# Patient Record
Sex: FEMALE | Race: BLACK OR AFRICAN AMERICAN | NOT HISPANIC OR LATINO | Employment: OTHER | URBAN - METROPOLITAN AREA
[De-identification: names, ages, dates, MRNs, and addresses within clinical notes are randomized per-mention and may not be internally consistent; named-entity substitution may affect disease eponyms.]

---

## 2017-03-22 ENCOUNTER — OFFICE VISIT - HEALTHEAST (OUTPATIENT)
Dept: ADDICTION MEDICINE | Facility: CLINIC | Age: 52
End: 2017-03-22

## 2017-03-22 DIAGNOSIS — F11.20 OPIOID USE DISORDER, SEVERE, DEPENDENCE (H): ICD-10-CM

## 2017-04-03 ENCOUNTER — COMMUNICATION - HEALTHEAST (OUTPATIENT)
Dept: ADDICTION MEDICINE | Facility: CLINIC | Age: 52
End: 2017-04-03

## 2017-04-18 ENCOUNTER — OFFICE VISIT (OUTPATIENT)
Dept: NEUROLOGY | Facility: CLINIC | Age: 52
End: 2017-04-18

## 2017-04-18 VITALS
DIASTOLIC BLOOD PRESSURE: 89 MMHG | BODY MASS INDEX: 45.75 KG/M2 | RESPIRATION RATE: 20 BRPM | WEIGHT: 233 LBS | HEIGHT: 60 IN | SYSTOLIC BLOOD PRESSURE: 149 MMHG | HEART RATE: 82 BPM | OXYGEN SATURATION: 99 %

## 2017-04-18 DIAGNOSIS — G40.209 PARTIAL SYMPTOMATIC EPILEPSY WITH COMPLEX PARTIAL SEIZURES, NOT INTRACTABLE, WITHOUT STATUS EPILEPTICUS (H): ICD-10-CM

## 2017-04-18 RX ORDER — OXCARBAZEPINE 300 MG/1
TABLET, FILM COATED ORAL
Qty: 150 TABLET | Refills: 11 | Status: SHIPPED | OUTPATIENT
Start: 2017-04-18 | End: 2019-08-26

## 2017-04-18 ASSESSMENT — PAIN SCALES - GENERAL: PAINLEVEL: NO PAIN (0)

## 2017-04-18 NOTE — MR AVS SNAPSHOT
After Visit Summary   4/18/2017    Roger Fallon    MRN: [id number]           Patient Information     Date Of Birth          1965        Visit Information        Provider Department      4/18/2017 11:00 AM Roney Mora MD [facility]        Today's Diagnoses     Partial symptomatic epilepsy with complex partial seizures, not intractable, without status epilepticus (H)           Follow-ups after your visit        Follow-up notes from your care team     Return in about 6 months (around 10/18/2017).      Your next 10 appointments already scheduled     Apr 18, 2017 11:30 AM CDT   LAB with  LAB   [facility] ([facility])    [address] [place] [id number] [phone]           Patient must bring picture ID.  Patient should be prepared to give a urine specimen  Please do not eat 10-12 hours before your appointment if you are coming in fasting for labs on lipids, cholesterol, or glucose (sugar).  Pregnant women should follow their Care Team instructions. Water with medications is okay. Do not drink coffee or other fluids.   If you have concerns about taking  your medications, please ask at office or if scheduling via [employer], send a message by clicking on Secure Messaging, Message Your Care Team.            Apr 27, 2017  4:00 PM CDT   (Arrive by 3:45 PM)   MR BRAIN W CONTRAST with [address] [facility] ([facility])    [address] [place] [address] [phone]           Take your medicines as usual, unless your doctor tells you not to. Bring a list of your current medicines to your exam (including vitamins, minerals and over-the-counter drugs).  You will be given intravenous contrast for this exam. To prepare:   The day before your exam, drink extra fluids at least six 8-ounce glasses (unless your doctor tells you to restrict your fluids).   Have a blood test  (creatinine test) within 30 days of your exam. Go to your clinic or Diagnostic Imaging Department for this test.  The MRI machine uses a strong magnet. Please wear clothes without metal (snaps, zippers). A sweatsuit works well, or we may give you a hospital gown.  Please remove any body piercings and hair extensions before you arrive. You will also remove watches, jewelry, hairpins, wallets, dentures, partial dental plates and hearing aids. You may wear contact lenses, and you may be able to wear your rings. We have a safe place to keep your personal items, but it is safer to leave them at home.   **IMPORTANT** THE INSTRUCTIONS BELOW ARE ONLY FOR THOSE PATIENTS WHO HAVE BEEN TOLD THEY WILL RECEIVE SEDATION OR GENERAL ANESTHESIA DURING THEIR MRI PROCEDURE:  IF YOU WILL RECEIVE SEDATION (take medicine to help you relax during your exam):   You must get the medicine from your doctor before you arrive. Bring the medicine to the exam. Do not take it at home.   Arrive one hour early. Bring someone who can take you home after the test. Your medicine will make you sleepy. After the exam, you may not drive, take a bus or take a taxi by yourself.   No eating 8 hours before your exam. You may have clear liquids up until 4 hours before your exam. (Clear liquids include water, clear tea, black coffee and fruit juice without pulp.)  IF YOU WILL RECEIVE ANESTHESIA (be asleep for your exam):   Arrive 1 1/2 hours early. Bring someone who can take you home after the test. You may not drive, take a bus or take a taxi by yourself.   No eating 8 hours before your exam. You may have clear liquids up until 4 hours before your exam. (Clear liquids include water, clear tea, black coffee and fruit juice without pulp.)  Please call the Imaging Department at your exam site with any questions.            Oct 19, 2017 12:00 PM CDT   (Arrive by 11:45 AM)   Return Visit with Roney Mora MD   [facility] ([facility]  [place])    [address] [address] [phone]              Future tests that were ordered for you today     Open Future Orders        Priority Expected Expires Ordered    Oxcarbazepine (Trileptal) level Routine  2018    MRI Brain w contrast Routine  2018            Who to contact     Please call your clinic at [phone] to:    Ask questions about your health    Make or cancel appointments    Discuss your medicines    Learn about your test results    Speak to your doctor   If you have compliments or concerns about an experience at your clinic, or if you wish to file a complaint, please contact [facility] Patient Relations at [phone] or email us at [email or web address]         Additional Information About Your Visit        [employer] Information     [employer] is an electronic gateway that provides easy, online access to your medical records. With [employer], you can request a clinic appointment, read your test results, renew a prescription or communicate with your care team.     To sign up for [employer] visit the website at [email or web address]   You will be asked to enter the access code listed below, as well as some personal information. Please follow the directions to create your username and password.     Your access code is: [email or web address]  Expires: 7/3/2017  6:30 AM     Your access code will  in 90 days. If you need help or a new code, please contact your [facility] Clinic or call [phone] for assistance.        Care EveryWhere ID     This is your Care EveryWhere ID. This could be used by other organizations to access your [facility] medical records  [id number]        Your Vitals Were     Pulse Respirations Height Pulse Oximetry Breastfeeding? BMI (Body Mass Index)    82 20 1.524 m (5') 99% No 45.5 kg/m2       Blood Pressure from Last 3 Encounters:   17 149/89    10/19/16 115/74   05/16/16 130/78    Weight from Last 3 Encounters:   04/18/17 105.7 kg (233 lb)   10/19/16 108.1 kg (238 lb 4.8 oz)   05/07/13 108 kg (238 lb)                 Where to get your medicines      These medications were sent to [id number] - [place], [place] - [address] AT [facility] [address]  [address], [place] [address]    Hours:  24-hours Phone:  [phone]     OXcarbazepine 300 MG tablet          Primary Care Provider Office Phone # Fax #    Eric Lange [phone]       [facility] [address] [facility] [address]        Thank you!     Thank you for choosing [facility]  for your care. Our goal is always to provide you with excellent care. Hearing back from our patients is one way we can continue to improve our services. Please take a few minutes to complete the written survey that you may receive in the mail after your visit with us. Thank you!             Your Updated Medication List - Protect others around you: Learn how to safely use, store and throw away your medicines at www.disposemymeds.org.          This list is accurate as of: 4/18/17 11:19 AM.  Always use your most recent med list.                   Brand Name Dispense Instructions for use    escitalopram 20 MG tablet    LEXAPRO     Take 20 mg by mouth daily.       METFORMIN HCL PO      Take 500 mg by mouth 2 times daily (with meals)       * morphine 100 MG 12 hr tablet    MS CONTIN     Take 100 mg by mouth 2 times daily.       * morphine 60 MG 12 hr tablet    MS CONTIN     Take 2 tablets by mouth At Bedtime.       OXcarbazepine 300 MG tablet    TRILEPTAL    150 tablet    2 tab in AM & 3 tabs in evening       PERCOCET PO      Take 30 mg by mouth daily. 3 tabs once a day for breakthrough pain.       * Notice:  This list has 2 medication(s) that are the same as other medications prescribed for you. Read the directions carefully, and ask your doctor or other  care provider to review them with you.

## 2017-04-19 LAB — 10OH-CARBAZEPINE SERPL-MCNC: 12 UG/ML

## 2017-04-24 NOTE — PROGRESS NOTES
2017             Eric Lange MD   [place] [id number]      RE: Roger Fallon   MRN: [id number]   : 1965      Dear Dr. Lange:      This woman is 51 and is said to have a history of seizures, but has not had one for 3 years or more.  She says her father used to have them, but she cannot describe.  She said her seizures started at age 30 and have been characterized by speech arrest and profound sweating.  I have seen her a couple of times; the last time was in October of last year, and we noticed her MRI had a left subfrontal lesion, and so she is due for a repeat one.  This was not an enhancing lesion, and it was on the T2 FLAIR signal.  Apparently, the grandfather also had seizures curiously when he laughed.  She has no history of head injury, meningitis or encephalitis as a possible etiology for her seizures, which seem to be partial.  Her neurological exam has been normal.  She returns today and reports no episodes in the past.  She is using OxyContin and morphine for chronic back pain.  She is disabled because of that.        PHYSICAL EXAMINATION:  Small pupils as expected.  Fundi look normal.  Normal extraocular movements.  Facial motion and facial sensation are normal.  Neck is supple.  Good coordination.  Her arms are strong, and reflexes are equal.  She has absent ankle.  She has diabetes.      In summary, she seems to be seizure free.  The seizures have not been well characterized.  Her last EEG was done here in 2016 in May, and actually it was ordered, but she never followed through.  She will need admission for monitoring and classification of her episodes, which are not clear.  The MRI has shown a frontal lesion in the left in .  We will repeat the MRI with contrast and see if that is static or not.  I suspect it probably will be.      Her Trileptal level has been very low using 600-900 at 9.5.  We stressed compliance.  We  might raise the dose, but we will recheck the concentration today.  We will work on admitting her in the near future.      Sincerely,      MD ULYSSES Wick MD             D: 2017 11:51   T: 2017 07:11   MT: joyce      Name:     BETTY BOWEN   MRN:      [id number]        Account:      [id number]   :      1965           Service Date: 2017      Document: [id number]

## 2017-08-17 ENCOUNTER — RECORDS - HEALTHEAST (OUTPATIENT)
Dept: ADMINISTRATIVE | Facility: OTHER | Age: 52
End: 2017-08-17

## 2017-09-08 ENCOUNTER — RECORDS - HEALTHEAST (OUTPATIENT)
Dept: ADMINISTRATIVE | Facility: OTHER | Age: 52
End: 2017-09-08

## 2017-10-23 ENCOUNTER — RECORDS - HEALTHEAST (OUTPATIENT)
Dept: ADMINISTRATIVE | Facility: OTHER | Age: 52
End: 2017-10-23

## 2017-11-20 ENCOUNTER — RECORDS - HEALTHEAST (OUTPATIENT)
Dept: ADMINISTRATIVE | Facility: OTHER | Age: 52
End: 2017-11-20

## 2017-12-20 ENCOUNTER — RECORDS - HEALTHEAST (OUTPATIENT)
Dept: ADMINISTRATIVE | Facility: OTHER | Age: 52
End: 2017-12-20

## 2018-01-10 ENCOUNTER — RECORDS - HEALTHEAST (OUTPATIENT)
Dept: ADMINISTRATIVE | Facility: OTHER | Age: 53
End: 2018-01-10

## 2018-01-30 ENCOUNTER — RECORDS - HEALTHEAST (OUTPATIENT)
Dept: ADMINISTRATIVE | Facility: OTHER | Age: 53
End: 2018-01-30

## 2018-02-02 ENCOUNTER — RECORDS - HEALTHEAST (OUTPATIENT)
Dept: ADMINISTRATIVE | Facility: OTHER | Age: 53
End: 2018-02-02

## 2018-05-09 ENCOUNTER — RECORDS - HEALTHEAST (OUTPATIENT)
Dept: ADMINISTRATIVE | Facility: OTHER | Age: 53
End: 2018-05-09

## 2018-06-04 ENCOUNTER — RECORDS - HEALTHEAST (OUTPATIENT)
Dept: ADMINISTRATIVE | Facility: OTHER | Age: 53
End: 2018-06-04

## 2018-07-12 ENCOUNTER — RECORDS - HEALTHEAST (OUTPATIENT)
Dept: ADMINISTRATIVE | Facility: OTHER | Age: 53
End: 2018-07-12

## 2018-07-20 ENCOUNTER — RECORDS - HEALTHEAST (OUTPATIENT)
Dept: ADMINISTRATIVE | Facility: OTHER | Age: 53
End: 2018-07-20

## 2018-08-03 ENCOUNTER — RECORDS - HEALTHEAST (OUTPATIENT)
Dept: ADMINISTRATIVE | Facility: OTHER | Age: 53
End: 2018-08-03

## 2018-09-05 ENCOUNTER — RECORDS - HEALTHEAST (OUTPATIENT)
Dept: ADMINISTRATIVE | Facility: OTHER | Age: 53
End: 2018-09-05

## 2018-10-05 ENCOUNTER — RECORDS - HEALTHEAST (OUTPATIENT)
Dept: ADMINISTRATIVE | Facility: OTHER | Age: 53
End: 2018-10-05

## 2018-11-09 ENCOUNTER — RECORDS - HEALTHEAST (OUTPATIENT)
Dept: ADMINISTRATIVE | Facility: OTHER | Age: 53
End: 2018-11-09

## 2018-12-03 ENCOUNTER — RECORDS - HEALTHEAST (OUTPATIENT)
Dept: ADMINISTRATIVE | Facility: OTHER | Age: 53
End: 2018-12-03

## 2019-01-14 ENCOUNTER — RECORDS - HEALTHEAST (OUTPATIENT)
Dept: ADMINISTRATIVE | Facility: OTHER | Age: 54
End: 2019-01-14

## 2019-02-04 ENCOUNTER — RECORDS - HEALTHEAST (OUTPATIENT)
Dept: ADMINISTRATIVE | Facility: OTHER | Age: 54
End: 2019-02-04

## 2019-03-14 ENCOUNTER — RECORDS - HEALTHEAST (OUTPATIENT)
Dept: ADMINISTRATIVE | Facility: OTHER | Age: 54
End: 2019-03-14

## 2019-04-01 ENCOUNTER — RECORDS - HEALTHEAST (OUTPATIENT)
Dept: ADMINISTRATIVE | Facility: OTHER | Age: 54
End: 2019-04-01

## 2019-05-02 ENCOUNTER — RECORDS - HEALTHEAST (OUTPATIENT)
Dept: ADMINISTRATIVE | Facility: OTHER | Age: 54
End: 2019-05-02

## 2019-05-08 ENCOUNTER — RX ONLY (RX ONLY)
Age: 54
End: 2019-05-08

## 2019-05-08 RX ORDER — CLOBETASOL PROPIONATE 0.5 MG/G
A THIN LAYER OINTMENT TOPICAL BID
Qty: 1 | Refills: 0 | Status: ERX | COMMUNITY
Start: 2019-05-08

## 2019-05-14 ENCOUNTER — RX ONLY (RX ONLY)
Age: 54
End: 2019-05-14

## 2019-05-14 RX ORDER — BETAMETHASONE DIPROPIONATE 0.5 MG/G
A THIN LAYER CREAM TOPICAL BID
Qty: 1 | Refills: 0 | Status: ERX | COMMUNITY
Start: 2019-05-14

## 2019-06-14 ENCOUNTER — RECORDS - HEALTHEAST (OUTPATIENT)
Dept: ADMINISTRATIVE | Facility: OTHER | Age: 54
End: 2019-06-14

## 2019-06-17 ENCOUNTER — AMBULATORY - HEALTHEAST (OUTPATIENT)
Dept: FAMILY MEDICINE | Facility: CLINIC | Age: 54
End: 2019-06-17

## 2019-06-17 ENCOUNTER — OFFICE VISIT - HEALTHEAST (OUTPATIENT)
Dept: FAMILY MEDICINE | Facility: CLINIC | Age: 54
End: 2019-06-17

## 2019-06-17 DIAGNOSIS — F19.10 SUBSTANCE ABUSE (H): ICD-10-CM

## 2019-06-17 DIAGNOSIS — G89.29 OTHER CHRONIC PAIN: ICD-10-CM

## 2019-06-17 ASSESSMENT — MIFFLIN-ST. JEOR: SCORE: 1572.92

## 2019-07-07 ENCOUNTER — COMMUNICATION - HEALTHEAST (OUTPATIENT)
Dept: FAMILY MEDICINE | Facility: CLINIC | Age: 54
End: 2019-07-07

## 2019-07-16 ENCOUNTER — DOCUMENTATION ONLY (OUTPATIENT)
Dept: CARE COORDINATION | Facility: CLINIC | Age: 54
End: 2019-07-16

## 2019-07-31 ENCOUNTER — COMMUNICATION - HEALTHEAST (OUTPATIENT)
Dept: FAMILY MEDICINE | Facility: CLINIC | Age: 54
End: 2019-07-31

## 2019-07-31 ENCOUNTER — OFFICE VISIT - HEALTHEAST (OUTPATIENT)
Dept: FAMILY MEDICINE | Facility: CLINIC | Age: 54
End: 2019-07-31

## 2019-07-31 DIAGNOSIS — J45.20 MILD INTERMITTENT REACTIVE AIRWAY DISEASE WITHOUT COMPLICATION: ICD-10-CM

## 2019-07-31 DIAGNOSIS — Z12.11 SCREEN FOR COLON CANCER: ICD-10-CM

## 2019-07-31 DIAGNOSIS — F32.9 MAJOR DEPRESSIVE DISORDER WITH SINGLE EPISODE, REMISSION STATUS UNSPECIFIED: ICD-10-CM

## 2019-07-31 DIAGNOSIS — M51.369 DEGENERATION OF INTERVERTEBRAL DISC OF LUMBAR REGION: ICD-10-CM

## 2019-07-31 DIAGNOSIS — B37.2 CANDIDAL INTERTRIGO: ICD-10-CM

## 2019-08-26 ENCOUNTER — OFFICE VISIT (OUTPATIENT)
Dept: NEUROLOGY | Facility: CLINIC | Age: 54
End: 2019-08-26
Payer: MEDICARE

## 2019-08-26 VITALS
RESPIRATION RATE: 16 BRPM | HEIGHT: 60 IN | SYSTOLIC BLOOD PRESSURE: 141 MMHG | HEART RATE: 96 BPM | WEIGHT: 235 LBS | BODY MASS INDEX: 46.13 KG/M2 | DIASTOLIC BLOOD PRESSURE: 80 MMHG | OXYGEN SATURATION: 97 %

## 2019-08-26 DIAGNOSIS — G40.209 PARTIAL SYMPTOMATIC EPILEPSY WITH COMPLEX PARTIAL SEIZURES, NOT INTRACTABLE, WITHOUT STATUS EPILEPTICUS (H): Primary | ICD-10-CM

## 2019-08-26 DIAGNOSIS — G40.209 PARTIAL SYMPTOMATIC EPILEPSY WITH COMPLEX PARTIAL SEIZURES, NOT INTRACTABLE, WITHOUT STATUS EPILEPTICUS (H): ICD-10-CM

## 2019-08-26 RX ORDER — OXCARBAZEPINE 300 MG/1
TABLET, FILM COATED ORAL
Qty: 150 TABLET | Refills: 11 | Status: SHIPPED | OUTPATIENT
Start: 2019-08-26 | End: 2019-08-26

## 2019-08-26 ASSESSMENT — PAIN SCALES - GENERAL: PAINLEVEL: NO PAIN (0)

## 2019-08-26 ASSESSMENT — MIFFLIN-ST. JEOR: SCORE: 1587.45

## 2019-08-26 NOTE — LETTER
RE: Roger Fallon  [address] [place] [address]       Service Date: 2019      Eric Lange MD   [address] [address]      RE: Roger Fallon   MRN: [id number]   : 1965      Dear Dr. Lange:      We saw Ms. Fallon, a 54-year-old  with a history of seizures.  I had last seen her in 2017.  The etiology is not clear.  She reported that her seizures had started at the age of 30, and they are characterized by speech arrest and sweating.  Her grandfather had seizures later in life, and they were similar.  MRI of a left subfrontal lesion was noted, but in retrospect, I do not see it.      She has not had any seizures since seen in 2017.  She has been kept on Trileptal with good results at 600-900.  Seizures have not been very well characterized in the past.  An admission for monitoring was suggested previously, but she is not interested in it.  The patient says that since her last visit, she has had no further seizures or auras.      She had her last MRI in , and she had had a pituitary adenoma found at the age of 19.  It ended up she had had an MRI before on the pituitary gland.  She currently does not have any pituitary signs or symptoms.  She does have an intensive feeling in the sella turcica where the septations suggest a cyst, and there is thin homogenously enhancing pituitary lesion on the floor of the sella turcica resulting in a partially empty sella appearance.  No mass is demonstrated within the sella.  We will reexamine this.      PHYSICAL EXAMINATION:  Her vital signs are good, except blood pressure is 140/80.  Weight is 235.  She has no visual field defect to confrontation.  Eye movements are good.  There is no facial asymmetry.  Coordination is good.  Reflexes are equal and symmetrical.  Gait, station and plantars are normal.      In summary, she continues seizure free.  The etiology of her epilepsy  eludes us.  She has not had a full workup.  Her grandfather did have seizures later in life.  There is a question of a pituitary lesion, and I will redo the MRI with pituitary protocol.  We will see her and check her concentration and renew her Trileptal.      Sincerely,      Roney Mora MD

## 2019-08-26 NOTE — NURSING NOTE
Chief Complaint   Patient presents with     INTRACTABLE MIGRAINES     [facility] RETURN     Lita Ventura, CMA

## 2019-08-27 LAB — 10OH-CARBAZEPINE SERPL-MCNC: 9.4 UG/ML (ref 10–35)

## 2019-08-27 NOTE — PROGRESS NOTES
Service Date: 2019      [place] [address]      RE: Roger Fallon   MRN: [id number]   : 1965      Dear Dr. Lange:      We saw Ms. Fallon, a 54-year-old  with a history of seizures.  I had last seen her in 2017.  The etiology is not clear.  She reported that her seizures had started at the age of 30, and they are characterized by speech arrest and sweating.  Her grandfather had seizures later in life, and they were similar.  MRI of a left subfrontal lesion was noted, but in retrospect, I do not see it.      She has not had any seizures since seen in 2017.  She has been kept on Trileptal with good results at 600-900.  Seizures have not been very well characterized in the past.  An admission for monitoring was suggested previously, but she is not interested in it.  The patient says that since her last visit, she has had no further seizures or auras.      She had her last MRI in , and she had had a pituitary adenoma found at the age of 19.  It ended up she had had an MRI before on the pituitary gland.  She currently does not have any pituitary signs or symptoms.  She does have an intensive feeling in the sella turcica where the septations suggest a cyst, and there is thin homogenously enhancing pituitary lesion on the floor of the sella turcica resulting in a partially empty sella appearance.  No mass is demonstrated within the sella.  We will reexamine this.      PHYSICAL EXAMINATION:  Her vital signs are good, except blood pressure is 140/80.  Weight is 235.  She has no visual field defect to confrontation.  Eye movements are good.  There is no facial asymmetry.  Coordination is good.  Reflexes are equal and symmetrical.  Gait, station and plantars are normal.      In summary, she continues seizure free.  The etiology of her epilepsy eludes us.  She has not had a full workup.  Her grandfather did have  seizures later in life.  There is a question of a pituitary lesion, and I will redo the MRI with pituitary protocol.  We will see her and check her concentration and renew her Trileptal.      Sincerely,      MD ULYSSES Wick MD             D: 2019   T: 2019   MT: joyce      Name:     BETTY BOWEN   MRN:      [id number]        Account:      [id number]   :      1965           Service Date: 2019      Document: [id number]

## 2019-08-28 RX ORDER — OXCARBAZEPINE 300 MG/1
TABLET, FILM COATED ORAL
Qty: 450 TABLET | Refills: 11 | Status: SHIPPED | OUTPATIENT
Start: 2019-08-28 | End: 2020-08-24

## 2019-09-29 ENCOUNTER — ANCILLARY PROCEDURE (OUTPATIENT)
Dept: MRI IMAGING | Facility: CLINIC | Age: 54
End: 2019-09-29
Attending: PSYCHIATRY & NEUROLOGY
Payer: MEDICARE

## 2019-09-29 DIAGNOSIS — G40.209 PARTIAL SYMPTOMATIC EPILEPSY WITH COMPLEX PARTIAL SEIZURES, NOT INTRACTABLE, WITHOUT STATUS EPILEPTICUS (H): ICD-10-CM

## 2019-09-29 RX ORDER — GADOBUTROL 604.72 MG/ML
10 INJECTION INTRAVENOUS ONCE
Status: COMPLETED | OUTPATIENT
Start: 2019-09-29 | End: 2019-09-29

## 2019-09-29 RX ADMIN — GADOBUTROL 10 ML: 604.72 INJECTION INTRAVENOUS at 11:50

## 2019-09-29 NOTE — DISCHARGE INSTRUCTIONS
MRI Contrast Discharge Instructions    The IV contrast you received today will pass out of your body in your  urine. This will happen in the next 24 hours. You will not feel this process.  Your urine will not change color.    Drink at least 4 extra glasses of water or juice today (unless your doctor  has restricted your fluids). This reduces the stress on your kidneys.  You may take your regular medicines.    If you are on dialysis: It is best to have dialysis today.    If you have a reaction: Most reactions happen right away. If you have  any new symptoms after leaving the hospital (such as hives or swelling),  call your hospital at the correct number below. Or call your family doctor.  If you have breathing distress or wheezing, call 911.    Special instructions: ***    I have read and understand the above information.    Signature:______________________________________ Date:___________    Staff:__________________________________________ Date:___________     Time:__________    [facility] Departments:    ___[place]: [phone]  ___[facility]: [phone]  ___[place]: [phone] ___[facility]: [phone]  ___[facility]: [phone]  ___[facility]: [phone]  ___[phone]  ___[facility]: [phone]  ___[phone]

## 2020-06-25 ENCOUNTER — RECORDS - HEALTHEAST (OUTPATIENT)
Dept: ADMINISTRATIVE | Facility: OTHER | Age: 55
End: 2020-06-25

## 2020-06-25 ENCOUNTER — AMBULATORY - HEALTHEAST (OUTPATIENT)
Dept: SURGERY | Facility: CLINIC | Age: 55
End: 2020-06-25

## 2020-06-25 DIAGNOSIS — Z11.59 ENCOUNTER FOR SCREENING FOR OTHER VIRAL DISEASES: ICD-10-CM

## 2020-08-19 ENCOUNTER — RECORDS - HEALTHEAST (OUTPATIENT)
Dept: ADMINISTRATIVE | Facility: OTHER | Age: 55
End: 2020-08-19

## 2020-08-24 ENCOUNTER — VIRTUAL VISIT (OUTPATIENT)
Dept: NEUROLOGY | Facility: CLINIC | Age: 55
End: 2020-08-24
Payer: MEDICARE

## 2020-08-24 ENCOUNTER — TELEPHONE (OUTPATIENT)
Dept: NEUROLOGY | Facility: CLINIC | Age: 55
End: 2020-08-24

## 2020-08-24 DIAGNOSIS — G40.209 PARTIAL SYMPTOMATIC EPILEPSY WITH COMPLEX PARTIAL SEIZURES, NOT INTRACTABLE, WITHOUT STATUS EPILEPTICUS (H): Primary | ICD-10-CM

## 2020-08-24 DIAGNOSIS — G40.209 PARTIAL SYMPTOMATIC EPILEPSY WITH COMPLEX PARTIAL SEIZURES, NOT INTRACTABLE, WITHOUT STATUS EPILEPTICUS (H): ICD-10-CM

## 2020-08-24 RX ORDER — OXCARBAZEPINE 300 MG/1
TABLET, FILM COATED ORAL
Qty: 450 TABLET | Refills: 11 | Status: SHIPPED | OUTPATIENT
Start: 2020-08-24 | End: 2023-02-06

## 2020-08-24 NOTE — PROGRESS NOTES
"Roger Fallon is a 55 year old female who is being evaluated via a billable video visit.      The patient has been notified of following:     \"This video visit will be conducted via a call between you and your physician/provider. We have found that certain health care needs can be provided without the need for an in-person physical exam.  This service lets us provide the care you need with a video conversation.  If a prescription is necessary we can send it directly to your pharmacy.  If lab work is needed we can place an order for that and you can then stop by our lab to have the test done at a later time.    Video visits are billed at different rates depending on your insurance coverage.  Please reach out to your insurance provider with any questions.    If during the course of the call the physician/provider feels a video visit is not appropriate, you will not be charged for this service.\"    Patient has given verbal consent for Video visit? Yes  How would you like to obtain your AVS? Mail a copy  Patient would like sent via: Text to cell phone: [phone]  Will anyone else be joining your video visit? No      Video-Visit Details    Type of service:  Video Visit x 30 min      Distant Location (provider location):  [facility]UROLOG[facility]       [facility]        "

## 2020-08-24 NOTE — PROGRESS NOTES
Service Date: 2020      Eric Lange MD   [address] [address]      RE: Roger Fallon    MRN: [id number]   : 1965      Dear Dr. Lange:       Ms. Roger Fallon is a 55 year old whom I have treated for a suspected seizure disorder, and she came in today for followup.  It was a virtual video visit over a period of about 30 minutes.  She is a most pleasant patient.  I reviewed her history with her.  I have not seen her for a seizure or other neurological problem for over 3 years since 2017.  We gave her a tentative diagnosis of partial symptomatic epilepsy.  She had not had any seizures at that time for 3 or more years.  She was not able to describe her seizure, but she said this started at the age of 30, and they were characterized by speech arrest and profound sweating.  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  She has no history of meningitis, encephalitis or head injury in her family.  I believe there is a family history in her father having seizures.  My neuro exam in the past when I could see her, there were no deficits.  The last EEG was ordered in 2016, but never accomplished.  The episodes were not clear seizures, but we kept her on the Trileptal that she has been on since that time, and this is oxcarbazepine in 2 tablets in the morning or 600 mg and 3 tablets in the evening or 900 mg.  I do not have a current concentration of carbamazepine, but there was one 6 months ago, and that was low at 9.4.      On the one prior to that in  when I did see her, it was similar at 12.      She did not follow up an EEG ordered in 2016.  There has been a lot of uncertainty about a seizure disorder, but we kept her on the medication.  Today she reports she has not had any seizures since I last saw her in 2017.      PHYSICAL EXAMINATION:  Her blood pressure is 141/80.  She looks very  well.  A grandchild is with her; she is babysitting.  Her mental status exam is normal.  Cranial nerves look grossly normal.  Coordination is very good in both arms and legs.  She reports no difficulties.      In summary, this patient is going to have surgery for the knee on , and we will be assuming she has a seizure disorder, although we do not have a clear precise diagnosis, and admission has not been possible.  We will do a oxcarbazepine level to see if it is near therapeutic, but they have been low in the past.  We will certify her for surgery.  We told her to take her Trileptal with small sips of water the night before the operation.  She has no history of cerebrovascular disease or a problem of that nature.      Her past medical history does not include any cardiac disease.  She will be having a total knee arthroplasty, as she says she cannot walk because of the pain.      We shall see what her blood level is.  It is hopefully increased to prepare her for surgery on .      Sincerely,      MD ULYSSES Wick MD             D: 2020   T: 2020   MT: joyce      Name:     BETTY BOWEN   MRN:      [id number]        Account:      [id number]   :      1965           Service Date: 2020      Document: [id number]

## 2020-08-24 NOTE — LETTER
"2020     RE: Roger Fallon  [address] [place] [address]     Dear Colleague,    Thank you for referring your patient, Roger Fallon, to the [facility] NEUROLOGY at [facility]. Please see a copy of my visit note below.    Roger Fallon is a 55 year old female who is being evaluated via a billable video visit.      The patient has been notified of following:     \"This video visit will be conducted via a call between you and your physician/provider. We have found that certain health care needs can be provided without the need for an in-person physical exam.  This service lets us provide the care you need with a video conversation.  If a prescription is necessary we can send it directly to your pharmacy.  If lab work is needed we can place an order for that and you can then stop by our lab to have the test done at a later time.    Video visits are billed at different rates depending on your insurance coverage.  Please reach out to your insurance provider with any questions.    If during the course of the call the physician/provider feels a video visit is not appropriate, you will not be charged for this service.\"    Patient has given verbal consent for Video visit? Yes  How would you like to obtain your AVS? Mail a copy  Patient would like sent via: Text to cell phone: [phone]  Will anyone else be joining your video visit? No    Video-Visit Details  Type of service:  Video Visit x 30 min  Distant Location (provider location):  [facility] NEUROLOGY   fi      Service Date: 2020        Eric Lange MD   [address] [address]      RE: Roger Fallon    MRN: [id number]   : 1965      Dear Dr. Lange:       Ms. Roger Fallon is a 55 year old whom I have treated for a suspected seizure disorder, and she came in today for followup.  It was a virtual video visit over a period of about 30 " minutes.  She is a most pleasant patient.  I reviewed her history with her.  I have not seen her for a seizure or other neurological problem for over 3 years since 03/2017.  We gave her a tentative diagnosis of partial symptomatic epilepsy.  She had not had any seizures at that time for 3 or more years.  She was not able to describe her seizure, but she said this started at the age of 30, and they were characterized by speech arrest and profound sweating.  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  She has no history of meningitis, encephalitis or head injury in her family.  I believe there is a family history in her father having seizures.  My neuro exam in the past when I could see her, there were no deficits.  The last EEG was ordered in 2016, but never accomplished.  The episodes were not clear seizures, but we kept her on the Trileptal that she has been on since that time, and this is oxcarbazepine in 2 tablets in the morning or 600 mg and 3 tablets in the evening or 900 mg.  I do not have a current concentration of carbamazepine, but there was one 6 months ago, and that was low at 9.4.      On the one prior to that in 2017 when I did see her, it was similar at 12.      She did not follow up an EEG ordered in 2016.  There has been a lot of uncertainty about a seizure disorder, but we kept her on the medication.  Today she reports she has not had any seizures since I last saw her in 2017.      PHYSICAL EXAMINATION:  Her blood pressure is 141/80.  She looks very well.  A grandchild is with her; she is babysitting.  Her mental status exam is normal.  Cranial nerves look grossly normal.  Coordination is very good in both arms and legs.  She reports no difficulties.      In summary, this patient is going to have surgery for the knee on 09/08, and we will be assuming she has a seizure disorder, although we do not have a clear precise diagnosis, and  admission has not been possible.  We will do a oxcarbazepine level to see if it is near therapeutic, but they have been low in the past.  We will certify her for surgery.  We told her to take her Trileptal with small sips of water the night before the operation.  She has no history of cerebrovascular disease or a problem of that nature.      Her past medical history does not include any cardiac disease.  She will be having a total knee arthroplasty, as she says she cannot walk because of the pain.      We shall see what her blood level is.  It is hopefully increased to prepare her for surgery on 09/08.      Sincerely,      Roney Mora MD

## 2020-08-27 ENCOUNTER — RECORDS - HEALTHEAST (OUTPATIENT)
Dept: ADMINISTRATIVE | Facility: OTHER | Age: 55
End: 2020-08-27

## 2020-08-27 ASSESSMENT — MIFFLIN-ST. JEOR: SCORE: 1618.28

## 2020-09-08 ENCOUNTER — SURGERY - HEALTHEAST (OUTPATIENT)
Dept: SURGERY | Facility: CLINIC | Age: 55
End: 2020-09-08
Payer: MEDICARE

## 2021-05-28 ENCOUNTER — RECORDS - HEALTHEAST (OUTPATIENT)
Dept: ADMINISTRATIVE | Facility: CLINIC | Age: 56
End: 2021-05-28

## 2021-05-29 ENCOUNTER — RECORDS - HEALTHEAST (OUTPATIENT)
Dept: ADMINISTRATIVE | Facility: CLINIC | Age: 56
End: 2021-05-29

## 2021-05-29 NOTE — PROGRESS NOTES
Subjective:      Roger Fallon is a 54 y.o. female who presents for evaluation of tablets care and chronic pain.  She had been seeing Dr. Lange at [facility].  Dr. Lange no longer works there.  She says she was told to come here to continue her methadone prescription.  Narcotics report was run on patient today.  It appears she is getting methadone prescriptions about every 2 weeks from Dr. Lange since July 2018.  She says she has been out of all of her medicines for 3 weeks.  Her pain is significantly bothersome.  Pain is mostly in the bilateral knees and her back.  History of right knee surgery x2.    She thinks she is probably due for a Pap smear.  She is willing to get a colonoscopy.  Has had a mammogram within the past year or 2.  Her mom passed away from breast cancer.  She states she is taking gabapentin for seizure disorder.  She is following with an epileptic specialist.  I requested through care everywhere.  I reviewed old records and I will update EHR.  I also requested paper records (unavailable through [facility] Everywhere) from [facility].  I will review those records and update chart when they arrive.  She reports past history of substance abuse, does not elaborate today.    Patient Active Problem List   Diagnosis     Chronic pain     Complex partial epilepsy (H)     Degeneration of intervertebral disc of lumbar region     Hypercholesterolemia     Insomnia     Major depressive disorder, single episode     Migraine     Pituitary adenoma (H)     Reactive airway disease       Current Outpatient Medications:      albuterol (PROAIR HFA;PROVENTIL HFA;VENTOLIN HFA) 90 mcg/actuation inhaler, Inhale 2 puffs every 4 (four) hours as needed for wheezing., Disp: 1 Inhaler, Rfl: 0     benzonatate (TESSALON) 100 MG capsule, Take 1 capsule (100 mg total) by mouth every 8 (eight) hours., Disp: 21 capsule, Rfl: 0     gabapentin (NEURONTIN) 300 MG capsule, Take 1 capsule (300 mg total) by mouth 3 (three)  times a day., Disp: 15 capsule, Rfl: 0     methadone (DOLOPHINE) 10 MG tablet, Take 1 tablet by mouth 4 times a day with food, Disp: , Rfl:      MOVANTIK 25 mg Tab tablet, TK 1 T PO 1 HOUR BEFORE OR 2 H AFTER A MEAL, Disp: , Rfl: 2     NARCAN 4 mg/actuation nasal spray, INHALE 0.1 ML SPRAY INTO ONE NOSTRIL PRF OPIOID OVERDOSE, Disp: , Rfl: 0     OXcarbazepine (TRILEPTAL) 300 MG tablet, , Disp: , Rfl: 2     polyethylene glycol (GLYCOLAX) 17 gram/dose powder, DISSOLVE 17 GRAMS IN 8 OUNCES OF LIQUID AND DRINK PO QD, Disp: , Rfl: 2     PREPARATION H 0.25-14-74.9 % Oint, APPLY TOPICALLY AA QID PRN, Disp: , Rfl: 0     triamcinolone (KENALOG) 0.1 % cream, APPLY 1 APPLICATION TO RASH ON LEG TWICE-THREE TIMES D, Disp: , Rfl: 2     Objective:     Allergies   Allergen Reactions     Sumatriptan Anaphylaxis and Hives     Cucumber Swelling     Tongue       Penicillins Hives     Pumpkin Seed Swelling     Tongue swells     Vitals:    06/17/19 1114   BP: 136/72   Pulse: 88   Resp: 16     Body mass index is 45.7 kg/m .    Vitals reviewed as above.  General: Patient is alert and oriented x 3, in no apparent distress, appropriately groomed with normal affect  Cardiac: Regular rate and rhythm, no murmurs  Pulmonary: lungs clear to ausculation, no crackles, rales, rhonchi, or wheezing  Musculoskeletal: normal 4/5 strength in major muscle groups in lower extremities bilaterally, patient walks in a stiff gait, limping slightly, has a cane       Assessment and Plan:     1.  Establish care.  Records will be updated, as stated in HPI.  She wants to get up-to-date on all of her healthcare maintenance.  We will contact her for follow-up appointments and referrals.    2.  Chronic pain with history of substance abuse.  She had been prescribed methadone every 2 weeks by previous provider at [facility].  I reviewed with her that I, and all of the providers at our clinic, are unable to prescribe methadone.  She would need to see a provider at a  "methadone clinic.  We are unable to give referrals to those clinics.  I reviewed information on the Internet and provided her with local methadone clinics.  She was given phone numbers and addresses.  She will contact them to establish care.  I reviewed with her we do have some providers at other locations that prescribe Suboxone.  She says \"Suboxone does not work for me,\" and is not interested in that referral.    This dictation uses voice recognition software, which may contain typographical errors.  "

## 2021-05-30 NOTE — TELEPHONE ENCOUNTER
Does she have a neurologist she is seeing regularly for her seizure disorder?  If not, or if she hasn't seen a neurologist in the past year, she should.  I can put in a referral if needed.

## 2021-05-30 NOTE — TELEPHONE ENCOUNTER
DIEGO Spoke with patient - she is seeing Dr. Roney Keating at [place]. She stated she will make a follow up appointment.

## 2021-05-31 ENCOUNTER — RECORDS - HEALTHEAST (OUTPATIENT)
Dept: ADMINISTRATIVE | Facility: CLINIC | Age: 56
End: 2021-05-31

## 2021-05-31 NOTE — PROGRESS NOTES
Subjective:      Roger Fallon is a 54 y.o. female who presents for evaluation of rash.  She has had a rash under both breasts for a month.  She used some type of over-the-counter cream and rash seems to have improved a little bit.  She says initially it was red and weepy.  She notes that she does sweat a lot.  She is trying to keep the areas dry.  Left side is worse than the right side.  She also mentions ongoing problems with her chronic back pain.  She is wondering if I can prescribe her an egg carton mattress.  I reviewed with her that I would need her to follow-up with back specialist.  PHQ9 = 1    Patient Active Problem List   Diagnosis     Chronic pain     Complex partial epilepsy (H)     Degeneration of intervertebral disc of lumbar region     Hypercholesterolemia     Insomnia     Major depressive disorder, single episode     Migraine     Pituitary adenoma (H)     Reactive airway disease     Substance abuse (H)     Osteoarthritis of both knees     Methadone use (H)       Current Outpatient Medications:      albuterol (PROAIR HFA;PROVENTIL HFA;VENTOLIN HFA) 90 mcg/actuation inhaler, Inhale 2 puffs every 4 (four) hours as needed for wheezing., Disp: 1 Inhaler, Rfl: 0     benzonatate (TESSALON) 100 MG capsule, Take 1 capsule (100 mg total) by mouth every 8 (eight) hours., Disp: 21 capsule, Rfl: 0     gabapentin (NEURONTIN) 300 MG capsule, Take 1 capsule (300 mg total) by mouth 3 (three) times a day., Disp: 15 capsule, Rfl: 0     methadone (DOLOPHINE) 10 MG tablet, Take 1 tablet by mouth 4 times a day with food, Disp: , Rfl:      MOVANTIK 25 mg Tab tablet, TK 1 T PO 1 HOUR BEFORE OR 2 H AFTER A MEAL, Disp: , Rfl: 2     NARCAN 4 mg/actuation nasal spray, INHALE 0.1 ML SPRAY INTO ONE NOSTRIL PRF OPIOID OVERDOSE, Disp: , Rfl: 0     OXcarbazepine (TRILEPTAL) 300 MG tablet, , Disp: , Rfl: 2     polyethylene glycol (GLYCOLAX) 17 gram/dose powder, DISSOLVE 17 GRAMS IN 8 OUNCES OF LIQUID AND DRINK PO QD, Disp: ,  Rfl: 2     PREPARATION H 0.25-14-74.9 % Oint, APPLY TOPICALLY AA QID PRN, Disp: , Rfl: 0     triamcinolone (KENALOG) 0.1 % cream, APPLY 1 APPLICATION TO RASH ON LEG TWICE-THREE TIMES D, Disp: , Rfl: 2     nystatin (MYCOSTATIN) cream, Apply to affected areas 1-2 times a day, as needed for rash., Disp: 30 g, Rfl: 0     Objective:     Allergies:  Sumatriptan; Cucumber; Penicillins; and Pumpkin seed    Vitals:  Vitals:    07/31/19 1333   BP: 124/78   Pulse: 98   Resp: 16     Body mass index is 44.63 kg/m .    Vital signs reviewed.  General: Patient is alert and oriented x 3, in no apparent distress  Cardiac: regular rate and rhythm, no murmurs  Pulmonary: lungs clear to auscultation bilaterally  Skin: Moderately hyperpigmented areas underneath both breasts, left side larger than right, no significant erythema, no flakiness, no abrasions      Assessment and Plan:   1.  Skin dermatitis, suspect candidal intertrigo.  Patient notes rash seems to be improving.  Prescription sent for nystatin cream.  She preferred cream over powder.  If she wanted to hold off on using cream for now, since her rash is improving, that is fine.  Otherwise use it once or twice a day as needed.  If she is using it and it does not seem to be improving the rash after a week or so, she will contact us.    2.  Chronic low back pain.  Referral placed to [facility] spine care for follow-up.    3. Healthcare maintenance.  Referral placed for colonoscopy.  She had a mammogram done October 2018 at [facility].  ABRAM again signed and again sent to [facility] for this record.  She declined Pap smear today, but will schedule an appointment soon.    This dictation uses voice recognition software, which may contain typographical errors.

## 2021-06-03 VITALS — HEIGHT: 60 IN | BODY MASS INDEX: 45.94 KG/M2 | WEIGHT: 234 LBS

## 2021-06-03 VITALS — WEIGHT: 228.5 LBS | BODY MASS INDEX: 44.63 KG/M2

## 2021-06-04 VITALS — BODY MASS INDEX: 47.65 KG/M2 | BODY MASS INDEX: 47.65 KG/M2 | WEIGHT: 244 LBS | WEIGHT: 244 LBS

## 2021-06-04 VITALS — BODY MASS INDEX: 45.9 KG/M2 | BODY MASS INDEX: 45.9 KG/M2 | HEIGHT: 60 IN | HEIGHT: 60 IN

## 2021-06-08 ENCOUNTER — APPOINTMENT (OUTPATIENT)
Dept: URBAN - METROPOLITAN AREA CLINIC 260 | Age: 56
Setting detail: DERMATOLOGY
End: 2021-06-09

## 2021-06-08 VITALS — HEIGHT: 60 IN | RESPIRATION RATE: 16 BRPM | WEIGHT: 293 LBS

## 2021-06-08 DIAGNOSIS — L20.89 OTHER ATOPIC DERMATITIS: ICD-10-CM

## 2021-06-08 PROBLEM — L20.84 INTRINSIC (ALLERGIC) ECZEMA: Status: ACTIVE | Noted: 2021-06-08

## 2021-06-08 PROCEDURE — OTHER PRESCRIPTION MEDICATION MANAGEMENT: OTHER

## 2021-06-08 PROCEDURE — 99203 OFFICE O/P NEW LOW 30 MIN: CPT

## 2021-06-08 PROCEDURE — OTHER PRESCRIPTION: OTHER

## 2021-06-08 PROCEDURE — OTHER COUNSELING: OTHER

## 2021-06-08 RX ORDER — CLOBETASOL PROPIONATE 0.5 MG/G
0.05% OINTMENT TOPICAL BID
Qty: 1 | Refills: 2 | Status: ERX | COMMUNITY
Start: 2021-06-08

## 2021-06-08 ASSESSMENT — LOCATION ZONE DERM
LOCATION ZONE: NECK
LOCATION ZONE: ARM
LOCATION ZONE: LEG

## 2021-06-08 ASSESSMENT — LOCATION SIMPLE DESCRIPTION DERM
LOCATION SIMPLE: RIGHT POPLITEAL SKIN
LOCATION SIMPLE: LEFT POPLITEAL SKIN
LOCATION SIMPLE: LEFT FOREARM
LOCATION SIMPLE: LEFT ANTERIOR NECK
LOCATION SIMPLE: RIGHT FOREARM

## 2021-06-08 ASSESSMENT — LOCATION DETAILED DESCRIPTION DERM
LOCATION DETAILED: RIGHT VENTRAL PROXIMAL FOREARM
LOCATION DETAILED: LEFT VENTRAL PROXIMAL FOREARM
LOCATION DETAILED: LEFT POPLITEAL SKIN
LOCATION DETAILED: RIGHT POPLITEAL SKIN
LOCATION DETAILED: LEFT INFERIOR LATERAL NECK

## 2021-06-08 NOTE — PROCEDURE: PRESCRIPTION MEDICATION MANAGEMENT
Plan: Discussed if not improved in two weeks patient may return to clinic
Detail Level: Detailed
Initiate Treatment: Clobetasol 0.05% BID
Render In Strict Bullet Format?: No

## 2021-06-09 NOTE — PROGRESS NOTES
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 for her looks like the following:    Wake up, shower, dress, drop her youngest son off for the day, come home, deal with the baby until it is time for her dad to go to work, granddaughter gets picked up, then cooking, cleaning, staying busy around the house, watch TV or movies, and then bed.     Client reports that her schedule is the same when she uses, except she uses heroin instead of taking her medications,    2B. How often do you spend more time than you planned using or use more than you planned? (DSM)     Client reports that this has happened each time that she has used recently, but prior to the past couple of weeks it did not happen often.     3. How important is using to your social connections? Do many of your family or friends use?     Not important.     4A. Are you currently in a significant relationship?  no  4B.  If yes, how long?  NA  4C. Sexual Orientation:  heterosexual    5A. Who do you live with? Daughter, son, daughter's boyfriend, and their daughter.  5B. Tell me about their alcohol/drug use and mental health issues. No use, no MH.  5C. Are you concerned for your safety there? no  5D. Are you concerned about the safety of anyone else who lives with you? no    6A. Do you have children who live with you? yes, Explain:  son- 21, daughter &  boyfriend- 27, granddaughter- 6 months  If the person lives with children, ask follow-up questions to determine the person's relationship and responsibility, both legal and care giving; and what arrangements are made for supervision for the children when the person is not available.      6B. Do you have children who do not live with you?  yes, Explain:  son- 33  If yes, ask follow up questions to learn where the children are, who has custody and what the person't relaltionship and responsibility is with these children and what hopes the person has for his or her future with these children.    7A. Who supports you in making changes in your alcohol or drug use? What are they willing to do to support you? Who is upset or angry about you making changes in your alcohol or drug use? How big a problem is this for you?      Doctor, children, friend      7B. This table is provided to record information about the person s relationships and available support It is not necessary to ask each item; only to get a comprehensive picture of their support system.  How often can you count on the following people when you need someone?   Partner / Spouse    Parent(s)/Aunt(s)/Uncle(s)/Grandparents    Sibling(s)/Cousin(s)    Child(olga) Always supportive   Other relative(s)    Friend(s)/neighbor(s) Always supportive   Child(olga) s father(s)/mother(s)    Support group member(s)    Community of marco members    /counselor/therapist/healer Always supportive   Other (specify)      8A. What is your current living situation?     Client reports that she and her family live in a house that they rent.    8B. What is your long term plan for where you will be living?    Client states that they plan to stay where they are living for now.     8C. Tell me about your living environment/neighborhood? Ask enough follow up questions to determine safety, criminal activity, availability of alcohol and drugs, supportive or antagonistic to the  person making changes.      Safe. Quiet neighborhood. No concerns mentioned at this time.     9. Criminal justice history: Gather current/recent history and any significant history related to substance use--Arrests? Convictions? Circumstances? Alcohol or drug involvement? Sentences? Still on probation or parole? Expectations of the court? Current court order? Any sex offenses - lifetime? What level? (DSM)    Client denies.      10. What obstacles exist to participating in treatment? (Time off work, childcare, funding, transportation, pending USP time, living situation)    Childcare for her granddaughter.     Dimension VI Ratings   Recovery environment - The placing authority must use the criteria in Dimension VI to determine a client s recovery environment.   RISK DESCRIPTIONS - Severity ratin  Client is engaged in structured, meaningful activity, but peers, family, significant other, living environment are unsupportive, or there is criminal justice involvement by the client or among the client's peers, significatn others, or in the client's environment.    REASONS SEVERITY WAS ASSIGNED - (What support does the person have for making changes? What structure/stability does the person have in his or her daily life that willincrease the likelihood that changes can be sustained? What problems exist in the person s environment that will jeopardize getting/staying clean and sober?)     Client reports that she currently lives together with her children and granddaughter. Client reports that she is currently unemployed, but that she cares for her granddaughter when her daughter and her boyfriend are at work. It appears that client does have some meaningful activity to her days. Client reports that her family is supportive of her recovery at this time, but it does not appear as though she has a great deal of peer support currently. Client denies any legal involvement current or past.                Client  Choice/Exceptions     Would you like services specific to language, age, gender, culture, Catholic preference, race, ethnicity, sexual orientation or disability?  no    If yes, specify:      What particular treatment choices and options would you like to have?  Outpatient, evenings    Do you have a preference for a particular treatment program?  [facility]        DSM-V Criteria for Substance Abuse  Instructions  Determine whether the client currently meets the criteria for a Substance Use Disorder using the diagnostic criteria in the DSM-V, pp. 481-58. Current means during the most recent 12 months outside a facility that controls access to substances.    Category of substance Severity ICD-10 Code/DSM V Code   []  Alcohol Use Disorder [] Mild  [] Moderate  [] Severe [] (F10.10) (305.00)  [] (F10.20) (303.90)  [] (F10.20) (303.90)   [x]  Cannabis Use Disorder [] Mild  [x] Moderate  [] Severe [] (F12.10) (305.20)  [x] (F12.20) (304.30)  [] (F12.20) (304.30)   [] Hallucinogen Use Disorder [] Mild  [] Moderate  [] Severe [] (F16.10) (305.30)  [] (F16.20) (304.50)  [] (F16.20) (304.50)   []  Inhalant Use Disorder [] Mild  [] Moderate  [] Severe [] (F18.10) (305.90)  [] (F18.20) (304.60)  [] (F18.20) (304.60)   [x]  Opioid Use Disorder [] Mild  [] Moderate  [x] Severe [] (F11.10) (305.50)  [] (F11.20) (304.00)  [x] (F11.20) (304.00)   []  Sedative, Hypnotic, or Anxiolytic Use Disorder [] Mild  [] Moderate  [] Severe [] (F13.10) (305.40)   [] (F13.20) (304.10)  [] (F13.20) (304.10)   []  Stimulant Related Disorders [] Mild  [] Moderate  [] Severe [] (F15.10) (305.70) Amphetamine type substance  [] (F14.10) (305.60) Cocaine  [] (F15.10) (305.70) Other or unspecified stimulant  [] (F15.20) (304.40) Amphetamine type substance  [] (F14.20) (304.20) Cocaine  [] (F15.20) (304.40) Other or unspecified stimulant  [] (F15.20) (304.40) Amphetamine type substance  [] (F14.20) (304.20) Cocaine  [] (F15.20) (304.40) Other or  unspecified stimulant   []  Tobacco use Disorder [] Mild  [] Moderate  [] Severe [] (Z72.0) (305.1)  [] (F17.200) (305.1)  [] (F17.200) (305.1)   []  Other (or unknown) Substance Use Disorder [] Mild  [] Moderate  [] Severe [] (F19.10) (305.90)  [] (F19.20) (304.90)  [] (F19.20) (304.90)       Suggested Level of Care Necessary for Recovery  [x]  Inpatient  []  Extended Care []  Residential [x]  Outpatient  []  None            Collateral Contact Summary   Number of contacts made:  2 attempted  Contact with referring person:  No- client unwilling to sign ABRAM for her doctor.      If court related records were reviewed, summarize here:  NA     [x]   Information from collateral contacts supported/largely agreed with information from the client and associated risk ratings.   []   Information from collateral contacts was significantly different from information from the client and lead to different risk ratings.      Summarize new information here:      Rule 25 Assessment Summary and Plan   's Recommendation    Based on the information gathered in this assessment and from collateral information, the client meets DSM IV criteria for Opioid Use Disorder, Severe (F11.20).   Client appears appropriate for inpatient treatment at this time due to her pattern of use and history of of experiencing withdrawal symptoms.        Collateral Contacts     Please duplicate this page for each contact.  If this includes information which is sensitive and not public, separate this page from the rest of the assessment before sharing.  Retain the page in the assessment file.   Name    Ruth Smith Relationship    Friend Phone Number    [phone] Releases    yes       Information Provided:      Attempted to contact Ruth 3/27/17 @ 2:00pm. No answer, left message to return call with any information.     Ruth reported the following:    Knows that the client uses heroin every other day, and she uses with the client.   They are trying to  stop using together.   She is concerned about things being mixed in with the heroin and dying because of that and not knowing about it.   States that it is not good for either of them.   States that they are going back to Yazidism and going to meetings together, and are working on getting better.   Client is trying really hard to stop, as is she.   Client has moved and has stopped hanging out with old people.   They have a goal date set that they want to be sober by.       Collateral Contacts     Name    Melissa Hurt   Relationship    Daughter Phone Number    [phone] Releases    yes       Information Provided:     Attempted to contact Melissa 3/27/17 @ 2:05pm. No answer, left message to return call with any information.   Attempted to contact Melissa 3/31/17 @ 2:58pm. Phone was answered, with no response on the other end. No contact made, no message was able to be left.       Staff Name and Title:  STEPHANY Zuñiga    Date:  3/22/2017  Time:  1:39 PM

## 2021-06-16 PROBLEM — F11.90 METHADONE USE: Status: ACTIVE | Noted: 2019-07-07

## 2021-06-16 PROBLEM — F19.10 SUBSTANCE ABUSE (H): Status: ACTIVE | Noted: 2019-06-21

## 2021-06-16 PROBLEM — F11.11 HISTORY OF HEROIN ABUSE (H): Status: ACTIVE | Noted: 2019-08-13

## 2021-06-16 PROBLEM — M17.0 OSTEOARTHRITIS OF BOTH KNEES, UNSPECIFIED OSTEOARTHRITIS TYPE: Status: ACTIVE | Noted: 2019-07-07

## 2021-06-16 PROBLEM — F43.10 PTSD (POST-TRAUMATIC STRESS DISORDER): Status: ACTIVE | Noted: 2019-08-13

## 2021-06-16 PROBLEM — M19.041 PRIMARY OSTEOARTHRITIS OF BOTH HANDS: Status: ACTIVE | Noted: 2019-08-13

## 2021-06-16 PROBLEM — R73.03 PRE-DIABETES: Status: ACTIVE | Noted: 2019-08-13

## 2021-06-16 PROBLEM — M19.042 PRIMARY OSTEOARTHRITIS OF BOTH HANDS: Status: ACTIVE | Noted: 2019-08-13

## 2021-06-16 PROBLEM — M48.061 FORAMINAL STENOSIS OF LUMBAR REGION: Status: ACTIVE | Noted: 2019-08-13

## 2021-07-03 NOTE — ADDENDUM NOTE
Addendum Note by Maria Downing PA-C at 7/31/2019  1:40 PM     Author: Maria Downing PA-C Service: -- Author Type: Physician Assistant    Filed: 7/31/2019  4:17 PM Encounter Date: 7/31/2019 Status: Signed    : Maria Downing PA-C (Physician Assistant)    Addended by: MARIA DOWNING on: 7/31/2019 04:17 PM        Modules accepted: Orders

## 2021-08-17 ENCOUNTER — VIRTUAL VISIT (OUTPATIENT)
Dept: ADDICTION MEDICINE | Facility: CLINIC | Age: 56
End: 2021-08-17
Payer: MEDICARE

## 2021-08-17 DIAGNOSIS — F11.20 OPIOID USE DISORDER, SEVERE, DEPENDENCE (H): Primary | ICD-10-CM

## 2021-08-17 DIAGNOSIS — F12.10 CANNABIS USE DISORDER, MILD, ABUSE: ICD-10-CM

## 2021-08-17 PROCEDURE — H0001 ALCOHOL AND/OR DRUG ASSESS: HCPCS | Mod: PO
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
## 2021-08-20 ENCOUNTER — TELEPHONE (OUTPATIENT)
Dept: ADDICTION MEDICINE | Facility: CLINIC | Age: 56
End: 2021-08-20

## 2021-08-20 PROBLEM — F12.10 CANNABIS USE DISORDER, MILD, ABUSE: Status: ACTIVE | Noted: 2021-08-20

## 2021-08-20 PROBLEM — F11.20 OPIOID USE DISORDER, SEVERE, DEPENDENCE (H): Status: ACTIVE | Noted: 2021-08-20

## 2021-09-01 ENCOUNTER — VIRTUAL VISIT (OUTPATIENT)
Dept: ADDICTION MEDICINE | Facility: CLINIC | Age: 56
End: 2021-09-01
Payer: MEDICARE

## 2021-09-01 DIAGNOSIS — F11.20 OPIOID USE DISORDER, SEVERE, DEPENDENCE (H): Primary | ICD-10-CM

## 2021-09-01 PROCEDURE — 90834 PSYTX W PT 45 MINUTES: CPT | Mod: PO

## 2021-09-01 NOTE — PROGRESS NOTES
"Comprehensive Assessment Update     Based on client interview, review of previous assessments and   comprehensive assessment interview the following diagnosis and recommendations are:   Date: 2021     Client: Roger Fallon  MRN; [id number]   : 1965  Age: 56 year old Sex: female  Last 4 digits of Social Security: [id number]    Do you belong to a [place]: No Which [place]? NA [place]ide on reservation: [place]    Reason for admission today:  \"I need to get my dose back up.\"    Dimension I Acute intoxication/Withdrawal Potential:    Chemical use history (patient self-reported use within the last 30 days)  Primary Drug Used  Age of First Use  Most Recent Pattern of Use and Duration    Date of last use  Time if substance use in the last 30 days Withdrawal Potential? Requiring special care  Method of use   (oral, smoked, snort, IV, etc)    Alcohol          Marijuana/Hashish   Daily. \"Most the time it is when I am trying to go to bed, when I want to go to sleep.\" 2021 PM  Smoke   Cocaine/Crack          Meth/Amphetamines          Heroin  32 Reported use yesterday due to pain.  \"Before I started at the Methadone program it was every day, then it went to 3-4 days.\"  2021 Noon  Snort   Other Opiates/Synthetics          Inhalants          Benzodiazepines          Hallucinogens          Barbiturates/Sedatives/Hypnotics          Over-the-Counter Drugs          Other          Nicotine   1 pack every 3 days. 2021   Smoke     Is patient experiencing withdrawal at time of admission:  Yes, Client reports the following withdrawal symptoms; sweating.    Dimension One: Acute Intoxication/Withdrawal Potential     Ratin - Minor Problem   (Consider the client's ability to cope with withdrawal symptoms and current state of intoxication)       Reason Risk Rating Assigned: Patient reports last use of heroin and marijuana as 2021. Patient reports minimal withdrawal symptoms at this time. Patient is on MAT-Methadone. " Patient likely to experience withdrawal symptoms if Methadone were abruptly discontinued.    Dimension II Biomedical Conditions:    Physical Health Concerns/Conditions:  Patient Active Problem List    Diagnosis Date Noted     Opioid use disorder, severe, dependence (H) 2021     Priority: Medium     Cannabis use disorder, mild, abuse 2021     Priority: Medium     History of heroin abuse (H) 2019     Priority: Medium     Foraminal stenosis of lumbar region 2019     Priority: Medium     Osteoarthritis of both hands 2019     Priority: Medium     PTSD (post-traumatic stress disorder) 2019     Priority: Medium     Pre-diabetes 2019     Priority: Medium     2018: A1c = 6.1%         Osteoarthritis of both knees 2019     Priority: Medium     Methadone use (H) 2019     Priority: Medium     Substance abuse (H) 2019     Priority: Medium     Degeneration of intervertebral disc of lumbar region 2011     Priority: Medium     Complex partial epilepsy (H) 2011     Priority: Medium     Major depressive disorder, recurrent episode, mild (H) 05/10/2010     Priority: Medium     Reactive airway disease 2009     Priority: Medium     Migraine 2009     Priority: Medium     Hypercholesterolemia 2009     Priority: Medium     Insomnia 2008     Priority: Medium     Chronic pain 2008     Priority: Medium     Pituitary adenoma (H) 2008     Priority: Medium       Pain Screening:  Does the patient have any pain? Yes -  Pain ratin/10         When did it first begin?: Ongoing  How long does each episode last?: Constant  What causes or worsens it?:  NA  What relieves or lessens it?:  Injections  Would like this pain addressed during your stay: No  Staff have requested client inform staff of any new or different pain issue(s) that arise during their treatment stay: Yes      Dietary Screening:  Does the patient have a history of an eating  disorder or been over-eating, avoiding meals, or inducing vomiting?  No  Does the patient have any concerns regarding your nutritional status? If yes, please explain: no  Has the patient had any appetite changes in the last 3 months?  No  Has the patient had any weight loss or weight gain in the last 3 months? No    Dental Screening:  Does the patient have any dental concerns? (Problems with teeth, pain, cavities, braces)?  NO    Current Medications:  Current Outpatient Medications   Medication     escitalopram (LEXAPRO) 20 MG tablet     METFORMIN HCL PO     METHADONE HCL PO     OXcarbazepine (TRILEPTAL) 300 MG tablet     No current facility-administered medications for this visit.     Are you pregnant: Patient denies pregnancy.    Dimension Two: Biomedical Condition and Complications    Ratin - Minor Problem  (Consider the degree to which any physical disorder would interfere with treatment for substance abuse, and the client's ability to tolerate any related discomfort; determine the impact of continued chemical use on the unborn child if the client is pregnant)       Reason Risk Rating Assigned: Patient reports physical health is being addressed by providers. Patient has primary care provider. Patient is able to seek cares as needed.    Dimension III Emotional/Behavioral/Cognitive:      Oriented to person, place, time, situation?  Yes     GAIN-SS Tool:    (update flowsheet if screening has not been completed within the last 30 days)  When was the last time that you had significant problems... 2021   with feeling very trapped, lonely, sad, blue, depressed or hopeless about the future? 2 to 12 months ago   with sleep trouble, such as bad dreams, sleeping restlessly, or falling asleep during the day? Past Month   with feeling very anxious, nervous, tense, scared, panicked or like something bad was going to happen? 2 to 12 months ago   with becoming very distressed & upset when something reminded you of the  "past? 2 to 12 months ago   with thinking about ending your life or committing suicide? 1+ years ago     When was the last time that you did the following things 2 or more times? 2021   Had a hard time paying attention at school, work or home? Never   Had a hard time listening to instructions at school, work or home? Never       Dimension Three: Emotional/Behavioral/Cognitive Conditions & Complications    Ratin - Moderate Problem  (Determine the degree to which any condition or complications are likely to interfere with treatment for substance abuse or with functioning in significant life areas and the likelihood of risk of harm to self or others)      Reason Risk Rating Assigned: Patient denies mental health concerns though reports recently experiencing mental health symptoms. Patient reports grief related to death of parents. Patient does not have mental health care provider. Patient denies suicidal ideation.    Dimension IV Readiness to Change:    Does patient feel mandated or coerced into treatment:  No, but patient is on an intervention taper.     Does patient feel there is a problem:   Yes     Verbalization of need/desire to change:   Yes     Is patient willing to follow treatment recommendations: Yes     Dimension Four: Treatment Acceptance/Resistance     Ratin - Moderate Problem   (Consider the amount of support and encouragement necessary to keep the client involved in treatment)      Reason Risk Rating Assigned: Patient is cooperative. Patient is on intervention taper at [facility].    Dimension V Relapse/Continued Use/Continued Problem Potential:    Circumstances which led to most recent relapse: \"I was in so much pain yesterday and my dose just keeps going down.\"    Risk Taking/Problem Behaviors Related to Use and/or Under the Influence: None     Dimension Five: Continued Use/Relapse Prevention     Rating:3 - Severe Problem  (Consider the degree to which the client's recognizes relapse issues " and has the skills to prevent relapse of either substance use or mental health problems)     Reason Risk Rating Assigned: Patient lacks healthy, positive coping skills. Patient lacks skills to prevent relapse. Patient may not fully recognize impact substance use has on physical and/or mental health. Patient lacks significant periods of sobriety in the past. Patient reports no prior treatment episodes.    Dimension VI Recovery Environment:    People, places, or things that threaten recovery: No  Desired family involvement in treatment services: No    Has your 's license ever been revoked: No    Current/Pending legal involvement :  None    Current CPS involvement: NA    Current support network for recovery (including community-based recovery support): None    Current ability to function in a work and/or education setting: Average    Dimension Six: Recovery Environment     Ratin - Moderate Problem   (Consider the degree to which key areas of the client's life are supportive of or antagonistic to treatment participation and recovery)      Reason Risk Rating Assigned: Patient is unemployed. Patient has stable housing. Patient reports positive support system. Patient reports limited structured daily activities. Patient denies current legal issues. Patient reports past legals.    DSM-V Criteria for Substance Abuse  Instructions:  Determine whether the client currently meets the criteria for a Substance Use Disorder using the diagnostic criteria in the  DSM-V, pp. 481-589. Current means during the most recent 12 months outside a facility that controls access to substances.    Category of substance Severity ICD-10 Code/DSM V Code  Alcohol Use Disorder Mild  Moderate  Severe (F10.10) (305.00)  (F10.20) (303.90)  (F10.20) (303.90)   Cannabis Use Disorder Mild  Moderate  Severe (F12.10) (305.20)  (F12.20) (304.30)  (F12.20) (304.30)   Hallucinogen Use Disorder Mild  Moderate  Severe (F16.10) (305.30)  (F16.20)  (304.50)  (F16.20) (304.50)   Inhalant Use Disorder Mild  Moderate  Severe (F18.10) (305.90)  (F18.20) (304.60)  (F18.20) (304.60)   Opioid Use Disorder Mild  Moderate  Severe (F11.10) (305.50)  (F11.20) (304.00)  (F11.20) (304.00)   Sedative, Hypnotic, or Anxiolytic Use Disorder Mild  Moderate  Severe (F13.10) (305.40)  (F13.20) (304.10)  (F13.20) (304.10)   Stimulant Related Disorders Mild              Moderate              Severe   (F15.10) (305.70) Amphetamine type substance  (F14.10) (305.60) Cocaine  (F15.10) (305.70) Other or unspecified stimulant    (F15.20) (304.40) Amphetamine type substance  (F14.20) (304.20) Cocaine  (F15.20) (304.40) Other or unspecified stimulant    (F15.20) (304.40) Amphetamine type substance  (F14.20) (304.20) Cocaine  (F15.20) (304.40) Other or unspecified stimulant   DisorderTobacco use Disorder Mild  Moderate  Severe (Z72.0) (305.1)  (F17.200) (305.1)  (F17.200) (305.1)   Other (or unknown) Substance Use Disorder Mild  Moderate  Severe (F19.10) (305.90)  (F19.20) (304.90)  (F19.20) (304.90)     Patient meets criteria for:  304.00 (F11.20) Opioid Use Disorder Severe    I have reviewed the information on the assessment, medical history and checklist. Any pertinent updated information is included above, which was obtained from the client before attending the first day in group.     STEPHANY Ortiz  September 1, 2021   1:38 PM

## 2021-09-01 NOTE — PROGRESS NOTES
"  Outpatient Substance Use Disorder IS/[facility]/ [place] Re-Assessment:     Patient  Name: Roger Fallon  MRN: [id number]   : 1965  Admit Date: 2021       Initial Service Plan (ISP)    Immediate health, safety, and preliminary service needs identified and plan includes the following based on available information from patient, referral sources, and collateral information.    Safety (SI, SIB, suicide attempts, aggressive behaviors):  Patient denies immediate concerns.  Recommended that patient call 911 or go to the local ED should there be a change in any of these risk factors.     Health:  Patient is able to get medical care as needed. Client does NOT have health issues that would impede participation in treatment    Transportation: NA-virtual group.     Other:  To learn recovery skills to prevent relapse.       Issues to be addressed in the first sessions:   Orientation to program.  Introduction to peers.    Patient strengths and needs:   Strengths identified as \"I am pretty strong in all.\"  Needs identified as \"I need to improve my knees. Exercising.\"    Patient does not have any identified barriers to participating in referred services.    Plan for patient for time between intake and completion of the treatment plan:   Attend all group therapy sessions as directed, complete all written and oral assignments as directed, and remain clean and sober. A relapse, if any, must be reported to staff immediately in order to ensure you are receiving the proper level of care. If you cannot attend a group therapy session you must call contact information provided in intake folder and leave a message before or during group hours.       Vulnerable Adult Assessment:     Does the patient possess a physical or mental infirmity or other physical, mental, or emotional dysfunction?  No. Patient is not a vulnerable adult.    This patient is not a functional Vulnerable Adult according to [place] Statute 626.5572 " [place] [address].      [place] Re-Assessment:     Have you ever wished you were dead or that you could go to sleep and not wake up? Lifetime?  No   Past Month?  No     Have you actually had any thoughts of killing yourself?  Lifetime?  Yes.  Describe More than a year ago   Past Month?  No     Have you been thinking about how you might do this? Lifetime?  No   Past Month?  No     Have you had these thoughts and had some intention of acting on them?  Lifetime?  N/A   Past Month?  N/A     Have you started to work out the details of how to kill yourself?  Lifetime?  N/A   Past Month?  N/A     Do you intend to carry out this plan?   N/A     When you have the thoughts how long do they last?   N/A    Are there things - anyone or anything (ie Family, Confucianism, pain of death) that stopped you from wanting to die or acting on thoughts of suicide?   Does not apply        2008  [employer]  Used with permission by Margo Couch, PhD.          Staff Name/Title: STEPHANY Ortiz   Date: 9/1/2021   Time: 1:38 PM

## 2021-09-01 NOTE — PROGRESS NOTES
Individual Treatment Plan     Adult Chemical Dependency Program  Treatment Plan Requirements    These services are provided by the facility for each patient/client according to the individual's treatment plan:    Individual and group counseling    Education    Transition services    Services to address any co-occurring mental illness    Service coordination    Initial Treatment Plan Goals:  1. Complete all the requirements of Program Orientation.  2. Maintain medication compliance throughout the program.  3. Complete requirements for workshop/skills groups based on identified issues on your problem list.  4. Complete the support group attendance feedback sheet weekly.  5. Gain family involvement in treatment process to address family issues from the problem list.  6. Attend and participate in all required groups per individual treatment plan.  7. Focus attention to individualized issues from the treatment plan.  8. Complete all requirements for UA's, alcohol screening tests and other testing.  9. Schedule a physical examination if recommended.    In addition to the above, complete all individual goals as specifically outlines on your treatment plan.    Criteria for discharge:  Patients/clients are discharged from the program following completion of the entire program including Phase I and II or acceptance of other post-treatment referrals such as [facility] house, or aftercare at other facilities.  Patients/clients may also be discharged for inappropriate behavior or chemical use.      Favorable Discharge - Patients/clients have completed agreed upon treatment goals, understand their diagnosis and appear motivated about the follow-up care.    Guarded Discharge - Patients/clients have demonstrated some understanding of their diagnosis and recovery process, and have completed some of their treatment goals.  This prognosis also includes patients/clients who have completed some treatment goals but have not made commitment  to community support or follow through with referrals.    Unfavorable Discharge - Patients/clients have not completed agreed upon treatment goals due to their own choice, have limited understanding of their diagnosis, and have shown minimal or inconsistent behavior conducive to recovery.  Those patients/clients discharged due to behavioral problems will also be unfavorable discharges.    Adult CD Treatment Plan                                Roger Fallon  [id number]  1965  56 year old   female    Counselor: STEPHANY Pierre and STEPHANY York  Acute Intoxication/Withdrawal Potential     DIMENSION 1  RISK FACTOR: 1     SUBSTANCE USE DISORDERS:  Opioid Use Disorder Severe (F11.20)           Date Assigned Source Area of Treatment Focus / Goal / Treatment Strategies    Target  Date Initials Outcome Date Completed   9/1/2021 Self -  Current and Assessment -  Current  Area of Treatment Focus:  Patient reports date of last use of heroin and marijuana to be 08/31/2021.    Goal:  Develop effective strategies to maintain sobriety.   and Be able to manage mild to moderate withdrawal symptoms.    Treatment Strategies:  (Note: Must indicate the amount and frequency for each strategy)  1. Report to counselor any alcohol or drug use.  2. Complete requested drug screenings. 12/01/2021          Biomedical Conditions and Complaints     DIMENSION 2  RISK FACTOR: 1             Date Assigned Source Area of Treatment Focus / Goal / Treatment Strategies Target  Date Initials Outcome Date Completed   9/1/2021 Self -  Current and Assessment -  Current  Area of Treatment Focus:  Patient reports physical health is currently being monitored by providers.    Goal:  Maintain stable physical health and functioning.    Treatment Strategies:  (Note: Must indicate the amount and frequency for each strategy)  1.  Continue to follow recommendations from your primary care provider regarding medical concerns.   2.  Inform staff  immediately of any changes in your health that may affect your active participation in group therapy or attendance.  12/01/2021          Emotional/Behavioral/Cognitive Conditions and Complications     DIMENSION 3  RISK FACTOR: 2             Date Assigned Source Area of Treatment Focus / Goal / Treatment Strategies Target  Date Initials Outcome Date Completed     9/1/2021 Self -  Current and Assessment -  Current  Area of Treatment Focus:   Patient reports grief for loss and death.    Goal:  Manage mental health symptoms as they occur.    Treatment Strategies:  (Note: Must indicate the amount and frequency for each strategy)  1. Notify counselor of any changes in mental health symptoms as they occur. 12/01/2021           Readiness to Change     DIMENSION 4  RISK FACTOR: 2             Date Assigned Source Area of Treatment Focus / Goal / Treatment Strategies Target  Date Initials Outcome Date Completed   9/1/2021 Self -  Current and Assessment -  Current  Area of Treatment Focus:  Patient lacks internal and/or external motivation.    Goal:  Increase motivation towards recovery by participating in outpatient programming.     Treatment Strategies:  (Note: Must indicate the amount and frequency for each strategy)  1.  Patient to attend all scheduled groups: Monday, Wednesday, & Thursday 8:30AM-11:30AM and all scheduled individual counseling sessions.  2. Patient will contact staff beforehand if unable to attend group or will be late to group: STEPHANY Pierre; [email or web address] and STEPHANY York; [email or web address] 12/01/2021           Relapse/Continues Use/Continues Problem Potential     DIMENSION 5  RISK FACTOR: 3                 Date Assigned Source Area of Treatment Focus / Goal / Treatment Strategies Target  Date Initials Outcome Date Completed   9/1/2021 Self -  Current and Assessment -  Current  Area of Treatment Focus:  Patient lacks skills to maintain sobriety.    Goal:  Gain  skills to help prevent relapse.     Treatment Strategies:  (Note: Must indicate the amount and frequency for each strategy)  1. Develop a relapse prevention plan including lifestyle changes, recovery activities, daily structure, self-care, support systems, spirituality, work, finances, recreation and crisis management.   2. Patient to share in daily check-in any urges and addictive thinking to better understand her pattern of use and to prevent relapse in the future.  12/01/2021          Recovery Environment     DIMENSION 6  RISK FACTOR: 2                 Date Assigned Source Area of Treatment Focus / Goal / Treatment Strategies Target  Date Initials Outcome Date Completed   9/1/2021 Self -  Current and Assessment -  Current  Area of Treatment Focus:  Patient lacks structured daily activities.    Goal:  Add activities to daily routine.    Treatment Strategies:  (Note: Must indicate the amount and frequency for each strategy)  1. Explore activities of interest to add to routine. 12/01/2021        Resources  Resources to which the patient is being referred for problems when problems are to be addressed concurrently by another provider: Dr. Amaris BATISTA MD, PCP and all other providers as identified throughout treatment.     All interventions that are designated as current will need to be completed in order to transition out of treatment with a favorable prognosis. The treatment plan is a flexible document and a work in progress. Interventions and goals may be added at any time to customize this plan to each individual's needs. Client may work with clinician to change interventions as long as they pertain to the goals stipulated in the plan and/or are clinically driven.    STEPHANY Ortiz  September 1, 2021

## 2021-09-01 NOTE — PROGRESS NOTES
Substance Use Disorder Outpatient Treatment Intake Summary     Roger Fallon is a 56 year old female who is being evaluated via a billable video visit.      Telemedicine Visit: The patient's condition can be safely assessed and treated via synchronous audio and visual telemedicine encounter.      Reason for Telemedicine Visit: Services only offered telehealth    Originating Site (Patient Location): Patient's home    Distant Site (Provider Location): Provider Remote Setting- Home Office    Consent:  The patient/guardian has verbally consented to: the potential risks and benefits of telemedicine (video visit) versus in person care; bill my insurance or make self-payment for services provided; and responsibility for payment of non-covered services.     Mode of Communication:  Video Conference Medical Zoom    Call Started at: 1:30PM   Call Ended at: 2:16 PM    As the provider I attest to compliance with applicable laws and regulations related to telemedicine.    D) Roger Fallon is a 56 year old  Single Black or  female who is referred to [facility] program via [facility] with funding from Medicare/[facility]. Patient orientated x 3. Patient meets criteria for 304.00 (F11.20) Opioid Use Disorder Severe.  Patient appears appropriate for outpatient treatment.     A) Met with patient for 46 minutes on 9/1/2021 .  Completed intake assessment and preliminary paperwork. Patient was given and explained counselor & supervisor license number and contact information, Patient Bill of Rights, program rules/regulations, Program Abuse Prevention Plan, confidentiality & HIPPA policies, grievance procedure, presented ROIs, TB & HIV/AIDS policies & resources, and Vulnerable Adult policy.   Conducted Vulnerable Adult Assessment.     R) This patient is not a functional Vulnerable Adult according to [place] Statute 626.5572 subdivision 21.      Patient signed and agreed to  "counselor & supervisor license number and contact info, Patient Bill of Rights, group rules/regulations, Program Abuse Prevention Plan, confidentiality & HIPPA policies, grievance procedure,  ROIs, TB & HIV/AIDS policies & resources, and Vulnerable Adult policy. Patient scored low risk on C-SSRS screen. Patient denied suicidal ideation/intent/plan/means at this time.     Opioid Use Disorder: Yes   Provided \"Options for Opioid Treatment in [place] and Overdose Prevention\" No    T) Explained counselor & supervisor license number and contact info, Patient Bill of Rights, program rules/regulations, Program Abuse Prevention Plan, confidentiality & HIPPA policies, grievance procedure, presented ROIs, TB & HIV/AIDS policies & resources, and Vulnerable Adult policy. Patient expected to start group on 09/02/2021.    STEPHANY Ortiz  09/01/21 1:40 PM  "

## 2021-09-02 ENCOUNTER — VIRTUAL VISIT (OUTPATIENT)
Dept: ADDICTION MEDICINE | Facility: CLINIC | Age: 56
End: 2021-09-02
Payer: MEDICARE

## 2021-09-02 DIAGNOSIS — F11.20 OPIOID USE DISORDER, SEVERE, DEPENDENCE (H): Primary | ICD-10-CM

## 2021-09-02 PROCEDURE — 90853 GROUP PSYCHOTHERAPY: CPT | Mod: GT

## 2021-09-02 NOTE — GROUP NOTE
Group Therapy Documentation    PATIENT'S NAME: Roger Fallon  MRN:   [id number]  :   1965  ACCT. NUMBER: [id number]  DATE OF SERVICE: 21  START TIME:  8:30 AM  END TIME: 11:30 AM  FACILITATOR(S): Nevin Monet LADC  TOPIC: BEH Group Therapy  Number of patients attending the group:  7  Group Length:  3 Hours    Group Therapy Type: Addiction and Psychoeducation    Summary of Group / Topics Discussed:    Cognitive Therapy Techniques and Grief & Loss    Supervising MD: Dr. Garber     Telemedicine Visit: The patient's condition can be safely assessed and treated via synchronous audio and visual telemedicine encounter.      Reason for Telemedicine Visit: Services only offered telehealth    Originating Site (Patient Location): Patient's home    Distant Site (Provider Location): Provider Remote Setting- Home Office    Consent:  The patient/guardian has verbally consented to: the potential risks and benefits of telemedicine (video visit) versus in person care; bill my insurance or make self-payment for services provided; and responsibility for payment of non-covered services.     Mode of Communication:  Video Conference via [employer]    As the provider I attest to compliance with applicable laws and regulations related to telemedicine.        Group Attendance:  Attended group session    Patient's response to the group topic/interactions:  confronted peers appropriately and cooperative with task    Patient appeared to be Actively participating, Attentive and Engaged.        Client specific details:  It was the patient's first day in programming. She was active and engaged. She identified how a big sense of purpose for her is in her grandchildren at this time. She did report use yesterday due to pain. We will further discuss relapse prevention during next week's 1x1. .

## 2021-09-03 ENCOUNTER — DOCUMENTATION ONLY (OUTPATIENT)
Dept: ADDICTION MEDICINE | Facility: CLINIC | Age: 56
End: 2021-09-03

## 2021-09-03 NOTE — PROGRESS NOTES
[facility] Weekly Treatment Plan Review      ATTENDANCE for the following date span:  21-21    Date     Group Therapy No group hours No group  hours No group  hours 3 hours No group    Individual Therapy        Family Therapy        Other (Specify) Phase 1 &2  Phase 3  Phase 1  Phase 1 & 2        Patient did not have any absences during this time period.    Total hours: 3/108. Patient is in phase 1.     Weekly Treatment Plan Review     Treatment Plan initiated on: 21.    Dimension1: Acute Intoxication/Withdrawal Potential -   Previous Dimension Ratin  Current Dimension Ratin  Date of Last Use: 21  Any reports of withdrawal symptoms: Yes, patient reports some mild withdrawal symptoms. The discomfort of them has lead to continued use.   Narrative: Patient reports date of last use of opioids and marijuana to be on 21. She is on methodone and reports being tapered and that is leading to some withdrawal symptoms. Patient is working with her MAT clinic on dosing at this time.     Dimension 2: Biomedical Conditions & Complications -   Previous Dimension Ratin  Current Dimension Ratin  Medical Concerns:  Patient has bad knees, she also is being monitored by PCP for biomedical concerns.   Current Medications & Medication Changes:  Current Outpatient Medications   Medication     escitalopram (LEXAPRO) 20 MG tablet     METFORMIN HCL PO     METHADONE HCL PO     OXcarbazepine (TRILEPTAL) 300 MG tablet     No current facility-administered medications for this visit.     Medication Prescriber:  PCP and MAT   Taking meds as prescribed? Yes  Medication side effects or concerns:  Withdrawal from Methadone taper   Outside medical appointments this week (list provider and reason for visit):  MAT   Narrative: Patient reports both knees are in poor condition. Patient identified a need for knee replacements. She identified she is working with  providers to address her concerns. At this time there are no emergent concerns.     Dimension 3: Emotional/Behavioral Conditions & Complications -   Previous Dimension Ratin  Current Dimension Ratin  PHQ9   No flowsheet data found.  GAD7   No flowsheet data found.  Mental health diagnosis None   Date of last SIB:  NA  Date of  last SI:  NA  Date of last HI: NA  Behavioral Targets:  Develop coping skills to manage grief and loss.   Current MH Assignments:  Engage in mindfulness   Narrative:  Patient reports no mental health diagnoses. She did report experiencing significant grief and loss over the last week. Patient is not currently working with MH providers however may benefit. We will discuss benefits with providers over the next week in 1x1.     Dimension 4: Treatment Acceptance / Resistance -   Previous Dimension Ratin  Current Dimension Ratin  OCTAVIO Diagnosis:  304.30 (F12.20) Cannabis Use Disorder Severe     Opioid Use Disorder, Specify if:  On a maintenance therapy with a severity of:  304.00 (F11.20) Severe  Stage: 1  Commitment to tx process/Stage of change: Preparation   OCTAVIO assignments:  None at this time   Narrative: Patient presented for her first group and was active and engaged. She will meet with counselors for 1x1 next Wednesday and tx assignments will be provided then.     Dimension 5: Relapse / Continued Problem Potential -   Previous Dimension Rating:  3  Current Dimension Rating:  3  Relapses this week: YES, List patient reports use of opioids   Urges to use:  YES, List related to pain   UA results: No results found for this or any previous visit (from the past 168 hour(s)).  Narrative: Patient has no coping skills to manage urges or triggers as evidence by her use after admittance into OP programing. She identifies her use is related to pain and lack of pain management. She has no relapse prevention plan at this time. She reports she is motivated for recovery and has a desire  for abstinence so she is eligible for a knee replacement.     Dimension 6: Recovery Environment -   Previous Dimension Ratin  Current Dimension Ratin  Family Involvement : None    Summarize attendance at family groups and family sessions: NA  Family supportive of treatment?  Yes  Community support group attendance: None  Recreational activities: spending time with her grandchildren   Narrative: Patient has support from her family. Patient is not attending any sober support meetings at this time. 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 She reports she is looking forward to time with her family over the long weekend.     TOPIC  DATES   8 Dimensions of Wellness    Medical Aspects     Emotional Wellbeing/Mental Health     Acceptance     Relationships     Relapse Prevention     Sober Structure     Grief and Loss  21-21     Justification for Continued Treatment at this Level of Care:  Patient reports continued use and has not developed a relapse prevention plan.     Discharge Planning:  Target Discharge Date/Timeframe:  3/1/21   Med Mgmt Provider/Appt:  Daily due to MAT    Ind therapy Provider/Appt:  NA    Family therapy Provider/Appt:  NA   Other referrals:  Patient may need referral for pain clinic, PCP     Has vulnerable adult status change? N/A    Service Coordination:  Patient was introduced to group and peers. 1x1 scheduled for next week.     Supervision:  Patient is staffed with treatment providers, this includes: STEPHANY York and STEPHANY Pierre.       Are Treatment Plan goals/objectives effective? Yes  *If no, list changes to treatment plan:    Are the current goals meeting client's needs? Yes  *If no, list the changes to treatment plan.    Client Input / Response: Agree      *Client agrees with any changes to the treatment plan: Yes  *Client received copy of changes: Yes  *Client is aware of right to access a treatment plan review: Yes

## 2021-09-08 ENCOUNTER — VIRTUAL VISIT (OUTPATIENT)
Dept: ADDICTION MEDICINE | Facility: CLINIC | Age: 56
End: 2021-09-08
Payer: MEDICARE

## 2021-09-08 DIAGNOSIS — F11.20 OPIOID USE DISORDER, SEVERE, DEPENDENCE (H): Primary | ICD-10-CM

## 2021-09-08 PROCEDURE — 90853 GROUP PSYCHOTHERAPY: CPT | Mod: GT

## 2021-09-08 PROCEDURE — H2035 A/D TX PROGRAM, PER HOUR: HCPCS | Mod: PO

## 2021-09-08 NOTE — GROUP NOTE
Group Therapy Documentation    PATIENT'S NAME: Roger Fallon  MRN:   [id number]  :   1965  ACCT. NUMBER: [id number]  DATE OF SERVICE: 21  START TIME:  8:30 AM  END TIME: 11:30 AM  FACILITATOR(S): Nevin Monet LADC; Trista Best LADC  TOPIC: BEH Group Therapy  Number of patients attending the group:  4  Group Length:  3 Hours    Group Therapy Type: Addiction and Psychoeducation    Summary of Group / Topics Discussed:    Balanced Lifestyle , Cognitive Therapy Techniques, Coping Skills/Lifestyle Managemet, Interpersonal Effectiveness, Meditation/Breathing Exercises, and Mindfulness    Telemedicine Visit: The patient's condition can be safely assessed and treated via synchronous audio and visual telemedicine encounter.      Reason for Telemedicine Visit: Services only offered telehealth    Originating Site (Patient Location): Patient's home    Distant Site (Provider Location): Provider Remote Setting- Home Office    Consent:  The patient/guardian has verbally consented to: the potential risks and benefits of telemedicine (video visit) versus in person care; bill my insurance or make self-payment for services provided; and responsibility for payment of non-covered services.     Mode of Communication:  Video Conference via [employer]    As the provider I attest to compliance with applicable laws and regulations related to telemedicine.     Supervising MD: Dr. Garber        Group Attendance:  Attended group session    Patient's response to the group topic/interactions:  cooperative with task, discussed personal experience with topic, expressed understanding of topic and listened actively    Patient appeared to be Actively participating, Attentive and Engaged.        Client specific details:  Patient discussed going to [facility] [place] for the Labor day weekend. Pt shared that she did not use heroin while she was in [place], however admitted that she used since returning. Pt describes that she  uses about a quarter gram for pain management. She agreed to meet with counselors following group to discuss this, along with TX expectations and her TX plan. Pt reports no plans to return to use as her methadone dose will be raised again tomorrow at [facility].

## 2021-09-08 NOTE — PROGRESS NOTES
1x1;    [facility] and this writer met for 39 minutes to discuss treatment plan, goals, recent use and medical concerns. Patient reports use daily to every other day of heroin reporting last use was 9/7/21 using 1/4 gram via snorting. Patient reports withdrawal symptoms such as sweating and shaking in the AM until she doses on Methadone. Patient reports she will be going up in dose of Methadone tomorrow to 105 mg. Patient was told group expectations and it was stated if use continues she will be placed on tx contract and referred to a higher level of care. Patient was understanding of this. She did identify her use is related to pain management attempt and that she will follow up with providers regarding better pain management techniques. Patient, Trista and this writer discussed treatment plan and goals and at this time she is in agreement with all goals and treatment plan.     Follow up 1x1 will be scheduled.     STEPHANY Kenny 9/8/2021 3:59 PM

## 2021-09-09 ENCOUNTER — VIRTUAL VISIT (OUTPATIENT)
Dept: ADDICTION MEDICINE | Facility: CLINIC | Age: 56
End: 2021-09-09
Payer: MEDICARE

## 2021-09-09 DIAGNOSIS — F11.20 OPIOID USE DISORDER, SEVERE, DEPENDENCE (H): Primary | ICD-10-CM

## 2021-09-09 PROCEDURE — 90853 GROUP PSYCHOTHERAPY: CPT | Mod: GT

## 2021-09-09 NOTE — GROUP NOTE
Group Therapy Documentation    PATIENT'S NAME: Roger Fallon  MRN:   [id number]  :   1965  ACCT. NUMBER: [id number]  DATE OF SERVICE: 21  START TIME:  8:30 AM  END TIME: 11:30 AM  FACILITATOR(S): Nevin Monet LADC; Trista Best LADC  TOPIC: BEH Group Therapy  Number of patients attending the group:  7  Group Length:  3 Hours    Group Therapy Type: Addiction and Psychoeducation    Summary of Group / Topics Discussed:    Coping Skills/Lifestyle Managemet, Choices in Recovery, Grief & Loss, Interpersonal Effectiveness, Meditation/Breathing Exercises, Mindfulness, and Thinking Errors/Negative Self-Talk    Telemedicine Visit: The patient's condition can be safely assessed and treated via synchronous audio and visual telemedicine encounter.      Reason for Telemedicine Visit: Services only offered telehealth    Originating Site (Patient Location): Patient's home    Distant Site (Provider Location): Provider Remote Setting- Home Office    Consent:  The patient/guardian has verbally consented to: the potential risks and benefits of telemedicine (video visit) versus in person care; bill my insurance or make self-payment for services provided; and responsibility for payment of non-covered services.     Mode of Communication:  Video Conference via [employer]    As the provider I attest to compliance with applicable laws and regulations related to telemedicine    Supervising MD: Dr. Garber        Group Attendance:  Attended group session    Patient's response to the group topic/interactions:  cooperative with task, expressed understanding of topic, gave appropriate feedback to peers, listened actively and offered helpful suggestions to peers    Patient appeared to be Actively participating, Attentive and Engaged.        Client specific details:  Patient reports her MAT dose was increased this morning and she is feeling better. She reports she did not use any other substances for pain since  9/7/21. Patient participated in [facility] video group discussion on the negative consequences of substance use on health, emotions, relationships and life in general.

## 2021-09-10 ENCOUNTER — DOCUMENTATION ONLY (OUTPATIENT)
Dept: ADDICTION MEDICINE | Facility: CLINIC | Age: 56
End: 2021-09-10

## 2021-09-10 NOTE — PROGRESS NOTES
[facility] Weekly Treatment Plan Review      ATTENDANCE for the following date span:  21-21    Date     Group Therapy No group hours No group  hours 3 hours 3 hours No group    Individual Therapy        Family Therapy        Other (Specify) Phase 1 &2  Phase 3  Phase 1  Phase 1 & 2        Patient did not have any absences during this time period.    Total hours: 10/108. Patient is in phase 1.     Weekly Treatment Plan Review     Treatment Plan initiated on: 21.    Dimension1: Acute Intoxication/Withdrawal Potential -   Previous Dimension Ratin  Current Dimension Ratin  Date of Last Use: 21  Any reports of withdrawal symptoms: Yes, patient reports some mild withdrawal symptoms. The discomfort of them has lead to continued use.   Narrative: Patient reports date of last use of opioids and marijuana to be on 21. Patient's MAT clinic is working on increasing her dosage, with this patient reported last use to be on 21 of heroin. She reports mild withdrawal symptoms that are managed by dosing on Methadone.     Dimension 2: Biomedical Conditions & Complications -   Previous Dimension Ratin  Current Dimension Ratin  Medical Concerns:  Patient has bad knees, she also is being monitored by PCP for biomedical concerns.   Current Medications & Medication Changes:  Current Outpatient Medications   Medication     escitalopram (LEXAPRO) 20 MG tablet     METFORMIN HCL PO     METHADONE HCL PO     OXcarbazepine (TRILEPTAL) 300 MG tablet     No current facility-administered medications for this visit.     Medication Prescriber:  PCP and MAT   Taking meds as prescribed? Yes  Medication side effects or concerns:  Withdrawal from Methadone taper   Outside medical appointments this week (list provider and reason for visit):  MAT   Narrative: Patient reports both knees are in poor condition. Patient identified a need for knee replacements. She  identified she is working with providers to address her concerns.  Patient identifies she struggles to slow down and take it easy on her self, we discussed self care and importance as her physical health is the trigger for her substance use.     Dimension 3: Emotional/Behavioral Conditions & Complications -   Previous Dimension Ratin  Current Dimension Ratin  PHQ9   No flowsheet data found.  GAD7   No flowsheet data found.  Mental health diagnosis None   Date of last SIB:  NA  Date of  last SI:  NA  Date of last HI: NA  Behavioral Targets:  Develop coping skills to manage grief and loss.   Current MH Assignments:  Engage in mindfulness   Narrative:  Patient reports no mental health diagnoses. She did report experiencing significant grief and loss over the last year. Patient is not currently working with MH providers however may benefit. We discussed mental health services and patient is going to evaluate if she would find benefit in that. She reports no new emotional concerns.     Dimension 4: Treatment Acceptance / Resistance -   Previous Dimension Ratin  Current Dimension Ratin  OCTAVIO Diagnosis:  304.30 (F12.20) Cannabis Use Disorder Severe     Opioid Use Disorder, Specify if:  On a maintenance therapy with a severity of:  304.00 (F11.20) Severe  Stage: 1  Commitment to tx process/Stage of change: Preparation   OCTAVIO assignments:  None at this time   Narrative: Patient is an active and engaged group member. She was given her inital relapse prevention plan and will begin working on that. The expectations of outpatient programing were reiterated to patient in 1x1 setting and it was stated if patient is unable to comply a treatment contract will be placed.     Dimension 5: Relapse / Continued Problem Potential -   Previous Dimension Rating:  3  Current Dimension Rating:  3  Relapses this week: YES, List patient reports use of opioids   Urges to use:  YES, List related to pain   UA results: No results  found for this or any previous visit (from the past 168 hour(s)).  Narrative: Patient has no coping skills to manage urges or triggers as evidence by her use after admittance into OP programing. She identifies her use is related to pain and lack of pain management. She has no relapse prevention plan at this time. She reports she is motivated for recovery and has a desire for abstinence so she is eligible for a knee replacement. Patient is in need of coping skills as she has very few and they are not applied. Patient has been given a relapse prevention plan to complete.     Dimension 6: Recovery Environment -   Previous Dimension Ratin  Current Dimension Ratin  Family Involvement : None    Summarize attendance at family groups and family sessions: NA  Family supportive of treatment?  Yes  Community support group attendance: None  Recreational activities: spending time with her grandchildren   Narrative: Patient has support from her family. Patient is not attending any sober support meetings at this time. 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 She reports she is looking forward to time with her family over the long weekend. She is not currently attending sober support meetings although has been encouraged and given a meeting list. Patient reports no current legal concerns.     TOPIC  DATES   8 Dimensions of Wellness 21-9/10/21   Medical Aspects     Emotional Wellbeing/Mental Health     Acceptance     Relationships     Relapse Prevention     Sober Structure     Grief and Loss  21-21     Justification for Continued Treatment at this Level of Care:  Patient reports continued use and has not developed a relapse prevention plan.     Discharge Planning:  Target Discharge Date/Timeframe:  3/1/21   Med Mgmt Provider/Appt:  Daily due to MAT    Ind therapy Provider/Appt:  NA    Family therapy Provider/Appt:  NA   Other referrals:  Patient may need referral for pain  clinic, PCP     Has vulnerable adult status change? N/A    Service Coordination:  Patient was introduced to group and peers. 1x1 scheduled for next week.     Supervision:  Patient is staffed with treatment providers, this includes: STEPHANY York and STEPHANY Pierre.       Are Treatment Plan goals/objectives effective? Yes  *If no, list changes to treatment plan:    Are the current goals meeting client's needs? Yes  *If no, list the changes to treatment plan.    Client Input / Response: Agree      *Client agrees with any changes to the treatment plan: Yes  *Client received copy of changes: Yes  *Client is aware of right to access a treatment plan review: Yes

## 2021-09-13 ENCOUNTER — DOCUMENTATION ONLY (OUTPATIENT)
Dept: ADDICTION MEDICINE | Facility: CLINIC | Age: 56
End: 2021-09-13

## 2021-09-13 NOTE — PROGRESS NOTES
ABSENT NOTE:    Roger Fallon was absent from group 9/13/2021 . This absence was not excused. This writer attempted to contact patient via email. Patient is expected to be in group on 09/15/2021.     STEPHANY Ocampo  9/13/2021 , 3:50 PM

## 2021-09-15 ENCOUNTER — DOCUMENTATION ONLY (OUTPATIENT)
Dept: ADDICTION MEDICINE | Facility: CLINIC | Age: 56
End: 2021-09-15

## 2021-09-15 ENCOUNTER — VIRTUAL VISIT (OUTPATIENT)
Dept: ADDICTION MEDICINE | Facility: CLINIC | Age: 56
End: 2021-09-15
Payer: MEDICARE

## 2021-09-15 DIAGNOSIS — F11.20 OPIOID USE DISORDER, SEVERE, DEPENDENCE (H): Primary | ICD-10-CM

## 2021-09-15 PROCEDURE — 90853 GROUP PSYCHOTHERAPY: CPT | Mod: GT

## 2021-09-15 NOTE — PROGRESS NOTES
Addiction Outpatient Weekly Clinical Staffing     Roger Fallon was staffed on 9/15/2021 . Roger Fallon was staffed on recovery strengths, barriers and treatment progress.     Staff present: Blanco Juan [facility], Mojgan Dejesus [facility], Isabelle Hawk [facility], [facility], Jesus Cabrera [facility], Trista Best [facility] and Donald Welander, [facility], Damian Herrera,     Date: 9/15/2021 Time: 2:05 PM    Staff Signature: Trista Best [facility]

## 2021-09-15 NOTE — GROUP NOTE
Group Therapy Documentation    PATIENT'S NAME: Roger Fallon  MRN:   [id number]  :   1965  [id number]  DATE OF SERVICE: 9/15/21  START TIME:  8:30 AM  END TIME: 11:30 AM  FACILITATOR(S): Trista Best LADC  TOPIC: BEH Group Therapy  Number of patients attending the group:  3  Group Length:  3 Hours    Group Therapy Type: Addiction and Psychoeducation    Summary of Group / Topics Discussed:    Balanced Lifestyle , Cognitive Therapy Techniques, Coping Skills/Lifestyle Managemet, Leisure Exploration/Use of Leisure Time, Meditation/Breathing Exercises, Mindfulness, Proper Nutrition & Exercise, and Self-Care Activities    Telemedicine Visit: The patient's condition can be safely assessed and treated via synchronous audio and visual telemedicine encounter.      Reason for Telemedicine Visit: Services only offered telehealth    Originating Site (Patient Location): Patient's home    Distant Site (Provider Location): Provider Remote Setting- Home Office    Consent:  The patient/guardian has verbally consented to: the potential risks and benefits of telemedicine (video visit) versus in person care; bill my insurance or make self-payment for services provided; and responsibility for payment of non-covered services.     Mode of Communication:  Video Conference via [employer]    As the provider I attest to compliance with applicable laws and regulations related to telemedicine.     Supervising MD: Dr. Garber        Group Attendance:  Attended group session    Patient's response to the group topic/interactions:  cooperative with task, discussed personal experience with topic, expressed readiness to alter behaviors, expressed understanding of topic and gave appropriate feedback to peers    Patient appeared to be Actively participating, Attentive, Engaged and Distracted.        Client specific details:  Patient discussed she has not followed up on scheduling a PCP appt for cortizone injections for  over six months and is currently experiencing severe pain in both knees and loss of sleep. Patient states that she has only used OTC pain reliever beyond her daily MAT dose. Patient did appear to have difficulty keeping her eyes open during the last half hour of group session today. Patient discussed fears regarding scheduling knee replacement surgery in the winter months. She received encouraging feedback from peers who have experience with knee replacements.

## 2021-09-16 ENCOUNTER — VIRTUAL VISIT (OUTPATIENT)
Dept: ADDICTION MEDICINE | Facility: CLINIC | Age: 56
End: 2021-09-16
Payer: MEDICARE

## 2021-09-16 DIAGNOSIS — F11.20 OPIOID USE DISORDER, SEVERE, DEPENDENCE (H): Primary | ICD-10-CM

## 2021-09-16 PROCEDURE — 90853 GROUP PSYCHOTHERAPY: CPT | Mod: GT

## 2021-09-16 NOTE — GROUP NOTE
Group Therapy Documentation    PATIENT'S NAME: Roger Fallon  MRN:   [id number]  :   1965  AC[facility]. NUMBER: [id number]  DATE OF SERVICE: 21  START TIME:  8:30 AM  END TIME: 11:30 AM  FACILITATOR(S): Trista Best LADC  TOPIC: BEH Group Therapy  Number of patients attending the group:  6  Group Length:  3 Hours    Group Therapy Type: Addiction and Psychoeducation    Summary of Group / Topics Discussed:    Balanced Lifestyle , Cognitive Therapy Techniques, Coping Skills/Lifestyle Managemet, Choices in Recovery, Leisure Exploration/Use of Leisure Time, Meditation/Breathing Exercises, Mindfulness, Proper Nutrition & Exercise, Relaxation Techniques, Self-Care Activities, and Sleep Hygiene    Telemedicine Visit: The patient's condition can be safely assessed and treated via synchronous audio and visual telemedicine encounter.      Reason for Telemedicine Visit: Services only offered telehealth    Originating Site (Patient Location): Patient's home    Distant Site (Provider Location): Provider Remote Setting- Home Office    Consent:  The patient/guardian has verbally consented to: the potential risks and benefits of telemedicine (video visit) versus in person care; bill my insurance or make self-payment for services provided; and responsibility for payment of non-covered services.     Mode of Communication:  Video Conference via [employer]    As the provider I attest to compliance with applicable laws and regulations related to telemedicine.     Supervising MD: Dr. Garber        Group Attendance:  Attended group session    Patient's response to the group topic/interactions:  cooperative with task, discussed personal experience with topic, expressed understanding of topic, gave appropriate feedback to peers and listened actively    Patient appeared to be Actively participating, Attentive and Engaged.        Client specific details:  Patient discussed continuing knee pain, identify her pain level as a 5  on a scale of 1-10. Pt describes looking forward to upcoming ortho appointment on 9/22/21 for cortizone injection. Pt agreed to meet with this writer after group session today to discuss her needing to take her granddaughter to  at 9:15 am.

## 2021-09-17 ENCOUNTER — DOCUMENTATION ONLY (OUTPATIENT)
Dept: ADDICTION MEDICINE | Facility: CLINIC | Age: 56
End: 2021-09-17

## 2021-09-17 NOTE — PROGRESS NOTES
[facility] Weekly Treatment Plan Review      ATTENDANCE for the following date span:  21-21    Date     Group Therapy Absent    0 hours No group  hours 3 hours 3 hours No group    Individual Therapy        Family Therapy        Other (Specify) Phase 1 &2  Phase 3  Phase 1  Phase 1 & 2        Patient had 1/3 absences during this time period.    Total hours: 16/108. Patient is in phase 1.     Weekly Treatment Plan Review     Treatment Plan initiated on: 21.    Dimension1: Acute Intoxication/Withdrawal Potential -   Previous Dimension Ratin  Current Dimension Ratin  Date of Last Use: 21  Any reports of withdrawal symptoms: Yes, patient reports some mild withdrawal symptoms. The discomfort of them has lead to continued use.   Narrative: Patient reports date of last use of opioids and marijuana to be on 21. Patient's MAT clinic increased her dosage on 10/10/21.  Patient denies any further use of heroin since last reported use on 21. She reports mild no current withdrawal symptoms.     Dimension 2: Biomedical Conditions & Complications -   Previous Dimension Ratin  Current Dimension Ratin  Medical Concerns:  Patient has bad knees, she also is being monitored by PCP for biomedical concerns.   Current Medications & Medication Changes:  Current Outpatient Medications   Medication     escitalopram (LEXAPRO) 20 MG tablet     METFORMIN HCL PO     METHADONE HCL PO     OXcarbazepine (TRILEPTAL) 300 MG tablet     No current facility-administered medications for this visit.     Medication Prescriber:  PCP and MAT   Taking meds as prescribed? Yes  Medication side effects or concerns:  Withdrawal from Methadone taper   Outside medical appointments this week (list provider and reason for visit):  MAT   Narrative: Patient reports both knees are in poor condition. Patient identified a need for knee replacements. She identified she is working  with providers to address her concerns.  Patient identifies she struggles to slow down and take it easy on her self, we discussed self care and importance as her physical health is the trigger for her substance use. This week, patient reports she has an appointment with orthopedic clinic for her 6-month cortizone injections. Patient discussed hesitancy to receive knee replacement surgery because of mobility issues walking down her house stairs in the winter. Patient was given feedback by peers who have knowledge of that surgery recommending that she have it done for better quality of life. She reports she will discuss it with the doctor during upcoming ortho appointment.      Dimension 3: Emotional/Behavioral Conditions & Complications -   Previous Dimension Ratin  Current Dimension Ratin  PHQ9   No flowsheet data found.  GAD7   No flowsheet data found.  Mental health diagnosis None   Date of last SIB:  NA  Date of  last SI:  NA  Date of last HI: NA  Behavioral Targets:  Develop coping skills to manage grief and loss.   Current MH Assignments:  Engage in mindfulness   Narrative:  Patient reports no mental health diagnoses. She did report experiencing significant grief and loss over the last year. Patient is not currently working with MH providers however may benefit. Patient discussed some stresses this week due to feeling overwhelmed with responsibilities she has regarding juggling daily tasks with business, personal and care for her grandchildren. This writer met with patient briefly following group session on 21 to discuss those concerns and discuss recommendations for her to consider receiving MH therapy for added support for at least the first year of recovery.     Dimension 4: Treatment Acceptance / Resistance -   Previous Dimension Ratin  Current Dimension Ratin  OCTAVIO Diagnosis:  304.30 (F12.20) Cannabis Use Disorder Severe     Opioid Use Disorder, Specify if:  On a maintenance therapy  with a severity of:  304.00 (F11.20) Severe  Stage: 1  Commitment to tx process/Stage of change: Preparation   OCTAVIO assignments:  None at this time   Narrative: Patient is an active and engaged group member. She was given her inital relapse prevention plan and will begin working on that. The expectations of outpatient programing were reiterated to patient in 1x1 setting and it was stated if patient is unable to comply a treatment contract will be placed. Patient is in phase 1 of SRP and attended 2/3 group sessions this week. She gave no explanation as to why she was not in group on Monday. Patient came to group on Tuesday, though informed this writer that she has a dilemma in that she has to drive her granddaughter to  every morning at 9:15 am and that will only take her out of group for about 20 minutes. Writer did agree that she would be allowed to do it for the two days of this week, and will need to determine if this with be acceptable going forward after staffing it with co-counselor Nevin Monet. Patient left zoom group at 9:05am on both days and returned before 9:30 am. Patient appears to be open to receiving treatment at this time, however, this writers observation suggests that she does not appear to be fully committed to sobriety as of yet. This weeks topic was on Access to Care and Self-Care. Patient participated in morning guided meditations, check-in's and group education discussions.     Dimension 5: Relapse / Continued Problem Potential -   Previous Dimension Rating:  3  Current Dimension Rating:  3  Relapses this week: YES, List patient reports use of opioids   Urges to use:  YES, List related to pain   UA results: No results found for this or any previous visit (from the past 168 hour(s)).  Narrative: Patient has no coping skills to manage urges or triggers as evidence by her use after admittance into OP programing. She identifies her use is related to pain and lack of pain  management. She has no relapse prevention plan at this time. She reports she is motivated for recovery and has a desire for abstinence so she is eligible for a knee replacement. Patient is in need of coping skills as she has very few and they are not applied. Patient has been given a relapse prevention plan to complete. Patient reports experiencing triggers and urges to use this week have been due to pain. She reports avoiding those urges by pushing through them. Patient may be at high risk for further use due to lacking in personal coping skills. Patient was informed that she may be requested to submit for random UA testing. Also, counselors will be contacting her [facility] counselor for coordination of care services. Patient verbalized that she understood.     Dimension 6: Recovery Environment -   Previous Dimension Ratin  Current Dimension Ratin  Family Involvement : None    Summarize attendance at family groups and family sessions: NA  Family supportive of treatment?  Yes  Community support group attendance: None  Recreational activities: spending time with her grandchildren   Narrative: Patient has support from her family and that her son calls her daily. Patient is not attending any sober support meetings at this time. 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 She is not currently attending sober support meetings although has been encouraged and given a meeting list. Patient reports no current legal concerns. Patient reports no weekend plans other to watch a movies with her granddaughter.     TOPIC  DATES   8 Dimensions of Wellness 21-9/10/21   Medical Aspects  -21   Emotional Wellbeing/Mental Health     Acceptance     Relationships     Relapse Prevention     Sober Structure     Grief and Loss  21-21     Justification for Continued Treatment at this Level of Care:  Patient reports she will receive a cortizone treatment for knee pain on 21.  Patient reports her MAT dose was increased on 9/10/21, and has not developed a relapse prevention plan. [facility] counselors will collaborate with [facility] counselor for care coordination.     Discharge Planning:  Target Discharge Date/Timeframe:  3/1/21   Med Mgmt Provider/Appt:  Daily due to MAT    Ind therapy Provider/Appt:  NA    Family therapy Provider/Appt:  NA   Other referrals:  Patient may need referral for pain clinic, PCP     Has vulnerable adult status change? N/A    Service Coordination:  Patient was introduced to group and peers. 1x1 scheduled for next week.     Supervision:  Patient is staffed with treatment providers, this includes: STEPHANY York and STEPHANY Pierre.       Are Treatment Plan goals/objectives effective? Yes  *If no, list changes to treatment plan:    Are the current goals meeting client's needs? Yes  *If no, list the changes to treatment plan.    Client Input / Response: Agree      *Client agrees with any changes to the treatment plan: Yes  *Client received copy of changes: Yes  *Client is aware of right to access a treatment plan review: Yes

## 2021-09-20 ENCOUNTER — VIRTUAL VISIT (OUTPATIENT)
Dept: ADDICTION MEDICINE | Facility: CLINIC | Age: 56
End: 2021-09-20
Payer: MEDICARE

## 2021-09-20 DIAGNOSIS — F11.20 OPIOID USE DISORDER, SEVERE, DEPENDENCE (H): Primary | ICD-10-CM

## 2021-09-20 PROCEDURE — 90853 GROUP PSYCHOTHERAPY: CPT | Mod: PO
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
## 2021-09-23 ENCOUNTER — DOCUMENTATION ONLY (OUTPATIENT)
Dept: ADDICTION MEDICINE | Facility: CLINIC | Age: 56
End: 2021-09-23

## 2021-09-23 NOTE — PROGRESS NOTES
ABSENT NOTE:    Roger Fallon was absent from group 9/23/2021 . This absence was not excused. This writer attempted to contact patient via phone and email. Patient is expected to be in group on 09/27/21.     STEPHANY Ocampo  9/23/2021 , 12:02 PM

## 2021-09-24 ENCOUNTER — DOCUMENTATION ONLY (OUTPATIENT)
Dept: ADDICTION MEDICINE | Facility: CLINIC | Age: 56
End: 2021-09-24

## 2021-09-24 NOTE — PROGRESS NOTES
[facility] Weekly Treatment Plan Review      ATTENDANCE for the following date span:  21-21    Date     Group Therapy 3    0 hours No group  hours No  hours No hours No group    Individual Therapy        Family Therapy        Other (Specify) Phase 1 &2  Phase 3  Phase 1  Phase 1 & 2        Patient had 2 absences during this time period. Patient was absent on Wednesday due to needing a Cortizone injection. Patient was a NS on Thursday due to family emergency.     Total hours: /. Patient is in phase 1.     Weekly Treatment Plan Review     Treatment Plan initiated on: 21.    Dimension1: Acute Intoxication/Withdrawal Potential -   Previous Dimension Ratin  Current Dimension Ratin  Date of Last Use: 21  Any reports of withdrawal symptoms: Yes, Patient has withdrawal symptoms if she does not take her Methadone at scheduled time  Narrative: Patient reports date of last use of opioids and marijuana to be on 21. Patient's MAT clinic increased her dosage on 10/10/21.  Patient denies any further use of heroin since last reported use on 21. She reports mild withdrawal symptoms related to MAT wearing off.     Dimension 2: Biomedical Conditions & Complications -   Previous Dimension Ratin  Current Dimension Ratin  Medical Concerns:  Patient has bad knees, she also is being monitored by PCP for biomedical concerns.   Current Medications & Medication Changes:  Current Outpatient Medications   Medication     escitalopram (LEXAPRO) 20 MG tablet     METFORMIN HCL PO     METHADONE HCL PO     OXcarbazepine (TRILEPTAL) 300 MG tablet     No current facility-administered medications for this visit.     Medication Prescriber:  PCP and MAT   Taking meds as prescribed? Yes  Medication side effects or concerns:  Withdrawal from Methadone taper   Outside medical appointments this week (list provider and reason for visit):  Patient had cortizone  injection this last week.   Narrative: Patient reports both knees are in poor condition. Patient identified a need for knee replacements. She identified she is working with providers to address her concerns.  Patient identifies she struggles to slow down and take it easy on her self, we discussed self care and importance as her physical health is the trigger for her substance use. This week, patient reports she had an appointment with orthopedic clinic for her 6-month cortizone injections. Patient discussed hesitancy to receive knee replacement surgery because of mobility issues walking down her house stairs in the winter. Patient reported no emergent concerns over the last week.     Dimension 3: Emotional/Behavioral Conditions & Complications -   Previous Dimension Ratin  Current Dimension Ratin  PHQ9   No flowsheet data found.  GAD7   No flowsheet data found.  Mental health diagnosis None   Date of last SIB:  NA  Date of  last SI:  NA  Date of last HI: NA  Behavioral Targets:  Develop coping skills to manage grief and loss.   Current MH Assignments:  Engage in mindfulness   Narrative:  Patient reports no mental health diagnoses. She did report experiencing significant grief and loss over the last year. Patient is not currently working with  providers however may benefit. Patient discussed some stresses this week due to feeling overwhelmed with responsibilities she has regarding juggling daily tasks with business, personal and care for her grandchildren. Patient has family stressors and patient tends to take on a caretaker role in her family system. Patient is working on relationships and boundaries as well as taking time for self care. Individual therapy has been recommended to patient.      Dimension 4: Treatment Acceptance / Resistance -   Previous Dimension Ratin  Current Dimension Ratin  OCTAVIO Diagnosis:  304.30 (F12.20) Cannabis Use Disorder Severe     Opioid Use Disorder, Specify if:  On  a maintenance therapy with a severity of:  304.00 (F11.20) Severe  Stage: 1  Commitment to tx process/Stage of change: Preparation   OCTAVIO assignments:  None at this time   Narrative: Patient is an active and engaged group member. She was given her inital relapse prevention plan and will begin working on that. The expectations of outpatient programing were reiterated to patient in 1x1 setting and it was stated if patient is unable to comply a treatment contract will be placed. Patient is in phase 1 of SRP. Patient and counselors will discuss concerns regarding patient attendance and expectations will also be discussed.     Dimension 5: Relapse / Continued Problem Potential -   Previous Dimension Rating:  3  Current Dimension Rating:  3  Relapses this week: YES, List patient reports use of opioids   Urges to use:  YES, List related to pain   UA results: No results found for this or any previous visit (from the past 168 hour(s)).  Narrative: Patient has no coping skills to manage urges or triggers as evidence by her use after admittance into OP programing. She identifies her use is related to pain and lack of pain management. She has no relapse prevention plan at this time. She reports she is motivated for recovery and has a desire for abstinence so she is eligible for a knee replacement. Patient is in need of coping skills as she has very few and they are not applied. Patient has been given a relapse prevention plan to complete. Patient reports experiencing triggers and urges to use this week have been due to pain. She reports avoiding those urges by pushing through them. Patient may be at high risk for further use due to lacking in personal coping skills. Patient was informed that she may be requested to submit for random UA testing. Also, counselors will be contacting her [facility] counselor for coordination of care services. Patient verbalized that she understood. No changes ad patient had limited attendance.      Dimension 6: Recovery Environment -   Previous Dimension Ratin  Current Dimension Ratin  Family Involvement : None    Summarize attendance at family groups and family sessions: NA  Family supportive of treatment?  Yes  Community support group attendance: None  Recreational activities: spending time with her grandchildren   Narrative: Patient has support from her family and that her son calls her daily. Patient is not attending any sober support meetings at this time. 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 She is not currently attending sober support meetings although has been encouraged and given a meeting list. Patient reports no current legal concerns. Patient would benefit from additional structure and sober support.     TOPIC  DATES   8 Dimensions of Wellness 21-9/10/21   Medical Aspects  -21   Emotional Wellbeing/Mental Health  -21   Acceptance     Relationships     Relapse Prevention     Sober Structure     Grief and Loss  21-21     Justification for Continued Treatment at this Level of Care:  Patient reports she will receive a cortizone treatment for knee pain on 21. Patient reports her MAT dose was increased on 9/10/21, and has not developed a relapse prevention plan. [facility] [place] [facility] will collaborate with [facility] counselor for care coordination.     Discharge Planning:  Target Discharge Date/Timeframe:  3/1/21   Med Mgmt Provider/Appt:  Daily due to MAT    Ind therapy Provider/Appt:  NA    Family therapy Provider/Appt:  NA   Other referrals:  Patient may need referral for pain clinic, PCP     Has vulnerable adult status change? N/A    Service Coordination:  Patient was introduced to group and peers. 1x1 scheduled for next week.     Supervision:  Patient is staffed with treatment providers, this includes: STEPHANY York and STEPHANY Pierre.       Are Treatment Plan goals/objectives effective? Yes  *If no, list  changes to treatment plan:    Are the current goals meeting client's needs? Yes  *If no, list the changes to treatment plan.    Client Input / Response: Agree      *Client agrees with any changes to the treatment plan: Yes  *Client received copy of changes: Yes  *Client is aware of right to access a treatment plan review: Yes

## 2021-09-27 ENCOUNTER — VIRTUAL VISIT (OUTPATIENT)
Dept: ADDICTION MEDICINE | Facility: CLINIC | Age: 56
End: 2021-09-27
Payer: MEDICARE

## 2021-09-27 DIAGNOSIS — F11.20 OPIOID USE DISORDER, SEVERE, DEPENDENCE (H): Primary | ICD-10-CM

## 2021-09-27 PROCEDURE — 90853 GROUP PSYCHOTHERAPY: CPT | Mod: PO

## 2021-09-27 NOTE — GROUP NOTE
Group Therapy Documentation    PATIENT'S NAME: Roger Fallon  MRN:   [id number]  :   1965  ACCT. NUMBER: [id number]  DATE OF SERVICE: 21  START TIME:  8:30 AM  END TIME: 11:30 AM  FACILITATOR(S): Nevin Monet LADC; Trista Best LADC  TOPIC: BEH Group Therapy  Number of patients attending the group:  7  Group Length:  3 Hours    Group Therapy Type: Addiction and Psychoeducation    Summary of Group / Topics Discussed:    Boundaries, Cognitive Therapy Techniques, Coping Skills/Lifestyle Managemet, Emotional Regulation, Meditation/Breathing Exercises, Mindfulness, Self-Esteem/Self [place], and Thinking Errors/Negative Self-Talk    Telemedicine Visit: The patient's condition can be safely assessed and treated via synchronous audio and visual telemedicine encounter.      Reason for Telemedicine Visit: Services only offered telehealth    Originating Site (Patient Location): Patient's home    Distant Site (Provider Location): Provider Remote Setting- Home Office    Consent:  The patient/guardian has verbally consented to: the potential risks and benefits of telemedicine (video visit) versus in person care; bill my insurance or make self-payment for services provided; and responsibility for payment of non-covered services.     Mode of Communication:  Video Conference via [employer]    As the provider I attest to compliance with applicable laws and regulations related to telemedicine.     Supervising MD: Dr. Garber        Group Attendance:  Attended group session    Patient's response to the group topic/interactions:  cooperative with task, discussed personal experience with topic, expressed readiness to alter behaviors, expressed understanding of topic and listened actively    Patient appeared to be Actively participating, Attentive and Engaged.        Client specific details: Patient discussed her reasons for not attending group last week on 21 was because her son had car problems. She  describes that her other son to the ER for a medical emergency and did use 1 line of heroin after that, then stopped. This was processed during group session. Pt met with counselors for discussion regarding counselors being mandated reporters. Pt denied that her granddaughter was with her when she used. Due to her being late again for group session this morning, counselors discussed requirents of her being on time for group sessions. Pt agreed to meet for 1x1 with counselors this week on 9/29/21. Patient attended 2 hours of SRP on this day.

## 2021-09-29 ENCOUNTER — VIRTUAL VISIT (OUTPATIENT)
Dept: ADDICTION MEDICINE | Facility: CLINIC | Age: 56
End: 2021-09-29
Payer: MEDICARE

## 2021-09-29 DIAGNOSIS — F11.20 OPIOID USE DISORDER, SEVERE, DEPENDENCE (H): Primary | ICD-10-CM

## 2021-09-29 PROCEDURE — 90853 GROUP PSYCHOTHERAPY: CPT | Mod: GT

## 2021-09-29 PROCEDURE — H2035 A/D TX PROGRAM, PER HOUR: HCPCS | Mod: PO,59

## 2021-09-29 NOTE — GROUP NOTE
Group Therapy Documentation    PATIENT'S NAME: Roger Fallon  MRN:   [id number]  :   1965  ACCT. NUMBER: [id number]  DATE OF SERVICE: 21  START TIME:  8:30 AM  END TIME: 11:30 AM  FACILITATOR(S): Trista Best LADC; Nevin Monet LADC  TOPIC: BEH Group Therapy  Number of patients attending the group:  4  Group Length:  3 Hours    Group Therapy Type: Addiction and Psychoeducation    Summary of Group / Topics Discussed:    Cognitive Therapy Techniques, Co-occurring Illness, Coping Skills/Lifestyle Managemet, Meditation/Breathing Exercises, Mindfulness, Stress Management, Symptom Management, and Thinking Errors/Negative Self-Talk    Telemedicine Visit: The patient's condition can be safely assessed and treated via synchronous audio and visual telemedicine encounter.      Reason for Telemedicine Visit: Services only offered telehealth    Originating Site (Patient Location): Patient's home    Distant Site (Provider Location): Provider Remote Setting- Home Office    Consent:  The patient/guardian has verbally consented to: the potential risks and benefits of telemedicine (video visit) versus in person care; bill my insurance or make self-payment for services provided; and responsibility for payment of non-covered services.     Mode of Communication:  Video Conference via [employer]olar    As the provider I attest to compliance with applicable laws and regulations related to telemedicine.     Supervising MD: Dr. Garber        Group Attendance:  Attended group session   Patient attended 2 hours of SRP group on this day.     Patient's response to the group topic/interactions:  cooperative with task, expressed readiness to alter behaviors, expressed understanding of topic, gave appropriate feedback to peers and listened actively    Patient appeared to be Actively participating, Attentive and Engaged.        Client specific details:  Pt discussed her mental health as stable and clear minded today.  She describes her barriers to recovery have previously been pain, however is working on using coping skills more effectively to be able to push through. Patient participated in group discussion on PAWS and PWS.

## 2021-09-29 NOTE — PROGRESS NOTES
1x1:    Patient, Trista Best [facility] and this writer met for 35 minutes to discuss patient relapse. Patient reports relapse over the last week due to external stressors. Patient is now being placed on a treatment contract due to her continued use. Patient reports she has been dealing with family stressors and grief and loss over the last year, patient was offered individual therapy however declined. We discussed revisiting therapeutic services again at phase 2 step down. Patient discussed racial disparities she has encountered during her lifetime and became teary eyed when discussing. We validated her experience, discussed importance of talking about concerns and identified things she can do to cope at this time. Patient identifies her family as primary support and reaches out as needed. Patient will meet with this writer in the coming week(s) to follow up on relapse prevention.     Telemedicine Visit: The patient's condition can be safely assessed and treated via synchronous audio and visual telemedicine encounter.      Reason for Telemedicine Visit: Services only offered telehealth    Originating Site (Patient Location): Patient's home    Distant Site (Provider Location): Provider Remote Setting- Home Office    Consent:  The patient/guardian has verbally consented to: the potential risks and benefits of telemedicine (video visit) versus in person care; bill my insurance or make self-payment for services provided; and responsibility for payment of non-covered services.     Mode of Communication:  Video Conference via [employer]    As the provider I attest to compliance with applicable laws and regulations related to telemedicine.    Supervising MD: Dr. Jcarlos Monet, [facility] 9/29/2021 4:28 PM

## 2021-09-30 ENCOUNTER — VIRTUAL VISIT (OUTPATIENT)
Dept: ADDICTION MEDICINE | Facility: CLINIC | Age: 56
End: 2021-09-30
Payer: MEDICARE

## 2021-09-30 DIAGNOSIS — F11.20 OPIOID USE DISORDER, SEVERE, DEPENDENCE (H): Primary | ICD-10-CM

## 2021-09-30 PROCEDURE — 90853 GROUP PSYCHOTHERAPY: CPT | Mod: GT

## 2021-10-01 ENCOUNTER — DOCUMENTATION ONLY (OUTPATIENT)
Dept: ADDICTION MEDICINE | Facility: CLINIC | Age: 56
End: 2021-10-01

## 2021-10-01 NOTE — PROGRESS NOTES
[facility] Weekly Treatment Plan Review      ATTENDANCE for the following date span:  21-10/3/21    Date     Group Therapy 3    0 hours No group  hours 3 hours 3 hours No group    Individual Therapy        Family Therapy        Other (Specify) Phase 1 &2  Phase 3  Phase 1  Phase 1 & 2        Patient had 0 absences during this time period.      Total hours: 26/108. Patient is in phase 1.     Weekly Treatment Plan Review     Treatment Plan initiated on: 21.    Dimension1: Acute Intoxication/Withdrawal Potential -   Previous Dimension Ratin  Current Dimension Ratin  Date of Last Use: 21  Any reports of withdrawal symptoms: Yes, Patient has withdrawal symptoms if she does not take her Methadone at scheduled time  Narrative: Patient reports date of last use of opioids and marijuana to be on 21. Patient's MAT clinic increased her dosage on 10/10/21.  Patient reports use of heroin on 21, she reports no withdrawal symptoms or concerns. She reports she snorted 1 line yet bought 1/2 gram.     Dimension 2: Biomedical Conditions & Complications -   Previous Dimension Ratin  Current Dimension Ratin  Medical Concerns:  Patient has bad knees, she also is being monitored by PCP for biomedical concerns.   Current Medications & Medication Changes:  Current Outpatient Medications   Medication     escitalopram (LEXAPRO) 20 MG tablet     METFORMIN HCL PO     METHADONE HCL PO     OXcarbazepine (TRILEPTAL) 300 MG tablet     No current facility-administered medications for this visit.     Medication Prescriber:  PCP and MAT   Taking meds as prescribed? Yes  Medication side effects or concerns:  Withdrawal from Methadone if not dosed at appropriate time.   Outside medical appointments this week (list provider and reason for visit):  None  Narrative: Patient reports both knees are in poor condition. Patient identified a need for knee replacements.  She identified she is working with providers to address her concerns.  Patient identifies she struggles to slow down and take it easy on her self, we discussed self care and importance as her physical health is the trigger for her substance use. Last week patient had an appointment for her cortizone injection and reports she will be following up in the next 2 months for an additional injection on top of her regular. She did also get her glasses and is wearing them and finding benefit. No emergent concerns endorsed this last week.     Dimension 3: Emotional/Behavioral Conditions & Complications -   Previous Dimension Ratin  Current Dimension Ratin  PHQ9   No flowsheet data found.  GAD7   No flowsheet data found.  Mental health diagnosis None   Date of last SIB:  NA  Date of  last SI:  NA  Date of last HI: NA  Behavioral Targets:  Develop coping skills to manage grief and loss.   Current  Assignments:  Engage in mindfulness   Narrative:  Patient reports no mental health diagnoses. She did report experiencing significant grief and loss over the last year. Patient is not currently working with  providers however would benefit, we discussed this in a 1x1 and patient will think about it when she steps down to phase 2 and has 1 less day of treatment services. Patient discussed some stresses this week due to feeling overwhelmed with responsibilities she has regarding juggling daily tasks. The patient reported her son was ill and she had to bring him to the emergency room and that was a major stressor for her over the last week and what lead to her relapse. Patient has family stressors and patient tends to take on a caretaker role in her family system.      Dimension 4: Treatment Acceptance / Resistance -   Previous Dimension Ratin  Current Dimension Ratin  OCTAVIO Diagnosis:  304.30 (F12.20) Cannabis Use Disorder Severe     Opioid Use Disorder, Specify if:  On a maintenance therapy with a severity of:   304.00 (F11.20) Severe  Stage: 1  Commitment to tx process/Stage of change: Preparation   OCTAVIO assignments:  Initial relapse prevention plan and goodbye letter.   Narrative: Patient is an active and engaged group member. Patient was placed on a treatment contract due to continued use and failure to attend group at the scheduled time. Topic was acceptance. Patient is on alternate schedule where she leaves group at 9:00 AM and returns by 9:30 AM to bring her grandaughter to school.     Dimension 5: Relapse / Continued Problem Potential -   Previous Dimension Rating:  3  Current Dimension Rating:  3  Relapses this week: YES, List patient reports use of opioids   Urges to use:  YES, List related to pain   UA results: No results found for this or any previous visit (from the past 168 hour(s)).  Narrative: Patient has no coping skills to manage urges or triggers as evidence by her use after admittance into OP programing. She identifies her use is related to pain and lack of pain management. She has no relapse prevention plan at this time. She reports she is motivated for recovery and has a desire for abstinence so she is eligible for a knee replacement. Patient is in need of coping skills as she has very few and they are not applied. Patient has been given a relapse prevention plan to complete. Patient reports experiencing triggers and urges to use this week have been due to pain. She reports avoiding those urges by pushing through them. Patient may be at high risk for further use due to lacking in personal coping skills. Patient used related to stress over the last week. She has no coping skills to manage stressors or triggers at this time. Patient will be scheduled for more individual session to review relapse prevention planning.     Dimension 6: Recovery Environment -   Previous Dimension Ratin  Current Dimension Ratin  Family Involvement : None    Summarize attendance at family groups and family sessions:  NA  Family supportive of treatment?  Yes  Community support group attendance: None  Recreational activities: spending time with her grandchildren   Narrative: Patient has support from her family and that her son calls her daily. Patient is not attending any sober support meetings at this time however has been given a meeting list and information. 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  Patient reports no current legal concerns. Patient would benefit from additional structure and routine.      TOPIC  DATES   8 Dimensions of Wellness 9/6/21-9/10/21   Medical Aspects  9/13-9/16/21   Emotional Wellbeing/Mental Health  9/20-9/24/21   Acceptance  9/20/21-9/30/21   Relationships     Relapse Prevention     Sober Structure     Grief and Loss  8/30/21-9/2/21     Justification for Continued Treatment at this Level of Care: Patient is at high risk for continued use and relapse. Patient has pain and that leads to relapse. Patient is in need of mental health services. She would benefit from gaining insight to relapse process.     Discharge Planning:  Target Discharge Date/Timeframe:  3/1/21   Med Mgmt Provider/Appt:  Daily due to MAT    Ind therapy Provider/Appt:  NA    Family therapy Provider/Appt:  NA   Other referrals:  Patient may need referral for pain clinic, PCP     Has vulnerable adult status change? N/A    Service Coordination:  Patient is scheduled for regular 1x1 sessions.     Supervision:  Patient is staffed with treatment providers, this includes: STEPHANY York and STEPHANY Pierre.       Are Treatment Plan goals/objectives effective? Yes  *If no, list changes to treatment plan:    Are the current goals meeting client's needs? Yes  *If no, list the changes to treatment plan.    Client Input / Response: Agree      *Client agrees with any changes to the treatment plan: Yes  *Client received copy of changes: Yes  *Client is aware of right to access a treatment plan review:  Yes

## 2021-10-04 ENCOUNTER — VIRTUAL VISIT (OUTPATIENT)
Dept: ADDICTION MEDICINE | Facility: CLINIC | Age: 56
End: 2021-10-04
Payer: MEDICARE

## 2021-10-04 DIAGNOSIS — F11.20 OPIOID USE DISORDER, SEVERE, DEPENDENCE (H): Primary | ICD-10-CM

## 2021-10-04 PROCEDURE — 90853 GROUP PSYCHOTHERAPY: CPT | Mod: PO

## 2021-10-04 NOTE — GROUP NOTE
Group Therapy Documentation    PATIENT'S NAME: Roger Fallon  MRN:   [id number]  :   1965  ACCT. NUMBER: [id number]  DATE OF SERVICE: 10/04/21  START TIME:  8:30 AM  END TIME: 11:30 AM  FACILITATOR(S): Nevin Monet LADC  TOPIC: BEH Group Therapy  Number of patients attending the group:  7  Group Length:  3 Hours    Group Therapy Type: Addiction and Psychoeducation    Summary of Group / Topics Discussed:    Cognitive Therapy Techniques, Meditation/Breathing Exercises, and Relationships    Supervising MD: Dr. Garber     Telemedicine Visit: The patient's condition can be safely assessed and treated via synchronous audio and visual telemedicine encounter.      Reason for Telemedicine Visit: Services only offered telehealth    Originating Site (Patient Location): Patient's home    Distant Site (Provider Location): Provider Remote Setting- Home Office    Consent:  The patient/guardian has verbally consented to: the potential risks and benefits of telemedicine (video visit) versus in person care; bill my insurance or make self-payment for services provided; and responsibility for payment of non-covered services.     Mode of Communication:  Video Conference via [employer]    As the provider I attest to compliance with applicable laws and regulations related to telemedicine.        Group Attendance:  Attended group session    Patient's response to the group topic/interactions:  cooperative with task and discussed personal experience with topic    Patient appeared to be Actively participating, Attentive and Engaged.        Client specific details:  In group last Thursday patient received feedback from her peers on a situation with her son, patient identified in group today she took into consideration what her peers had said and had good outcomes over the weekend. Patient reports she is taking time to focus on herself and in that is reducing stress.

## 2021-10-06 ENCOUNTER — VIRTUAL VISIT (OUTPATIENT)
Dept: ADDICTION MEDICINE | Facility: CLINIC | Age: 56
End: 2021-10-06
Payer: MEDICARE

## 2021-10-06 DIAGNOSIS — F11.20 OPIOID USE DISORDER, SEVERE, DEPENDENCE (H): Primary | ICD-10-CM

## 2021-10-06 PROCEDURE — 90853 GROUP PSYCHOTHERAPY: CPT | Mod: PO

## 2021-10-06 NOTE — GROUP NOTE
Group Therapy Documentation    PATIENT'S NAME: Roger Fallon  MRN:   [id number]  :   1965  ACCT. NUMBER: [id number]  DATE OF SERVICE: 10/06/21  START TIME:  8:30 AM  END TIME: 11:30 AM  FACILITATOR(S): Nevin Monet LADC; Trista Best LADC  TOPIC: BEH Group Therapy  Number of patients attending the group:  4  Group Length:  3 Hours    Group Therapy Type: Addiction and Psychoeducation    Summary of Group / Topics Discussed:    Boundaries, Communication, Interpersonal Effectiveness, Meditation/Breathing Exercises, Mindfulness, Relationships, and Self-Esteem/Self [place]    Telemedicine Visit: The patient's condition can be safely assessed and treated via synchronous audio and visual telemedicine encounter.      Reason for Telemedicine Visit: Services only offered telehealth    Originating Site (Patient Location): Patient's home    Distant Site (Provider Location): Provider Remote Setting- Home Office    Consent:  The patient/guardian has verbally consented to: the potential risks and benefits of telemedicine (video visit) versus in person care; bill my insurance or make self-payment for services provided; and responsibility for payment of non-covered services.     Mode of Communication:  Video Conference via [employer]    As the provider I attest to compliance with applicable laws and regulations related to telemedicine.   Supervising MD: Dr. Garber        Group Attendance:  Attended group session  Patient attended 2 hours of SRP group session.    Patient's response to the group topic/interactions:  cooperative with task, discussed personal experience with topic, expressed readiness to alter behaviors, expressed understanding of topic, gave appropriate feedback to peers and listened actively    Patient appeared to be Actively participating, Attentive and Engaged.        Client specific details:  Patient discussed making a decision to delete all dealers phone numbers. She described she is  avoiding toxic people, places and things that have hindered her ability to  stay sober. She identifies one recent incident where she informed someone not to call her anymore.

## 2021-10-07 ENCOUNTER — VIRTUAL VISIT (OUTPATIENT)
Dept: ADDICTION MEDICINE | Facility: CLINIC | Age: 56
End: 2021-10-07
Payer: MEDICARE

## 2021-10-07 DIAGNOSIS — F11.20 OPIOID USE DISORDER, SEVERE, DEPENDENCE (H): Primary | ICD-10-CM

## 2021-10-07 PROCEDURE — 90853 GROUP PSYCHOTHERAPY: CPT | Mod: GT

## 2021-10-07 NOTE — GROUP NOTE
Group Therapy Documentation    PATIENT'S NAME: Roger Fallon  MRN:   [id number]  :   1965  ACCT. NUMBER: [id number]  DATE OF SERVICE: 10/07/21  START TIME:  8:30 AM  END TIME: 11:30 AM  FACILITATOR(S): Nevin Monet LADC; Trista Best LADC  TOPIC: BEH Group Therapy  Number of patients attending the group:  6  Group Length:  3 Hours    Group Therapy Type: Addiction and Psychoeducation    Summary of Group / Topics Discussed:    Cognitive Therapy Techniques, Mindfulness, and Relationships    Supervising MD: Dr. Garber     Telemedicine Visit: The patient's condition can be safely assessed and treated via synchronous audio and visual telemedicine encounter.      Reason for Telemedicine Visit: Services only offered telehealth    Originating Site (Patient Location): Patient's home    Distant Site (Provider Location): Provider Remote Setting- Home Office    Consent:  The patient/guardian has verbally consented to: the potential risks and benefits of telemedicine (video visit) versus in person care; bill my insurance or make self-payment for services provided; and responsibility for payment of non-covered services.     Mode of Communication:  Video Conference via [employer]    As the provider I attest to compliance with applicable laws and regulations related to telemedicine.      Group Attendance:  Attended group session    Patient's response to the group topic/interactions:  cooperative with task and discussed personal experience with topic    Patient appeared to be Actively participating, Attentive and Engaged.        Client specific details:  Patient reports she is working on setting boundaries and limits with her family so she can focus more time on self care.

## 2021-10-08 ENCOUNTER — DOCUMENTATION ONLY (OUTPATIENT)
Dept: ADDICTION MEDICINE | Facility: CLINIC | Age: 56
End: 2021-10-08

## 2021-10-08 NOTE — PROGRESS NOTES
[facility] Weekly Treatment Plan Review      ATTENDANCE for the following date span:  10/4/21-10/10/21    Date     Group Therapy 3    0 hours No group  hours 3 hours 3 hours No group    Individual Therapy        Family Therapy        Other (Specify) Phase 1 &2  Phase 3  Phase 1  Phase 1 & 2        Patient had 0 absences during this time period.      Total hours: 34/108. Patient is in phase 1.     Weekly Treatment Plan Review     Treatment Plan initiated on: 21.    Dimension1: Acute Intoxication/Withdrawal Potential -   Previous Dimension Ratin  Current Dimension Ratin  Date of Last Use: 21  Any reports of withdrawal symptoms: Yes, Patient has withdrawal symptoms if she does not take her Methadone at scheduled time  Narrative: Patient reports date of last use of opioids and marijuana to be on 21. Patient's MAT clinic increased her dosage on 10/10/21.  Patient reports use of heroin on 21, she reports no withdrawal symptoms or concerns.  No use endorsed over the last week.     Dimension 2: Biomedical Conditions & Complications -   Previous Dimension Ratin  Current Dimension Ratin  Medical Concerns:  Patient has bad knees, she also is being monitored by PCP for biomedical concerns.   Current Medications & Medication Changes:  Current Outpatient Medications   Medication     escitalopram (LEXAPRO) 20 MG tablet     METFORMIN HCL PO     METHADONE HCL PO     OXcarbazepine (TRILEPTAL) 300 MG tablet     No current facility-administered medications for this visit.     Medication Prescriber:  PCP and MAT   Taking meds as prescribed? Yes  Medication side effects or concerns:  Withdrawal from Methadone if not dosed at appropriate time.   Outside medical appointments this week (list provider and reason for visit):  None  Narrative: Patient reports both knees are in poor condition. Patient identified a need for knee replacements. She identified  she is working with providers to address her concerns.  Patient identifies she struggles to slow down and take it easy on her self, we discussed self care and importance as her physical health is the trigger for her substance use. Patient had acortizone injection and reports she will be following up in the next 2 months for an additional injection on top of her regular. She did also get her glasses and is wearing them and finding benefit. No emergent concerns endorsed this last week.     Dimension 3: Emotional/Behavioral Conditions & Complications -   Previous Dimension Ratin  Current Dimension Ratin  PHQ9   No flowsheet data found.  GAD7   No flowsheet data found.  Mental health diagnosis None   Date of last SIB:  NA  Date of  last SI:  NA  Date of last HI: NA  Behavioral Targets:  Develop coping skills to manage grief and loss.   Current MH Assignments:  Engage in mindfulness   Narrative:  Patient reports no mental health diagnoses. She did report experiencing significant grief and loss over the last year. Patient is not currently working with MH providers however would benefit, we discussed this in a 1x1 and patient will think about it when she steps down to phase 2 and has 1 less day of treatment services. Patient is working on developing healthy boundaries with her family so she can focus time on self care and her mental health. She believes this will be helpful for her overall to work on herself more and be less of a caretaker.     Dimension 4: Treatment Acceptance / Resistance -   Previous Dimension Ratin  Current Dimension Ratin  OCTAVIO Diagnosis:  304.30 (F12.20) Cannabis Use Disorder Severe     Opioid Use Disorder, Specify if:  On a maintenance therapy with a severity of:  304.00 (F11.20) Severe  Stage: 1  Commitment to tx process/Stage of change: Preparation   OCTAIVO assignments:  Initial relapse prevention plan and goodbye letter.   Narrative: Patient is an active and engaged group member.  Patient was placed on a treatment contract due to continued use and failure to attend group at the scheduled time. Topic was relationships. Patient is on alternate schedule where she leaves group at 9:00 AM and returns by 9:30 AM to bring her grandauenoch to school.     Dimension 5: Relapse / Continued Problem Potential -   Previous Dimension Rating:  3  Current Dimension Rating:  3  Relapses this week: None  Urges to use:  None  UA results: No results found for this or any previous visit (from the past 168 hour(s)).  Narrative: Patient has minimal coping skills to manage urges or triggers as evidence by her use after admittance into OP programing. She identifies her use is related to pain and lack of pain management. She has no relapse prevention plan at this time yet has been given an assignment for a relapse prevention plan. She reports she is motivated for recovery and has a desire for abstinence so she is eligible for a knee replacement. Patient is in need of coping skills as she has very few and they are not applied. Patient may be at high risk for further use due to lacking in personal coping skills.     Dimension 6: Recovery Environment -   Previous Dimension Ratin  Current Dimension Ratin  Family Involvement : None    Summarize attendance at family groups and family sessions: NA  Family supportive of treatment?  Yes  Community support group attendance: None  Recreational activities: spending time with her grandchildren   Narrative: Patient has support from her family and that her son calls her daily. Patient is not attending any sober support meetings at this time however has been given a meeting list and information. 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  Patient reports no current legal concerns. Patient would benefit from additional structure and routine. She had time this week to focus on herself and found benefit in that. She has been working on a goal to  organize her home.     TOPIC  DATES   8 Dimensions of Wellness 9/6/21-9/10/21   Medical Aspects  9/13-9/16/21   Emotional Wellbeing/Mental Health  9/20-9/24/21   Acceptance  9/20/21-9/30/21   Relationships  10/4/21-10/8/21   Relapse Prevention     Sober Structure     Grief and Loss  8/30/21-9/2/21     Justification for Continued Treatment at this Level of Care: Patient is at high risk for continued use and relapse. Patient has pain and that leads to relapse. Patient is in need of mental health services. She would benefit from gaining insight to relapse process.     Discharge Planning:  Target Discharge Date/Timeframe:  3/1/21   Med Mgmt Provider/Appt:  Daily due to MAT    Ind therapy Provider/Appt:  NA    Family therapy Provider/Appt:  NA   Other referrals:  Patient may need referral for pain clinic, PCP     Has vulnerable adult status change? N/A    Service Coordination:  Patient is scheduled for regular 1x1 sessions.     Supervision:  Patient is staffed with treatment providers, this includes: STEPHANY York and STEPHANY Pierre.       Are Treatment Plan goals/objectives effective? Yes  *If no, list changes to treatment plan:    Are the current goals meeting client's needs? Yes  *If no, list the changes to treatment plan.    Client Input / Response: Agree      *Client agrees with any changes to the treatment plan: Yes  *Client received copy of changes: Yes  *Client is aware of right to access a treatment plan review: Yes

## 2021-10-11 ENCOUNTER — VIRTUAL VISIT (OUTPATIENT)
Dept: ADDICTION MEDICINE | Facility: CLINIC | Age: 56
End: 2021-10-11
Payer: MEDICARE

## 2021-10-11 DIAGNOSIS — F11.20 OPIOID USE DISORDER, SEVERE, DEPENDENCE (H): Primary | ICD-10-CM

## 2021-10-11 PROCEDURE — 90853 GROUP PSYCHOTHERAPY: CPT | Mod: GT

## 2021-10-11 NOTE — GROUP NOTE
Group Therapy Documentation    PATIENT'S NAME: Roger Fallon  MRN:   [id number]  :   1965  ACCT. NUMBER: [id number]  DATE OF SERVICE: 10/11/21  START TIME:  8:30 AM  END TIME: 11:30 AM  FACILITATOR(S): Nevin Monet LADC; Trista Best LADC  TOPIC: BEH Group Therapy  Number of patients attending the group:  7  Group Length:  3 Hours    Group Therapy Type: Addiction and Psychoeducation    Summary of Group / Topics Discussed:    Balanced Lifestyle , Coping Skills/Lifestyle Managemet, Choices in Recovery, Interpersonal Effectiveness, Leisure Exploration/Use of Leisure Time, Mindfulness, and Self-Care Activities    Telemedicine Visit: The patient's condition can be safely assessed and treated via synchronous audio and visual telemedicine encounter.      Reason for Telemedicine Visit: Services only offered telehealth    Originating Site (Patient Location): Patient's home    Distant Site (Provider Location): Provider Remote Setting- Home Office    Consent:  The patient/guardian has verbally consented to: the potential risks and benefits of telemedicine (video visit) versus in person care; bill my insurance or make self-payment for services provided; and responsibility for payment of non-covered services.     Mode of Communication:  Video Conference via [employer]    As the provider I attest to compliance with applicable laws and regulations related to telemedicine.     Supervising MD: Dr. Garber        Group Attendance:  Attended group session    Patient's response to the group topic/interactions:  cooperative with task, discussed personal experience with topic, expressed readiness to alter behaviors, expressed understanding of topic, gave appropriate feedback to peers and listened actively    Patient appeared to be Actively participating, Attentive and Engaged.       Client specific details:  Patient discussed her weekend was spent quietly at home watching movies and household tasks, also  working on her Goodbye Letter to her DOC homework assignment while caring for her granddaughter. She describes that currently sober structure for her means that she will return to hobbies and other activities that she enjoys and will benefit her in focusing more on her personal recovery plan.

## 2021-10-13 ENCOUNTER — VIRTUAL VISIT (OUTPATIENT)
Dept: ADDICTION MEDICINE | Facility: CLINIC | Age: 56
End: 2021-10-13
Payer: MEDICARE

## 2021-10-13 DIAGNOSIS — F11.20 OPIOID USE DISORDER, SEVERE, DEPENDENCE (H): Primary | ICD-10-CM

## 2021-10-13 PROCEDURE — 90853 GROUP PSYCHOTHERAPY: CPT | Mod: PO

## 2021-10-13 NOTE — GROUP NOTE
Group Therapy Documentation    PATIENT'S NAME: Roger Fallon  MRN:   [id number]  :   1965  ACCT. NUMBER: [id number]  DATE OF SERVICE: 10/13/21  START TIME:  8:30 AM  END TIME: 11:30 AM  FACILITATOR(S): Trista Best LADC  TOPIC: BEH Group Therapy  Number of patients attending the group:  *4**  Group Length:  3 Hours    Group Therapy Type: Addiction and Psychoeducation    Summary of Group / Topics Discussed:    Balanced Lifestyle , Boredom, Boundaries, Cognitive Therapy Techniques, Coping Skills/Lifestyle Managemet, Choices in Recovery, Leisure Exploration/Use of Leisure Time, Meditation/Breathing Exercises, Mindfulness, Proper Nutrition & Exercise, Self-Care Activities, Self-Esteem/Self [place], and Time Management    Telemedicine Visit: The patient's condition can be safely assessed and treated via synchronous audio and visual telemedicine encounter.      Reason for Telemedicine Visit: Services only offered telehealth    Originating Site (Patient Location): Patient's home    Distant Site (Provider Location): Provider Remote Setting- Home Office    Consent:  The patient/guardian has verbally consented to: the potential risks and benefits of telemedicine (video visit) versus in person care; bill my insurance or make self-payment for services provided; and responsibility for payment of non-covered services.   Supervising MD: Dr. Garber      Mode of Communication:  Video Conference via [employer]    As the provider I attest to compliance with applicable laws and regulations related to telemedicine.       Group Attendance:  Attended group session    Patient's response to the group topic/interactions:  cooperative with task, discussed personal experience with topic, expressed readiness to alter behaviors, expressed understanding of topic, gave appropriate feedback to peers and listened actively    Patient appeared to be Actively participating, Attentive and Engaged.        Client specific details:   Patient discussed her motivation for learning skills for ongoing recovery is her health, children and grandchildren. She identifies that she is currently working on her Goodbye Letter to her DOC. Patient participated in topic discussion on Why Structure and Routine is Important to Recovery.

## 2021-10-14 ENCOUNTER — VIRTUAL VISIT (OUTPATIENT)
Dept: ADDICTION MEDICINE | Facility: CLINIC | Age: 56
End: 2021-10-14
Payer: MEDICARE

## 2021-10-14 PROCEDURE — 90853 GROUP PSYCHOTHERAPY: CPT | Mod: PO

## 2021-10-14 NOTE — GROUP NOTE
Group Therapy Documentation    PATIENT'S NAME: Roger Fallon  MRN:   [id number]  :   1965  ACCT. NUMBER: [id number]  DATE OF SERVICE: 10/14/21  START TIME:  8:30 AM  END TIME: 11:30 AM  FACILITATOR(S): Nevin Monet LADC; Trista Best LADC  TOPIC: BEH Group Therapy  Number of patients attending the group:  3  Group Length:  3 Hours    Group Therapy Type: Addiction and Psychoeducation    Summary of Group / Topics Discussed:    Boredom, Boundaries, Cognitive Therapy Techniques, Choices in Recovery, Leisure Exploration/Use of Leisure Time, Meditation/Breathing Exercises, Mindfulness, Proper Nutrition & Exercise, Relaxation Techniques, Self-Care Activities, and Time Management Sober Structure    Telemedicine Visit: The patient's condition can be safely assessed and treated via synchronous audio and visual telemedicine encounter.      Reason for Telemedicine Visit: Services only offered telehealth    Originating Site (Patient Location): Patient's home    Distant Site (Provider Location): Provider Remote Setting- Home Office    Consent:  The patient/guardian has verbally consented to: the potential risks and benefits of telemedicine (video visit) versus in person care; bill my insurance or make self-payment for services provided; and responsibility for payment of non-covered services.     Mode of Communication:  Video Conference via [employer]    As the provider I attest to compliance with applicable laws and regulations related to telemedicine.     Supervising MD: Dr. Garber        Group Attendance:  Attended group session   Patient attended 2 hours of group session     Patient's response to the group topic/interactions:  cooperative with task, discussed personal experience with topic, expressed readiness to alter behaviors, expressed understanding of topic, gave appropriate feedback to peers and listened actively    Patient appeared to be Actively participating, Attentive and Engaged.         Client specific details:  Patient discussed meeting her goal this week of hanging her mother's plaques on the wall and plans to continue working on her Goodbye Letter to her DOC homework assignment over the weekend. She described that the areas of her recovery that need more focus is to continue staying away from using people. Patient states that she intends to continue pushing forward in recovery.

## 2021-10-15 ENCOUNTER — DOCUMENTATION ONLY (OUTPATIENT)
Dept: ADDICTION MEDICINE | Facility: CLINIC | Age: 56
End: 2021-10-15

## 2021-10-15 NOTE — LETTER
2017       RE: Roger Fallon   [address]  [place] [address]     Dear Colleague,    Thank you for referring your patient, Roger Fallon, to the [facility] NEUROLOGY at [facility]. Please see a copy of my visit note below.    2017             Eric Lange MD   [facility]    [address] [place] [address]      RE: Roger Fallon   MRN: [id number]   : 1965      Dear Dr. Lange:      This woman is 51 and is said to have a history of seizures, but has not had one for 3 years or more.  She says her father used to have them, but she cannot describe.  She said her seizures started at age 30 and have been characterized by speech arrest and profound sweating.  I have seen her a couple of times; the last time was in October of last year, and we noticed her MRI had a left subfrontal lesion, and so she is due for a repeat one.  This was not an enhancing lesion, and it was on the T2 FLAIR signal.  Apparently, the grandfather also had seizures curiously when he laughed.  She has no history of head injury, meningitis or encephalitis as a possible etiology for her seizures, which seem to be partial.  Her neurological exam has been normal.  She returns today and reports no episodes in the past.  She is using OxyContin and morphine for chronic back pain.  She is disabled because of that.        PHYSICAL EXAMINATION:  Small pupils as expected.  Fundi look normal.  Normal extraocular movements.  Facial motion and facial sensation are normal.  Neck is supple.  Good coordination.  Her arms are strong, and reflexes are equal.  She has absent ankle.  She has diabetes.      In summary, she seems to be seizure free.  The seizures have not been well characterized.  Her last EEG was done here in  in May, and actually it was ordered, but she never followed through.  She will need admission for monitoring and classification of her  Order clarification sent episodes, which are not clear.  The MRI has shown a frontal lesion in the left in .  We will repeat the MRI with contrast and see if that is static or not.  I suspect it probably will be.      Her Trileptal level has been very low using 600-900 at 9.5.  We stressed compliance.  We might raise the dose, but we will recheck the concentration today.  We will work on admitting her in the near future.      Sincerely,      Roney Mora MD      D: 2017 11:51   T: 2017 07:11   MT: [email or web address]      Name:     BETTY BOWEN   MRN:      [id number]        Account:      [id number]   :      1965           Service Date: 2017      Document: [id number]

## 2021-10-15 NOTE — PROGRESS NOTES
[facility] Weekly Treatment Plan Review      ATTENDANCE for the following date span:  10/11/21-10/17/21    Date     Group Therapy 3    0 hours No group  hours 3 hours 3 hours No group    Individual Therapy        Family Therapy        Other (Specify) Phase 1 &2  Phase 3  Phase 1  Phase 1 & 2        Patient had 0 absences during this time period.      Total hours: 42/108. Patient is in phase 1.     Weekly Treatment Plan Review     Treatment Plan initiated on: 21.    Dimension1: Acute Intoxication/Withdrawal Potential -   Previous Dimension Ratin  Current Dimension Ratin  Date of Last Use: 21  Any reports of withdrawal symptoms: Yes, Patient has withdrawal symptoms if she does not take her Methadone at scheduled time  Narrative: Patient reports date of last use of opioids and marijuana to be on 21. Patient's MAT clinic increased her dosage on 10/10/21.  Patient reports use of heroin on 21, she reports no withdrawal symptoms or concerns.  No use endorsed over the last week.     Dimension 2: Biomedical Conditions & Complications -   Previous Dimension Ratin  Current Dimension Ratin  Medical Concerns:  Patient has bad knees, she also is being monitored by PCP for biomedical concerns.   Current Medications & Medication Changes:  Current Outpatient Medications   Medication     escitalopram (LEXAPRO) 20 MG tablet     METFORMIN HCL PO     METHADONE HCL PO     OXcarbazepine (TRILEPTAL) 300 MG tablet     No current facility-administered medications for this visit.     Medication Prescriber:  PCP and MAT   Taking meds as prescribed? Yes  Medication side effects or concerns:  Withdrawal from Methadone if not dosed at appropriate time.   Outside medical appointments this week (list provider and reason for visit):  None  Narrative: Patient reports both knees are in poor condition. Patient identified a need for knee replacements. She identified  she is working with providers to address her concerns.  Patient identifies she struggles to slow down and take it easy on her self, we discussed self care and importance as her physical health is the trigger for her substance use. Patient had acortizone injection and reports she will be following up in the next 2 months for an additional injection on top of her regular. She did also get her glasses and is wearing them and finding benefit. No emergent concerns endorsed this last week.     Dimension 3: Emotional/Behavioral Conditions & Complications -   Previous Dimension Ratin  Current Dimension Ratin  PHQ9   No flowsheet data found.  GAD7   No flowsheet data found.  Mental health diagnosis None   Date of last SIB:  NA  Date of  last SI:  NA  Date of last HI: NA  Behavioral Targets:  Develop coping skills to manage grief and loss.   Current MH Assignments:  Engage in mindfulness   Narrative:  Patient reports no mental health diagnoses. She did report experiencing significant grief and loss over the last year. Patient is not currently working with MH providers however would benefit, we discussed this in a 1x1 and patient will think about it when she steps down to phase 2 and has 1 less day of treatment services. Patient is working on developing healthy boundaries with her family so she can focus time on self care and her mental health. She believes this will be helpful for her overall to work on herself more and be less of a caretaker. No new or emergent concerns endorsed over the last week.     Dimension 4: Treatment Acceptance / Resistance -   Previous Dimension Ratin  Current Dimension Ratin  OCTAVIO Diagnosis:  304.30 (F12.20) Cannabis Use Disorder Severe     Opioid Use Disorder, Specify if:  On a maintenance therapy with a severity of:  304.00 (F11.20) Severe  Stage: 1  Commitment to tx process/Stage of change: Contemplation  OCTAVIO assignments:  Initial relapse prevention plan and goodbye letter.    Narrative: Patient is an active and engaged group member. Patient was placed on a treatment contract due to continued use and failure to attend group at the scheduled time. Topic was sober structure. Patient is on alternate schedule where she leaves group at 9:00 AM and returns by 9:30 AM to bring her ryland to school. Patient is working on her goodbye letter.     Dimension 5: Relapse / Continued Problem Potential -   Previous Dimension Rating:  3  Current Dimension Rating:  3  Relapses this week: None  Urges to use:  None  UA results: No results found for this or any previous visit (from the past 168 hour(s)).  Narrative: Patient has minimal coping skills to manage urges or triggers as evidence by her use after admittance into OP programing. She identifies her use is related to pain and lack of pain management. She has no relapse prevention plan at this time yet has been given an assignment for a relapse prevention plan. She reports she is motivated for recovery and has a desire for abstinence so she is eligible for a knee replacement. Patient is in need of coping skills as she has very few and they are not applied. Patient may be at high risk for further use due to lacking in personal coping skills. Patient identified she is working on staying away from people who are historically triggering for her. She identified she has been using skills to avoid peers who could be triggering.     Dimension 6: Recovery Environment -   Previous Dimension Ratin  Current Dimension Ratin  Family Involvement : None    Summarize attendance at family groups and family sessions: NA  Family supportive of treatment?  Yes  Community support group attendance: None  Recreational activities: spending time with her grandchildren   Narrative: Patient has support from her family and that her son calls her daily. Patient is not attending any sober support meetings at this time however has been given a meeting list and  information. 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  Patient reports no current legal concerns. Patient would benefit from additional structure and routine. She had time this week to focus on herself and found benefit in that. She has been working on a goal to organize her home. She identified she is going to go to the movies this weekend for self care.     TOPIC  DATES   8 Dimensions of Wellness 9/6/21-9/10/21   Medical Aspects  9/13-9/16/21   Emotional Wellbeing/Mental Health  9/20-9/24/21   Acceptance  9/20/21-9/30/21   Relationships  10/4/21-10/8/21   Relapse Prevention     Sober Structure  10/11/21-10/15/21   Grief and Loss  8/30/21-9/2/21     Justification for Continued Treatment at this Level of Care: Patient is at high risk for continued use and relapse. Patient has pain and that leads to relapse. Patient is in need of mental health services. She would benefit from gaining insight to relapse process.     Discharge Planning:  Target Discharge Date/Timeframe:  3/1/21   Med Mgmt Provider/Appt:  Daily due to MAT    Ind therapy Provider/Appt:  NA    Family therapy Provider/Appt:  NA   Other referrals:  Patient may need referral for pain clinic, PCP     Has vulnerable adult status change? N/A    Service Coordination:  Patient is scheduled for regular 1x1 sessions.     Supervision:  Patient is staffed with treatment providers, this includes: STEPHANY York and STEPHANY Pierre.       Are Treatment Plan goals/objectives effective? Yes  *If no, list changes to treatment plan:    Are the current goals meeting client's needs? Yes  *If no, list the changes to treatment plan.    Client Input / Response: Agree      *Client agrees with any changes to the treatment plan: Yes  *Client received copy of changes: Yes  *Client is aware of right to access a treatment plan review: Yes

## 2021-10-18 ENCOUNTER — VIRTUAL VISIT (OUTPATIENT)
Dept: ADDICTION MEDICINE | Facility: CLINIC | Age: 56
End: 2021-10-18
Payer: MEDICARE

## 2021-10-18 DIAGNOSIS — F11.20 OPIOID USE DISORDER, SEVERE, DEPENDENCE (H): Primary | ICD-10-CM

## 2021-10-18 PROCEDURE — 90853 GROUP PSYCHOTHERAPY: CPT | Mod: PO

## 2021-10-18 NOTE — GROUP NOTE
Group Therapy Documentation    PATIENT'S NAME: Roger Fallon  MRN:   [id number]  :   1965  ACCT. NUMBER: [id number]  DATE OF SERVICE: 10/18/21  START TIME:  8:30 AM  END TIME: 11:30 AM  FACILITATOR(S): Nevin Monet LADC; Trista Best LADC  TOPIC: BEH Group Therapy  Number of patients attending the group:  5  Group Length:  3 Hours    Group Therapy Type: Addiction and Psychoeducation    Summary of Group / Topics Discussed:    Cognitive Therapy Techniques, Meditation/Breathing Exercises, and relapse prevention.     Supervising MD: Dr. Garber     Telemedicine Visit: The patient's condition can be safely assessed and treated via synchronous audio and visual telemedicine encounter.      Reason for Telemedicine Visit: Services only offered telehealth    Originating Site (Patient Location): Patient's home    Distant Site (Provider Location): Provider Remote Setting- Home Office    Consent:  The patient/guardian has verbally consented to: the potential risks and benefits of telemedicine (video visit) versus in person care; bill my insurance or make self-payment for services provided; and responsibility for payment of non-covered services.     Mode of Communication:  Video Conference via [employer]    As the provider I attest to compliance with applicable laws and regulations related to telemedicine.        Group Attendance:  Attended group session    Patient's response to the group topic/interactions:  cooperative with task    Patient appeared to be Actively participating, Attentive and Engaged.        Client specific details:  Patient reported her back was hurting today however reports it was not causing any triggers for her. She reports she was going to focus on rest and recovery today.

## 2021-10-19 ENCOUNTER — TELEPHONE (OUTPATIENT)
Dept: ADDICTION MEDICINE | Facility: CLINIC | Age: 56
End: 2021-10-19

## 2021-10-20 ENCOUNTER — DOCUMENTATION ONLY (OUTPATIENT)
Dept: ADDICTION MEDICINE | Facility: CLINIC | Age: 56
End: 2021-10-20

## 2021-10-20 ENCOUNTER — VIRTUAL VISIT (OUTPATIENT)
Dept: ADDICTION MEDICINE | Facility: CLINIC | Age: 56
End: 2021-10-20
Payer: MEDICARE

## 2021-10-20 DIAGNOSIS — F11.20 OPIOID USE DISORDER, SEVERE, DEPENDENCE (H): Primary | ICD-10-CM

## 2021-10-20 PROCEDURE — 90853 GROUP PSYCHOTHERAPY: CPT | Mod: PO

## 2021-10-20 PROCEDURE — H2035 A/D TX PROGRAM, PER HOUR: HCPCS | Mod: GT

## 2021-10-20 NOTE — GROUP NOTE
Group Therapy Documentation    PATIENT'S NAME: Roger Fallon  MRN:   [id number]  :   1965  ACCT. NUMBER: [id number]  DATE OF SERVICE: 10/20/21  START TIME:  8:30 AM  END TIME: 11:30 AM  FACILITATOR(S): Nevin Monet LADC; Trista Best LADC  TOPIC: BEH Group Therapy  Number of patients attending the group:  3  Group Length:  3 Hours    Group Therapy Type: Addiction and Psychoeducation    Summary of Group / Topics Discussed:    Boundaries, Cognitive Therapy Techniques, Coping Skills/Lifestyle Managemet, Choices in Recovery, Interpersonal Effectiveness, Meditation/Breathing Exercises, Mindfulness, Self-Care Activities, and Thinking Errors/Negative Self-Talk    Telemedicine Visit: The patient's condition can be safely assessed and treated via synchronous audio and visual telemedicine encounter.      Reason for Telemedicine Visit: Services only offered telehealth    Originating Site (Patient Location): Patient's home    Distant Site (Provider Location): Provider Remote Setting- Home Office    Consent:  The patient/guardian has verbally consented to: the potential risks and benefits of telemedicine (video visit) versus in person care; bill my insurance or make self-payment for services provided; and responsibility for payment of non-covered services.     Mode of Communication:  Video Conference via [employer]    As the provider I attest to compliance with applicable laws and regulations related to telemedicine.     Supervising MD: Dr. Garber        Group Attendance:  Attended group session    Patient's response to the group topic/interactions:  cooperative with task, discussed personal experience with topic, expressed readiness to alter behaviors, expressed understanding of topic, gave appropriate feedback to peers and listened actively    Patient appeared to be Actively participating, Attentive and Engaged.        Client specific details:  Patient participated in group discussion on the Voice  of Addiction, [place] video and Lapse vs. Relapse. She continues to report physical pain as being a trigger and has been able to use coping skills to avoid urges to use. She describes her reason for choosing recovery is a desire to continue having clarity in her daily living and her offspring's are her incentive to continue working on her recovery. Patient met with counselors for 1X1 check-in session following group.

## 2021-10-20 NOTE — PROGRESS NOTES
"START TIME: 11:34 AM   END TIME: 12:05 PM   Duration: 31 Minutes    Roger Fallon is a 56 year old female who is being evaluated via a billable video visit.      Telemedicine Visit: The patient's condition can be safely assessed and treated via synchronous audio and visual telemedicine encounter.      Reason for Telemedicine Visit: Service only offered via tele-medicine.    Originating Site (Patient Location): Patient's home    Distant Site (Provider Location): Provider Remote Setting - Home Office    Consent:  The patient/guardian has verbally consented to: the potential risks and benefits of telemedicine (video visit) versus in person care; billing of their insurance or make self-payment for services provided; and responsibility for payment of non-covered services.     Mode of Communication:  Video Conference via [employer].  Session Successfully completed: Yes  If session was not successful what alternative options were discussed: NA - Successful    As the provider I attest to compliance with applicable laws and regulations related to telemedicine.     Data:  Met with client on this date for an individual session. The session focused on client's treatment plan goal for DIM(s) 1, 3, 5, 6 of her individual treatment plan.     Intervention: Patient met with counselors Nevin Monet and Trista Best for 1X1 check-in to discuss any concerns she may have, also progress while attending SRP treatment. Discussed patient's last use of heroin as being on 9/23/21, and if she has had any struggles or challenges to ability to abstain from using. Patient continues to identify physical pain in her knees and back as a source of triggers, however since her MAT dose was increased on 10/10/21, she has been able to manage pain on her own using coping skills she is learning in SRP. She also reports that she begins to experience methadone withdrawal identified as \"sweats\" during early morning hours, around 23 hours " after last dosing. She reports she is able to manage those withdrawals until she is able to receive her next scheduled MAT. Counselor informed her that her counselor Cee burns [place] has been updated on the positive progress she appears to be making while in SRP. Counselors discussed treatment expectations regarding necessity  to maintain a minimum of 30 days abstinence before she will be appropriate to transfer to phase 2 of SRP. Counselors and patient discussed the positive progress she continues to exhibit in her group participation, consistant  attendance, also appeared desire to be educated on all aspects addiction. Patient confirms that she has been willing to make certain lifestyle changes to support her sobriety. Patient reports that she is enjoying recieving the education and peer interactions experience of SRP.     Assessment: No concerns at this time. Patient is engaged in psychoeducation, discussions and processing during group sessions. Patient reports having a desire for ongoing sobriety and to learn coping skills to strengthen those efforts. Patient is not yet ready to commit to MH therapy, however acknowledges that she is considering it for the near future. Patient verbalizes an understanding that having stable support beyond family, two close friends and SRP would be beneficial to ongoing recovery. Patient requested that counselor send her the [facility] Peer Specialist Support link.       Plan. Client will continue to abstain from using any substances that are not prescribed to her for reasons intended. Patient will continue working on her Goodbye to DOC Letter and Initial Relapse Prevention Plan, and all other assignments given to her by counselors. Patient will reach out to counselors regarding any concerns she may not feel comfortable sharing during group sessions.     I have reviewed the note as documented above.  This accurately captures the substance of my telehealth visit with the patient.      STEPHANY Ocampo

## 2021-10-21 ENCOUNTER — VIRTUAL VISIT (OUTPATIENT)
Dept: ADDICTION MEDICINE | Facility: CLINIC | Age: 56
End: 2021-10-21
Payer: MEDICARE

## 2021-10-21 DIAGNOSIS — F11.20 OPIOID USE DISORDER, SEVERE, DEPENDENCE (H): Primary | ICD-10-CM

## 2021-10-21 PROCEDURE — 90853 GROUP PSYCHOTHERAPY: CPT | Mod: GT

## 2021-10-21 NOTE — GROUP NOTE
Group Therapy Documentation    PATIENT'S NAME: Roger Fallon  MRN:   [id number]  :   1965  ACCT. NUMBER: [id number]  DATE OF SERVICE: 10/21/21  START TIME:  8:30 AM  END TIME: 11:30 AM  FACILITATOR(S): Nevin Monet [facility]; Trista Best LADC  TOPIC: BEH Group Therapy  Number of patients attending the group:  5  Group Length:  3 Hours    Group Therapy Type: Addiction and Psychoeducation    Summary of Group / Topics Discussed:    Balanced Lifestyle , Cognitive Therapy Techniques, Coping Skills/Lifestyle Managemet, Choices in Recovery, Interpersonal Effectiveness, Meditation/Breathing Exercises, Mindfulness, Proper Nutrition & Exercise, Reviewed Previous Skills Group Topic (Relapse Prevention), Self-Care Activities, Self-Esteem/Self [place], and Symptom Management  Telemedicine Visit: The patient's condition can be safely assessed and treated via synchronous audio and visual telemedicine encounter.      Reason for Telemedicine Visit: Services only offered telehealth    Originating Site (Patient Location): Patient's home    Distant Site (Provider Location): Provider Remote Setting- Home Office    Consent:  The patient/guardian has verbally consented to: the potential risks and benefits of telemedicine (video visit) versus in person care; bill my insurance or make self-payment for services provided; and responsibility for payment of non-covered services.     Mode of Communication:  Video Conference via [employer]    As the provider I attest to compliance with applicable laws and regulations related to telemedicine.   Supervising MD: Dr. Garber        Group Attendance:  Attended group session    Patient's response to the group topic/interactions:  cooperative with task, discussed personal experience with topic, expressed readiness to alter behaviors, expressed understanding of topic and gave appropriate feedback to peers    Patient appeared to be Actively participating, Attentive and Engaged.         Client specific details:  Patient identified the coping skills she is practicing to maintain abstinence and avoid relapsing is staying active and staying away from old using friends and acquaintances. She participated in educational topic on relapse prevention with questions and feedback.

## 2021-10-22 ENCOUNTER — DOCUMENTATION ONLY (OUTPATIENT)
Dept: ADDICTION MEDICINE | Facility: CLINIC | Age: 56
End: 2021-10-22

## 2021-10-22 NOTE — PROGRESS NOTES
[facility] Weekly Treatment Plan Review      ATTENDANCE for the following date span:  10/18/21-10/24/21    Date     Group Therapy 3    0 hours No group  hours 3 hours 3 hours No group    Individual Therapy        Family Therapy        Other (Specify) Phase 1 &2  Phase 3  Phase 1  Phase 1 & 2        Patient had 0 absences during this time period.      Total hours: 52/108. Patient is in phase 1.     Weekly Treatment Plan Review     Treatment Plan initiated on: 21.    Dimension1: Acute Intoxication/Withdrawal Potential -   Previous Dimension Ratin  Current Dimension Ratin  Date of Last Use: 21  Any reports of withdrawal symptoms: Yes, Patient has withdrawal symptoms if she does not take her Methadone at scheduled time  Narrative: Patient reports date of last use of opioids and marijuana to be on 21. Patient's MAT clinic increased her dosage on 10/10/21.  Patient reports use of heroin on 21, she reports no withdrawal symptoms or concerns.  No use endorsed over the last week.     Dimension 2: Biomedical Conditions & Complications -   Previous Dimension Ratin  Current Dimension Ratin  Medical Concerns:  Patient has bad knees, she also is being monitored by PCP for biomedical concerns.   Current Medications & Medication Changes:  Current Outpatient Medications   Medication     escitalopram (LEXAPRO) 20 MG tablet     METFORMIN HCL PO     METHADONE HCL PO     OXcarbazepine (TRILEPTAL) 300 MG tablet     No current facility-administered medications for this visit.     Medication Prescriber:  PCP and MAT   Taking meds as prescribed? Yes  Medication side effects or concerns:  Withdrawal from Methadone if not dosed at appropriate time.   Outside medical appointments this week (list provider and reason for visit):  None  Narrative: Patient reports both knees are in poor condition. Patient identified a need for knee replacements. She identified  she is working with providers to address her concerns.  Patient identifies she struggles to slow down and take it easy on her self, we discussed self care and importance as her physical health is the trigger for her substance use. Patient had a cortizone injection and reports she will be following up in the next 2 months for an additional injection on top of her regular. She did also get her glasses and is wearing them and finding benefit. Patient reported some back pain over the last week related to moving items around her home however endorsed it is manageable.     Dimension 3: Emotional/Behavioral Conditions & Complications -   Previous Dimension Ratin  Current Dimension Ratin  PHQ9   No flowsheet data found.  GAD7   No flowsheet data found.  Mental health diagnosis None   Date of last SIB:  NA  Date of  last SI:  NA  Date of last HI: NA  Behavioral Targets:  Develop coping skills to manage grief and loss.   Current MH Assignments:  Engage in mindfulness   Narrative:  Patient reports no mental health diagnoses. She did report experiencing significant grief and loss over the last year. Patient is not currently working with  providers however would benefit, we discussed this in a 1x1 and patient will think about it when she steps down to phase 2 and has 1 less day of treatment services. Patient is working on developing healthy boundaries with her family so she can focus time on self care and her mental health. She believes this will be helpful for her overall to work on herself more and be less of a caretaker. No new or emergent concerns endorsed over the last week. She endorses overall stability.     Dimension 4: Treatment Acceptance / Resistance -   Previous Dimension Ratin  Current Dimension Ratin  OCTAVIO Diagnosis:  304.30 (F12.20) Cannabis Use Disorder Severe     Opioid Use Disorder, Specify if:  On a maintenance therapy with a severity of:  304.00 (F11.20) Severe  Stage: 1  Commitment to tx  process/Stage of change: Contemplation  OCTAVIO assignments:  Initial relapse prevention plan and goodbye letter.   Narrative: Patient is an active and engaged group member. Patient was placed on a treatment contract due to continued use and failure to attend group at the scheduled time. Topic was relapse prevention. Patient is on alternate schedule where she leaves group at 9:00 AM and returns by 9:30 AM to bring her ryland to school. Patient is working on her goodbye letter.     Dimension 5: Relapse / Continued Problem Potential -   Previous Dimension Rating:  3  Current Dimension Rating:  3  Relapses this week: None  Urges to use:  None  UA results: No results found for this or any previous visit (from the past 168 hour(s)).  Narrative: Patient has minimal coping skills to manage urges or triggers as evidence by her use after admittance into OP programing. She identifies her use is related to pain and lack of pain management. She has no relapse prevention plan at this time yet has been given an assignment for a relapse prevention plan. She reports she is motivated for recovery and has a desire for abstinence so she is eligible for a knee replacement. Patient is in need of coping skills as she has very few yet she appears to be applying them as she finds fit.Patient may be at high risk for further use due to lacking in personal coping skills. Patient identified she is working on staying away from people who are historically triggering for her. She identified she has been using skills to avoid peers who could be triggering. No relapses, urges or triggers reported over the last week.     Dimension 6: Recovery Environment -   Previous Dimension Ratin  Current Dimension Ratin  Family Involvement : None    Summarize attendance at family groups and family sessions: NA  Family supportive of treatment?  Yes  Community support group attendance: None  Recreational activities: spending time with her grandchildren    Narrative: Patient has support from her family and that her son calls her daily. Patient is not attending any sober support meetings at this time however has been given a meeting list and information. 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  Patient reports no current legal concerns. Patient would benefit from additional structure and routine. She had time this week to focus on herself and found benefit in that. She has been working on a goal to organize her home. She identified she is going to go to the movies this weekend for self care again. She identified she is taking more time for herself and has really been enjoying that. She has also been meeting her weekly goals.     TOPIC  DATES   8 Dimensions of Wellness 9/6/21-9/10/21   Medical Aspects  9/13-9/16/21   Emotional Wellbeing/Mental Health  9/20-9/24/21   Acceptance  9/20/21-9/30/21   Relationships  10/4/21-10/8/21   Relapse Prevention  10/18/21-10/22/21   Sober Structure  10/11/21-10/15/21   Grief and Loss  8/30/21-9/2/21     Justification for Continued Treatment at this Level of Care: Patient is at high risk for continued use and relapse. Patient has pain and that leads to relapse. Patient is in need of mental health services. She would benefit from gaining insight to relapse process.     Discharge Planning:  Target Discharge Date/Timeframe:  3/1/21   Med Mgmt Provider/Appt:  Daily due to MAT    Ind therapy Provider/Appt:  NA    Family therapy Provider/Appt:  NA   Other referrals:  Patient may need referral for pain clinic, PCP     Has vulnerable adult status change? N/A    Service Coordination:  Patient is scheduled for regular 1x1 sessions.     Supervision:  Patient is staffed with treatment providers, this includes: STEPHANY York and STEPHANY Pierre.       Are Treatment Plan goals/objectives effective? Yes  *If no, list changes to treatment plan:    Are the current goals meeting client's needs? Yes  *If no,  list the changes to treatment plan.    Client Input / Response: Agree      *Client agrees with any changes to the treatment plan: Yes  *Client received copy of changes: Yes  *Client is aware of right to access a treatment plan review: Yes

## 2021-10-25 ENCOUNTER — VIRTUAL VISIT (OUTPATIENT)
Dept: ADDICTION MEDICINE | Facility: CLINIC | Age: 56
End: 2021-10-25
Payer: MEDICARE

## 2021-10-25 DIAGNOSIS — F11.20 OPIOID USE DISORDER, SEVERE, DEPENDENCE (H): Primary | ICD-10-CM

## 2021-10-25 PROCEDURE — 90853 GROUP PSYCHOTHERAPY: CPT | Mod: GT

## 2021-10-25 NOTE — GROUP NOTE
"Group Therapy Documentation    PATIENT'S NAME: Roger Fallon  MRN:   [id number]  :   1965  ACCT. NUMBER: [id number]  DATE OF SERVICE: 10/25/21  START TIME:  8:30 AM  END TIME: 11:30 AM  FACILITATOR(S): Nevin Monet LADC; Trista Best LADC  TOPIC: BEH Group Therapy  Number of patients attending the group:  6  Group Length:  3 Hours    Group Therapy Type: Addiction and Psychoeducation    Summary of Group / Topics Discussed:    Cognitive Therapy Techniques, Meditation/Breathing Exercises, Mindfulness, and coping skills      Supervising MD: Dr. Garber     Telemedicine Visit: The patient's condition can be safely assessed and treated via synchronous audio and visual telemedicine encounter.      Reason for Telemedicine Visit: Services only offered telehealth    Originating Site (Patient Location): Patient's home    Distant Site (Provider Location): Provider Remote Setting- Home Office    Consent:  The patient/guardian has verbally consented to: the potential risks and benefits of telemedicine (video visit) versus in person care; bill my insurance or make self-payment for services provided; and responsibility for payment of non-covered services.     Mode of Communication:  Video Conference via Zoom    As the provider I attest to compliance with applicable laws and regulations related to telemedicine.      Group Attendance:  Attended group session    Patient's response to the group topic/interactions:  cooperative with task and discussed personal experience with topic    Patient appeared to be Actively participating, Attentive and Engaged.        Client specific details:  Patient reported she has started to get a cold so is not feeling the best. She did identify taking time for rest which is a big deal for her as she tends to be a \"busy body\".      "

## 2021-10-27 ENCOUNTER — VIRTUAL VISIT (OUTPATIENT)
Dept: ADDICTION MEDICINE | Facility: CLINIC | Age: 56
End: 2021-10-27
Payer: MEDICARE

## 2021-10-27 DIAGNOSIS — F11.20 OPIOID USE DISORDER, SEVERE, DEPENDENCE (H): Primary | ICD-10-CM

## 2021-10-27 PROCEDURE — 90853 GROUP PSYCHOTHERAPY: CPT | Mod: PO

## 2021-10-27 NOTE — GROUP NOTE
Group Therapy Documentation    PATIENT'S NAME: Roger Fallon  MRN:   [id number]  :   1965  ACCT. NUMBER: [id number]  DATE OF SERVICE: 10/27/21  START TIME:  8:30 AM  END TIME: 11:30 AM  FACILITATOR(S): Nevin Monet LADC  TOPIC: BEH Group Therapy  Number of patients attending the group:  5  Group Length:  3 Hours    Group Therapy Type: Addiction and Psychoeducation    Summary of Group / Topics Discussed:    Cognitive Therapy Techniques, Coping Skills/Lifestyle Managemet, Meditation/Breathing Exercises, Relaxation Techniques, and Self-Care Activities    Supervising MD: Dr. Garber     Telemedicine Visit: The patient's condition can be safely assessed and treated via synchronous audio and visual telemedicine encounter.      Reason for Telemedicine Visit: Services only offered telehealth    Originating Site (Patient Location): Patient's home    Distant Site (Provider Location): Provider Remote Setting- Home Office    Consent:  The patient/guardian has verbally consented to: the potential risks and benefits of telemedicine (video visit) versus in person care; bill my insurance or make self-payment for services provided; and responsibility for payment of non-covered services.     Mode of Communication:  Video Conference via [employer]    As the provider I attest to compliance with applicable laws and regulations related to telemedicine.      Group Attendance:  Attended group session    Patient's response to the group topic/interactions:  cooperative with task and discussed personal experience with topic    Patient appeared to be Actively participating, Attentive and Engaged.        Client specific details:  Patient discussed she has not done self care over the last week and has been doing a lot of tasks. She identified what she can do today to take time for herself.

## 2021-10-28 ENCOUNTER — VIRTUAL VISIT (OUTPATIENT)
Dept: ADDICTION MEDICINE | Facility: CLINIC | Age: 56
End: 2021-10-28
Payer: MEDICARE

## 2021-10-28 DIAGNOSIS — F11.20 OPIOID USE DISORDER, SEVERE, DEPENDENCE (H): Primary | ICD-10-CM

## 2021-10-28 PROCEDURE — 90853 GROUP PSYCHOTHERAPY: CPT | Mod: GT

## 2021-10-28 NOTE — GROUP NOTE
Group Therapy Documentation    PATIENT'S NAME: Roger Fallon  MRN:   [id number]  :   1965  ACCT. NUMBER: [id number]  DATE OF SERVICE: 10/28/21  START TIME:  8:30 AM  END TIME: 11:30 AM  FACILITATOR(S): Nevin Monet LADC; Tritsa Best LADC  TOPIC: BEH Group Therapy  Number of patients attending the group:  6  Group Length:  3 Hours    Group Therapy Type: Addiction and Psychoeducation    Summary of Group / Topics Discussed:    Cognitive Therapy Techniques, Meditation/Breathing Exercises, and Mindfulness    Supervising MD: Dr. Garber     Telemedicine Visit: The patient's condition can be safely assessed and treated via synchronous audio and visual telemedicine encounter.      Reason for Telemedicine Visit: Services only offered telehealth    Originating Site (Patient Location): Patient's home    Distant Site (Provider Location): Provider Remote Setting- Home Office    Consent:  The patient/guardian has verbally consented to: the potential risks and benefits of telemedicine (video visit) versus in person care; bill my insurance or make self-payment for services provided; and responsibility for payment of non-covered services.     Mode of Communication:  Video Conference via [employer]    As the provider I attest to compliance with applicable laws and regulations related to telemedicine.      Group Attendance:  Attended group session    Patient's response to the group topic/interactions:  cooperative with task and discussed personal experience with topic    Patient appeared to be Actively participating, Attentive and Engaged.        Client specific details:  Patient shared her plans for the weekend. She identified taking time out for herself and self care needs. Patient identified what her high risk situations would be and discussed coping mechanisms she can use.

## 2021-10-29 ENCOUNTER — DOCUMENTATION ONLY (OUTPATIENT)
Dept: ADDICTION MEDICINE | Facility: CLINIC | Age: 56
End: 2021-10-29

## 2021-10-30 NOTE — PROGRESS NOTES
[facility] Weekly Treatment Plan Review      ATTENDANCE for the following date span:  10/25/21-10/31/21    Date     Group Therapy 3    0 hours No group  hours 3 hours 3 hours No group    Individual Therapy        Family Therapy        Other (Specify) Phase 1 &2  Phase 3  Phase 1  Phase 1 & 2        Patient had 0 absences during this time period.      Total hours: 61/108. Patient is in phase 1.     Weekly Treatment Plan Review     Treatment Plan initiated on: 21.    Dimension1: Acute Intoxication/Withdrawal Potential -   Previous Dimension Ratin  Current Dimension Ratin  Date of Last Use: 21  Any reports of withdrawal symptoms: Yes, Patient has withdrawal symptoms if she does not take her Methadone at scheduled time  Narrative: Patient reports date of last use of opioids and marijuana to be on 21. Patient's MAT clinic increased her dosage on 10/10/21.  Patient reports use of heroin on 21, she reports no withdrawal symptoms or concerns.  No use endorsed over the last week.     Dimension 2: Biomedical Conditions & Complications -   Previous Dimension Ratin  Current Dimension Ratin  Medical Concerns:  Patient has bad knees, she also is being monitored by PCP for biomedical concerns.   Current Medications & Medication Changes:  Current Outpatient Medications   Medication     escitalopram (LEXAPRO) 20 MG tablet     METFORMIN HCL PO     METHADONE HCL PO     OXcarbazepine (TRILEPTAL) 300 MG tablet     No current facility-administered medications for this visit.     Medication Prescriber:  PCP and MAT   Taking meds as prescribed? Yes  Medication side effects or concerns:  Withdrawal from Methadone if not dosed at appropriate time.   Outside medical appointments this week (list provider and reason for visit):  None  Narrative: Patient reports both knees are in poor condition. Patient identified a need for knee replacements. She identified  she is working with providers to address her concerns. Patient identifies she struggles to slow down and take it easy on her self, we discussed self care and importance as her physical health is the trigger for her substance use. Patient had a cortizone injection and reports she will be following up in the next 2 months for an additional injection on top of her regular. She did also get her glasses and is wearing them and finding benefit. Patient reported some back pain and coming down with cold-like symptoms this week which she is managing with rest. She reports intentions to schedule a neurology appointment. No other significant health concerns reported this week.    Dimension 3: Emotional/Behavioral Conditions & Complications -   Previous Dimension Ratin  Current Dimension Ratin  PHQ9   No flowsheet data found.  GAD7   No flowsheet data found.  Mental health diagnosis None   Date of last SIB:  NA  Date of  last SI:  NA  Date of last HI: NA  Behavioral Targets:  Develop coping skills to manage grief and loss.   Current MH Assignments:  Engage in mindfulness   Narrative:  Patient reports no mental health diagnoses. She did report experiencing significant grief and loss over the last year. Patient is not currently working with MH providers however would benefit, we discussed this in a 1x1 and patient will think about it when she steps down to phase 2. She dicussed preference to return to her former therapist and will call to schedule an appointment. Patient is continuing to work on developing healthy boundaries with her family so she can focus time on self care and her mental health. She expresses that this will be helpful for her overall to work on herself more and be less of a caretaker. No new or emergent concerns endorsed over the last week. She endorses overall stability.     Dimension 4: Treatment Acceptance / Resistance -   Previous Dimension Ratin  Current Dimension Ratin  OCTAVIO Diagnosis:   304.30 (F12.20) Cannabis Use Disorder Severe     Opioid Use Disorder, Specify if:  On a maintenance therapy with a severity of:  304.00 (F11.20) Severe  Stage: 1  Commitment to tx process/Stage of change: Contemplation  OCTAVIO assignments:  Initial relapse prevention plan and goodbye letter.   Narrative: Patient is an active and engaged group member. Patient was placed on a treatment contract due to continued use and failure to attend group at the scheduled time. She is scheduled to transition to phase 2 next week on 11/1/21 Patient is on alternate schedule where she leaves group at 9:00 AM and returns by 9:30 AM to bring her ryland to school. Patient is working on her goodbye letter. This weeks education was relapse prevention and mindfulness. Patient participated in morning meditation, check-in and topic discussions.     Dimension 5: Relapse / Continued Problem Potential -   Previous Dimension Rating:  3  Current Dimension Rating:  3  Relapses this week: None  Urges to use:  None  UA results: No results found for this or any previous visit (from the past 168 hour(s)).  Narrative: Patient has minimal coping skills to manage urges or triggers as evidence by her use after admittance into OP programing. She identifies her use is related to pain and lack of pain management. She has no relapse prevention plan at this time yet has been given an assignment for a relapse prevention plan. She reports she is motivated for recovery and has a desire for abstinence so she is eligible for a knee replacement. Patient is in need of coping skills as she has very few yet she appears to be applying them as she finds fit. Patient may be at high risk for further use due to lacking in personal coping skills. Patient identified she is working on staying away from people who are historically triggering for her. She identified she has been using some healthy boundries with using acquaintances and friends. No relapses, urges or triggers  reported over the last week.     Dimension 6: Recovery Environment -   Previous Dimension Ratin  Current Dimension Ratin  Family Involvement : None    Summarize attendance at family groups and family sessions: NA  Family supportive of treatment?  Yes  Community support group attendance: None  Recreational activities: spending time with her grandchildren   Narrative: Patient has support from her family and that her son calls her daily. Patient is not attending any sober support meetings at this time however has been given a meeting list and information. 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  Patient reports no current legal concerns. Patient would benefit from additional structure and routine. She reports managing to find some time to focus on herself and found benefit in that. She has been working on a goal to organize her home. She identified she is going to go to buy a winter coat and  , also hand out oween candy this weekend for self care. She has also been meeting her weekly goals. Patient is being encouraged to attend NA and/or other sober support meetings, such as support groups for women.     TOPIC  DATES   8 Dimensions of Wellness 21-9/10/21   Medical Aspects  -21   Emotional Wellbeing/Mental Health  -21   Acceptance  21-21   Relationships  10/4/21-10/8/21   Relapse Prevention  10/18/21-10/22/21, 10,25, 10/27, 10/28,   Sober Structure  10/11/21-10/15/21   Grief and Loss  21-21     Justification for Continued Treatment at this Level of Care: Patient is at high risk for continued use and relapse. Patient has pain and that leads to relapse. Patient is in need of mental health services. She would benefit from gaining insight to relapse process.     Discharge Planning:  Target Discharge Date/Timeframe:  3/1/21   Med Mgmt Provider/Appt:  Daily due to MAT    Ind therapy Provider/Appt:  SHELLI    Family therapy Provider/Appt:  SHELLI    Other referrals:  Patient may need referral for pain clinic, PCP     Has vulnerable adult status change? N/A    Service Coordination:  Patient is scheduled for regular 1x1 sessions.     Supervision:  Patient is staffed with treatment providers, this includes: STEPHANY York and STEPHANY Pierre.       Are Treatment Plan goals/objectives effective? Yes  *If no, list changes to treatment plan:    Are the current goals meeting client's needs? Yes  *If no, list the changes to treatment plan.    Client Input / Response: Agree      *Client agrees with any changes to the treatment plan: Yes  *Client received copy of changes: Yes  *Client is aware of right to access a treatment plan review: Yes

## 2021-11-01 ENCOUNTER — VIRTUAL VISIT (OUTPATIENT)
Dept: ADDICTION MEDICINE | Facility: CLINIC | Age: 56
End: 2021-11-01
Payer: MEDICARE

## 2021-11-01 DIAGNOSIS — F11.20 OPIOID USE DISORDER, SEVERE, DEPENDENCE (H): Primary | ICD-10-CM

## 2021-11-01 PROCEDURE — 90853 GROUP PSYCHOTHERAPY: CPT | Mod: PO

## 2021-11-01 NOTE — GROUP NOTE
Group Therapy Documentation    PATIENT'S NAME: Roger Fallon  MRN:   [id number]  :   1965  ACCT. NUMBER: [id number]  DATE OF SERVICE: 21  START TIME:  8:30 AM  END TIME: 11:30 AM  FACILITATOR(S): Nevin Monet LADC; Trista Best LADC  TOPIC: BEH Group Therapy  Number of patients attending the group:  7  Group Length:  3 Hours    Group Therapy Type: Addiction and Psychoeducation    Summary of Group / Topics Discussed:    Cognitive Therapy Techniques, Grief & Loss, Meditation/Breathing Exercises, and Mindfulness    Supervising MD: Dr. Garber     Telemedicine Visit: The patient's condition can be safely assessed and treated via synchronous audio and visual telemedicine encounter.      Reason for Telemedicine Visit: Services only offered telehealth    Originating Site (Patient Location): Patient's home    Distant Site (Provider Location): Provider Remote Setting- Home Office    Consent:  The patient/guardian has verbally consented to: the potential risks and benefits of telemedicine (video visit) versus in person care; bill my insurance or make self-payment for services provided; and responsibility for payment of non-covered services.     Mode of Communication:  Video Conference via [employer]    As the provider I attest to compliance with applicable laws and regulations related to telemedicine.      Group Attendance:  Attended group session    Patient's response to the group topic/interactions:  cooperative with task and discussed personal experience with topic    Patient appeared to be Actively participating, Attentive and Engaged.        Client specific details:  Patient discussed grief in relation to her abilities and her pain. She stated as she has gotten older her pain management has become harder and her ability to get things done has changed. She continues to work on developing coping skills.

## 2021-11-04 ENCOUNTER — VIRTUAL VISIT (OUTPATIENT)
Dept: ADDICTION MEDICINE | Facility: CLINIC | Age: 56
End: 2021-11-04
Payer: MEDICARE

## 2021-11-04 DIAGNOSIS — F11.20 OPIOID USE DISORDER, SEVERE, DEPENDENCE (H): Primary | ICD-10-CM

## 2021-11-04 PROCEDURE — 90853 GROUP PSYCHOTHERAPY: CPT | Mod: PO

## 2021-11-04 NOTE — GROUP NOTE
Group Therapy Documentation    PATIENT'S NAME: Roger Fallon  MRN:   [id number]  :   1965  ACCT. NUMBER: [id number]  DATE OF SERVICE: 21  START TIME:  8:30 AM  END TIME: 11:30 AM  FACILITATOR(S): Nevin Monet LADC; Trista Best LADC  TOPIC: BEH Group Therapy  Number of patients attending the group:  6  Group Length:  3 Hours    Group Therapy Type: Addiction and Psychoeducation    Summary of Group / Topics Discussed:    Cognitive Therapy Techniques, Coping Skills/Lifestyle Managemet, Meditation/Breathing Exercises, and Relationships    Supervising MD: Dr. Garber       Video Visit:      Provider verified identity through the following two step process.  Patient provided:  Patient is known previously to provider and Patient was verified at admission/transfer    Telemedicine Visit: The patient's condition can be safely assessed and treated via synchronous audio and visual telemedicine encounter.      Reason for Telemedicine Visit: Services only offered telehealth    Originating Site (Patient Location): Patient's home    Distant Site (Provider Location): Provider Remote Setting- Home Office    Consent:  The patient/guardian has verbally consented to: the potential risks and benefits of telemedicine (video visit) versus in person care; bill my insurance or make self-payment for services provided; and responsibility for payment of non-covered services.     Patient would like the video invitation sent by:  Send to e-mail at: [email or web address]    Mode of Communication:  Video Conference via Medical Zoom    As the provider I attest to compliance with applicable laws and regulations related to telemedicine.  Group Attendance:  Attended group session    Patient's response to the group topic/interactions:  cooperative with task and discussed personal experience with topic    Patient appeared to be Actively participating, Attentive and Engaged.        Client specific details: Patient  identified concerns her children have brought up to her about her substance use, she identified how that made her feel and how she responded to their concerns. She has identified boundaries she has set with her family and how they have been beneficial.

## 2021-11-05 ENCOUNTER — DOCUMENTATION ONLY (OUTPATIENT)
Dept: ADDICTION MEDICINE | Facility: CLINIC | Age: 56
End: 2021-11-05

## 2021-11-05 NOTE — PROGRESS NOTES
[facility] Weekly Treatment Plan Review      ATTENDANCE for the following date span:  21-21    Date     Group Therapy 3    0 hours No group  hours 3 hours 3 hours No group    Individual Therapy        Family Therapy        Other (Specify) Phase 1 &2  Phase 3  Phase 1  Phase 1 & 2        Patient had 0 absences during this time period.      Total hours: 67/108. Patient is in phase 2.     Weekly Treatment Plan Review     Treatment Plan initiated on: 21.    Dimension1: Acute Intoxication/Withdrawal Potential -   Previous Dimension Ratin  Current Dimension Ratin  Date of Last Use: 21  Any reports of withdrawal symptoms: Yes, Patient has withdrawal symptoms if she does not take her Methadone at scheduled time  Narrative: Patient reports date of last use of opioids and marijuana to be on 21. Patient's MAT clinic increased her dosage on 10/10/21.  Patient reports use of heroin on 21, she reports no withdrawal symptoms or concerns.  No use endorsed over the last week.     Dimension 2: Biomedical Conditions & Complications -   Previous Dimension Ratin  Current Dimension Ratin  Medical Concerns:  Patient has bad knees, she also is being monitored by PCP for biomedical concerns.   Current Medications & Medication Changes:  Current Outpatient Medications   Medication     escitalopram (LEXAPRO) 20 MG tablet     METFORMIN HCL PO     METHADONE HCL PO     OXcarbazepine (TRILEPTAL) 300 MG tablet     No current facility-administered medications for this visit.     Medication Prescriber:  PCP and MAT   Taking meds as prescribed? Yes  Medication side effects or concerns:  Withdrawal from Methadone if not dosed at appropriate time.   Outside medical appointments this week (list provider and reason for visit):  None  Narrative: Patient reports both knees are in poor condition. Patient identified a need for knee replacements. She identified  she is working with providers to address her concerns. Patient identifies she struggles to slow down and take it easy on her self, we discussed self care and importance as her physical health is the trigger for her substance use. Patient had a cortizone injection and reports she will be following up in the next 2 months for an additional injection on top of her regular. She did also get her glasses and is wearing them and finding benefit. She reports intentions to schedule a neurology appointment. No other significant health concerns reported this week.    Dimension 3: Emotional/Behavioral Conditions & Complications -   Previous Dimension Ratin  Current Dimension Ratin  PHQ9   No flowsheet data found.  GAD7   No flowsheet data found.  Mental health diagnosis None   Date of last SIB:  NA  Date of  last SI:  NA  Date of last HI: NA  Behavioral Targets:  Develop coping skills to manage grief and loss.   Current  Assignments:  Engage in mindfulness   Narrative:  Patient reports no mental health diagnoses. She did report experiencing significant grief and loss over the last year. Patient is not currently working with  providers however would benefit, during a check in this wee she reports she is talking with her provider for a referral for individual therapy. She is thinking therapeutic supports will help her in the grief process. Patient is continuing to work on developing healthy boundaries with her family so she can focus time on self care and her mental health. No new or emergent concerns endorsed over the last week. She endorses overall stability.     Dimension 4: Treatment Acceptance / Resistance -   Previous Dimension Ratin  Current Dimension Ratin  OCTAVIO Diagnosis:  304.30 (F12.20) Cannabis Use Disorder Severe     Opioid Use Disorder, Specify if:  On a maintenance therapy with a severity of:  304.00 (F11.20) Severe  Stage: 2  Commitment to tx process/Stage of change: Contemplation  OCTAVIO  assignments:  Initial relapse prevention plan and goodbye letter.   Narrative: Patient is an active and engaged group member. Patient was placed on a treatment contract due to continued use and failure to attend group at the scheduled time. Patient is on alternate schedule where she leaves group at 9:00 AM and returns by 9:30 AM to bring her granddarius to school. Patient is working on her goodbye letter. This weeks education was grief and loss. Patient participated in morning meditation, check-in and topic discussions.     Dimension 5: Relapse / Continued Problem Potential -   Previous Dimension Rating:  3  Current Dimension Rating:  3  Relapses this week: None  Urges to use:  None  UA results: No results found for this or any previous visit (from the past 168 hour(s)).  Narrative: Patient has minimal coping skills to manage urges or triggers as evidence by her use after admittance into OP programing. She identifies her use is related to pain and lack of pain management. She has no relapse prevention plan at this time yet has been given an assignment for a relapse prevention plan. She reports she is motivated for recovery and has a desire for abstinence so she is eligible for a knee replacement. Patient is in need of coping skills as she has very few yet she appears to be applying them as she finds fit. Patient identified she is working on staying away from people who are historically triggering for her. No relapses, urges or triggers reported over the last week.     Dimension 6: Recovery Environment -   Previous Dimension Ratin  Current Dimension Ratin  Family Involvement : None    Summarize attendance at family groups and family sessions: NA  Family supportive of treatment?  Yes  Community support group attendance: None  Recreational activities: spending time with her grandchildren   Narrative: Patient has support from her family and that her son calls her daily. Patient is not attending any sober  support meetings at this time however has been given a meeting list and information. 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  Patient reports no current legal concerns. Patient would benefit from additional structure and routine. She reports managing to find some time to focus on herself and found benefit in that. She has been working on a goal to organize her home.  She has also been meeting her weekly goals. Patient is being encouraged to attend NA and/or other sober support meetings.    TOPIC  DATES   8 Dimensions of Wellness 9/6/21-9/10/21   Medical Aspects  9/13-9/16/21   Emotional Wellbeing/Mental Health  9/20-9/24/21   Acceptance  9/20/21-9/30/21   Relationships  10/4/21-10/8/21   Relapse Prevention  10/18/21-10/22/21, 10,25, 10/27, 10/28,   Sober Structure  10/11/21-10/15/21   Grief and Loss  8/30/21-9/2/21 &11/1/21-11/5/21     Justification for Continued Treatment at this Level of Care: Patient is at high risk for continued use and relapse. Patient has pain and that leads to relapse. Patient is in need of mental health services. She would benefit from gaining insight to relapse process.     Discharge Planning:  Target Discharge Date/Timeframe:  3/1/21   Med Mgmt Provider/Appt:  Daily due to MAT    Ind therapy Provider/Appt:  NA    Family therapy Provider/Appt:  NA   Other referrals:  Patient may need referral for pain clinic, PCP     Has vulnerable adult status change? N/A    Service Coordination:  Patient is scheduled for regular 1x1 sessions.     Supervision:  Patient is staffed with treatment providers, this includes: STEPHANY York and STEPHANY Pierre.       Are Treatment Plan goals/objectives effective? Yes  *If no, list changes to treatment plan:    Are the current goals meeting client's needs? Yes  *If no, list the changes to treatment plan.    Client Input / Response: Agree      *Client agrees with any changes to the treatment plan: Yes  *Client received  copy of changes: Yes  *Client is aware of right to access a treatment plan review: Yes

## 2021-11-08 ENCOUNTER — VIRTUAL VISIT (OUTPATIENT)
Dept: ADDICTION MEDICINE | Facility: CLINIC | Age: 56
End: 2021-11-08
Payer: MEDICARE

## 2021-11-08 DIAGNOSIS — F11.20 OPIOID USE DISORDER, SEVERE, DEPENDENCE (H): Primary | ICD-10-CM

## 2021-11-08 PROCEDURE — 90853 GROUP PSYCHOTHERAPY: CPT | Mod: PO

## 2021-11-08 NOTE — GROUP NOTE
Group Therapy Documentation    PATIENT'S NAME: Roger Fallon  MRN:   [id number]  :   1965  ACCT. NUMBER: [id number]  DATE OF SERVICE: 21  START TIME:  8:30 AM  END TIME: 11:30 AM  FACILITATOR(S): Nevin Monet [facility]; Trista Best LADC  TOPIC: BEH Group Therapy  Number of patients attending the group:  5  Group Length:  3 Hours    Group Therapy Type: Addiction and Psychoeducation    Summary of Group / Topics Discussed:    Balanced Lifestyle , Cognitive Therapy Techniques, Coping Skills/Lifestyle Managemet, Meditation/Breathing Exercises, Mindfulness, Proper Nutrition & Exercise, Relaxation Techniques, Sleep Hygiene, Stress Management, Symptom Management, and Time Management      Supervising MD: Dr. Garber Video Visit:      Provider verified identity through the following two step process.  Patient provided:  Patient is known previously to provider and Patient was verified at admission/transfer    Telemedicine Visit: The patient's condition can be safely assessed and treated via synchronous audio and visual telemedicine encounter.      Reason for Telemedicine Visit: Services only offered telehealth    Originating Site (Patient Location): Patient's home    Distant Site (Provider Location): Provider Remote Setting- Home Office    Consent:  The patient/guardian has verbally consented to: the potential risks and benefits of telemedicine (video visit) versus in person care; bill my insurance or make self-payment for services provided; and responsibility for payment of non-covered services.     Patient would like the video invitation sent by:  Send to e-mail at: [email or web address]    Mode of Communication:  Video Conference via Medical Zoom    As the provider I attest to compliance with applicable laws and regulations related to telemedicine.    Group Attendance:  Attended group session    Patient's response to the group topic/interactions:  cooperative with task, discussed personal  experience with topic, expressed readiness to alter behaviors, expressed understanding of topic, gave appropriate feedback to peers and listened actively    Patient appeared to be Actively participating, Attentive and Engaged.        Client specific details:  Patient discussed that she has been experiencing stress regarding an ongoing 3-year legal dispute with her former landlord to return her rental deposit. She did report experiencing triggers, though did not use. Patient describes she watches movies for self-care. She participated in group discussion on Wellness.

## 2021-11-11 ENCOUNTER — VIRTUAL VISIT (OUTPATIENT)
Dept: ADDICTION MEDICINE | Facility: CLINIC | Age: 56
End: 2021-11-11
Payer: MEDICARE

## 2021-11-11 DIAGNOSIS — F11.20 OPIOID USE DISORDER, SEVERE, DEPENDENCE (H): Primary | ICD-10-CM

## 2021-11-11 PROCEDURE — 90853 GROUP PSYCHOTHERAPY: CPT | Mod: PO

## 2021-11-11 NOTE — GROUP NOTE
Group Therapy Documentation    PATIENT'S NAME: Roger Fallon  MRN:   [id number]  :   1965  ACCT. NUMBER: [id number]  DATE OF SERVICE: 21  START TIME:  8:30 AM  END TIME: 11:30 AM  FACILITATOR(S): Nevin Monet LADC  TOPIC: BEH Group Therapy  Number of patients attending the group:  7  Group Length:  3 Hours    Group Therapy Type: Addiction and Psychoeducation    Summary of Group / Topics Discussed:    Balanced Lifestyle , Cognitive Therapy Techniques, and Meditation/Breathing Exercises    Supervising MD: Dr. Garber       Video Visit:      Provider verified identity through the following two step process.  Patient provided:  Patient is known previously to provider and Patient was verified at admission/transfer    Telemedicine Visit: The patient's condition can be safely assessed and treated via synchronous audio and visual telemedicine encounter.      Reason for Telemedicine Visit: Services only offered telehealth    Originating Site (Patient Location): Patient's home    Distant Site (Provider Location): Provider Remote Setting- Home Office    Consent:  The patient/guardian has verbally consented to: the potential risks and benefits of telemedicine (video visit) versus in person care; bill my insurance or make self-payment for services provided; and responsibility for payment of non-covered services.     Patient would like the video invitation sent by:  Send to e-mail at: [email or web address]    Mode of Communication:  Video Conference via Medical Zoom    As the provider I attest to compliance with applicable laws and regulations related to telemedicine.    Group Attendance:  Attended group session    Patient's response to the group topic/interactions:  cooperative with task and discussed personal experience with topic    Patient appeared to be Actively participating, Attentive and Engaged.        Client specific details:  Patrick expressed she is not feeling well today and is battling a  cold. She reports she is looking forward to the weekend where she can do some self care. She identified ways she is working on balance and also identified goals she would like to start working on for the future.

## 2021-11-12 ENCOUNTER — DOCUMENTATION ONLY (OUTPATIENT)
Dept: ADDICTION MEDICINE | Facility: CLINIC | Age: 56
End: 2021-11-12
Payer: MEDICARE

## 2021-11-12 NOTE — PROGRESS NOTES
[facility] Weekly Treatment Plan Review      ATTENDANCE for the following date span:  -21    Date     Group Therapy 3    0 hours No group  hours 3 hours 3 hours No group    Individual Therapy        Family Therapy        Other (Specify) Phase 1 &2  Phase 3  Phase 1  Phase 1 & 2        Patient had 0 absences during this time period.      Total hours: 73/108. Patient is in phase 2.     Weekly Treatment Plan Review     Treatment Plan initiated on: 21.    Dimension1: Acute Intoxication/Withdrawal Potential -   Previous Dimension Ratin  Current Dimension Ratin  Date of Last Use: 21  Any reports of withdrawal symptoms: Yes, Patient has withdrawal symptoms if she does not take her Methadone at scheduled time  Narrative: Patient reports date of last use of opioids and marijuana to be on 21. Patient's MAT clinic increased her dosage on 10/10/21.  Patient reports use of heroin on 21, she reports no withdrawal symptoms or concerns.  No use endorsed over the last week.     Dimension 2: Biomedical Conditions & Complications -   Previous Dimension Ratin  Current Dimension Ratin  Medical Concerns:  Patient has bad knees, she also is being monitored by PCP for biomedical concerns.   Current Medications & Medication Changes:  Current Outpatient Medications   Medication     escitalopram (LEXAPRO) 20 MG tablet     METFORMIN HCL PO     METHADONE HCL PO     OXcarbazepine (TRILEPTAL) 300 MG tablet     No current facility-administered medications for this visit.     Medication Prescriber:  PCP and MAT   Taking meds as prescribed? Yes  Medication side effects or concerns:  Withdrawal from Methadone if not dosed at appropriate time.   Outside medical appointments this week (list provider and reason for visit):  None  Narrative: Patient reports both knees are in poor condition. Patient identified a need for knee replacements. She identified  she is working with providers to address her concerns. Patient identifies she struggles to slow down and take it easy on her self, we discussed self care and importance as her physical health is the trigger for her substance use. Patient had a cortizone injection and reports she will be following up for an additional injection on top of her regular appoointment. She did also get her glasses and is wearing them and finding benefit. She attempted to schedule an appointment however was told her provider no longer works for the clinic so will be getting a new provider. She is able to access care as needed. She also endorsed having a cold over the last week and is working on self care for that.    Dimension 3: Emotional/Behavioral Conditions & Complications -   Previous Dimension Ratin  Current Dimension Ratin  PHQ9   No flowsheet data found.  GAD7   No flowsheet data found.  Mental health diagnosis None   Date of last SIB:  NA  Date of  last SI:  NA  Date of last HI: NA  Behavioral Targets:  Develop coping skills to manage grief and loss.   Current MH Assignments:  Engage in mindfulness   Narrative:  Patient reports no mental health diagnoses. She did report experiencing significant grief and loss over the last year. Patrick was able to get an individual therapy session scheduled and is looking forward to that in the coming weeks. Patient is continuing to work on developing healthy boundaries with her family so she can focus time on self care and her mental health. No new or emergent concerns endorsed over the last week. She endorses overall stability.     Dimension 4: Treatment Acceptance / Resistance -   Previous Dimension Ratin  Current Dimension Ratin  OCTAVIO Diagnosis:  304.30 (F12.20) Cannabis Use Disorder Severe     Opioid Use Disorder, Specify if:  On a maintenance therapy with a severity of:  304.00 (F11.20) Severe  Stage: 2  Commitment to tx process/Stage of change: Action  OCTAVIO assignments:   Initial relapse prevention plan and goodbye letter.   Narrative: Patient is an active and engaged group member. Patient was placed on a treatment contract due to continued use and failure to attend group at the scheduled time. Patient is on alternate schedule where she leaves group at 9:00 AM and returns by 9:30 AM to bring her ryland to school. Patient is working on her goodbye letter.  Patient participated in morning meditation, check-in and topic discussions.     Dimension 5: Relapse / Continued Problem Potential -   Previous Dimension Rating:  3  Current Dimension Rating:  3  Relapses this week: None  Urges to use:  None  UA results: No results found for this or any previous visit (from the past 168 hour(s)).  Narrative: Patient is developing coping skills and has been working on implementation of skills as well. She has also gained insight into what her triggers are. She identifies her use is related to pain and lack of pain management. She continues to work on her relapse prevention plan. She reports she is motivated for recovery and has a desire for abstinence so she is eligible for a knee replacement. Patient identified she is working on staying away from people who are historically triggering for her. No relapses, urges or triggers reported over the last week.     Dimension 6: Recovery Environment -   Previous Dimension Ratin  Current Dimension Ratin  Family Involvement : None    Summarize attendance at family groups and family sessions: NA  Family supportive of treatment?  Yes  Community support group attendance: None  Recreational activities: spending time with her grandchildren   Narrative: Patient has support from her family and that her son calls her daily. Patient is not attending any sober support meetings at this time however has been given a meeting list and information. 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  Patient reports no current legal  concerns. Patient would benefit from additional structure and routine. She reports managing to find some time to focus on herself and found benefit in that. She has been working on a goal to organize her home.  She has also been meeting her weekly goals. Patient is being encouraged to attend NA and/or other sober support meetings. No change.     TOPIC  DATES   8 Dimensions of Wellness 9/6/21-9/10/21 & 11/8/21-11/12/21   Medical Aspects  9/13-9/16/21   Emotional Wellbeing/Mental Health  9/20-9/24/21   Acceptance  9/20/21-9/30/21   Relationships  10/4/21-10/8/21   Relapse Prevention  10/18/21-10/22/21, 10,25, 10/27, 10/28,   Sober Structure  10/11/21-10/15/21   Grief and Loss  8/30/21-9/2/21 &11/1/21-11/5/21     Justification for Continued Treatment at this Level of Care: Patient is at moderate risk for continued use and relapse. Patient has pain and that leads to relapse historically. Patient is waiting for the first appointment for mental health services. She is gaining insight to relapse process.     Discharge Planning:  Target Discharge Date/Timeframe:  3/1/21   Med Mgmt Provider/Appt:  Daily due to MAT    Ind therapy Provider/Appt:  NA    Family therapy Provider/Appt:  NA   Other referrals:  Patient may need referral for pain clinic, PCP     Has vulnerable adult status change? N/A    Service Coordination:  Patient is scheduled for regular 1x1 sessions.     Supervision:  Patient is staffed with treatment providers, this includes: STEPHANY York and STEPHANY Pierre.       Are Treatment Plan goals/objectives effective? Yes  *If no, list changes to treatment plan:    Are the current goals meeting client's needs? Yes  *If no, list the changes to treatment plan.    Client Input / Response: Agree      *Client agrees with any changes to the treatment plan: Yes  *Client received copy of changes: Yes  *Client is aware of right to access a treatment plan review: Yes

## 2021-11-15 ENCOUNTER — DOCUMENTATION ONLY (OUTPATIENT)
Dept: ADDICTION MEDICINE | Facility: HOSPITAL | Age: 56
End: 2021-11-15
Payer: MEDICARE

## 2021-11-15 NOTE — PROGRESS NOTES
ABSENT NOTE:    Roger Fallon was absent from group 11/15/2021 . This absence was not excused. This writer attempted to contact patient via email.  Patient was encouraged to contact her counselor.      Blanco Juan [facility]  11/15/2021 , 1:58 PM

## 2021-11-18 ENCOUNTER — VIRTUAL VISIT (OUTPATIENT)
Dept: ADDICTION MEDICINE | Facility: CLINIC | Age: 56
End: 2021-11-18
Payer: MEDICARE

## 2021-11-18 DIAGNOSIS — F11.20 OPIOID USE DISORDER, SEVERE, DEPENDENCE (H): Primary | ICD-10-CM

## 2021-11-18 PROCEDURE — 90853 GROUP PSYCHOTHERAPY: CPT | Mod: GT

## 2021-11-18 NOTE — GROUP NOTE
Group Therapy Documentation    PATIENT'S NAME: Roger Fallon  MRN:   [id number]  :   1965  ACCT. NUMBER: [id number]  DATE OF SERVICE: 21  START TIME:  8:30 AM  END TIME: 11:30 AM  FACILITATOR(S): Trista Best LADC  TOPIC: BEH Group Therapy  Number of patients attending the group:  6  Group Length:  3 Hours    Group Therapy Type: Addiction and Psychoeducation    Summary of Group / Topics Discussed:    Cognitive Therapy Techniques, Coping Skills/Lifestyle Managemet, Meditation/Breathing Exercises, Mindfulness, Proper Nutrition & Exercise, Self-Care Activities, and Symptom Management          Group Attendance:  {Group Attendance:[id number]}    Patient's response to the group topic/interactions:  {OPBEHCLIENTRESPONSE:[id number]}    Patient appeared to be {Engagement:[id number]}.        Client specific details:  ***.

## 2021-11-18 NOTE — GROUP NOTE
Group Therapy Documentation    PATIENT'S NAME: Roger Fallon  MRN:   [id number]  :   1965  ACCT. NUMBER: [id number]  DATE OF SERVICE: 21  START TIME:  8:30 AM  END TIME: 11:30 AM  FACILITATOR(S): Trista Best LADC  TOPIC: BEH Group Therapy  Number of patients attending the group:  6  Group Length:  3 Hours    Group Therapy Type: Addiction and Psychoeducation    Summary of Group / Topics Discussed:    Cognitive Therapy Techniques, Co-occurring Illness, Meditation/Breathing Exercises, Mindfulness, Proper Nutrition & Exercise, Self-Care Activities, and Sleep Hygiene      Supervising MD: Dr. Garber     Video Visit:      Provider verified identity through the following two step process.  Patient provided:  Patient is known previously to provider and Patient was verified at admission/transfer    Telemedicine Visit: The patient's condition can be safely assessed and treated via synchronous audio and visual telemedicine encounter.      Reason for Telemedicine Visit: Services only offered telehealth    Originating Site (Patient Location): Patient's home    Distant Site (Provider Location): Provider Remote Setting- Home Office    Consent:  The patient/guardian has verbally consented to: the potential risks and benefits of telemedicine (video visit) versus in person care; bill my insurance or make self-payment for services provided; and responsibility for payment of non-covered services.     Patient would like the video invitation sent by:  Send to e-mail at: [email or web address]    Mode of Communication:  Video Conference via Medical Zoom    As the provider I attest to compliance with applicable laws and regulations related to telemedicine.    Group Attendance:  Attended group session    Patient's response to the group topic/interactions:  cooperative with task, discussed personal experience with topic, expressed readiness to alter behaviors, expressed understanding of topic and gave appropriate  "feedback to peers    Patient appeared to be Actively participating, Attentive and Engaged.        Client specific details:  Patient discussed her mental health as being stable and positive today and that she had a fun family night playing virtual games last night. She describes she went to the dentist to have an impression made for a teeth partial. Patient rates her motivation for recovery as a strong 9, stating \"there is still room for improvement.\"      "

## 2021-11-19 ENCOUNTER — DOCUMENTATION ONLY (OUTPATIENT)
Dept: ADDICTION MEDICINE | Facility: CLINIC | Age: 56
End: 2021-11-19
Payer: MEDICARE

## 2021-11-20 NOTE — PROGRESS NOTES
[facility] Weekly Treatment Plan Review      ATTENDANCE for the following date span:  11/15/21-21    Date     Group Therapy 3    0 hours No group  hours No Group  hours 3 hours No group    Individual Therapy        Family Therapy        Other (Specify) Phase 1 &2  Phase 3  Phase 1  Phase 1 & 2        Patient had 1 absences during this time period, due to not being able to get into group due to a new link.     Total hours: 76/108. Patient is in phase 2.     Weekly Treatment Plan Review     Treatment Plan initiated on: 21.    Dimension1: Acute Intoxication/Withdrawal Potential -   Previous Dimension Ratin  Current Dimension Ratin  Date of Last Use: 21  Any reports of withdrawal symptoms: Yes, Patient has withdrawal symptoms if she does not take her Methadone at scheduled time  Narrative: Patient reports date of last use of opioids and marijuana to be on 21. Patient's MAT clinic increased her dosage on 10/10/21.  Patient reports use of heroin on 21, she reports no withdrawal symptoms or concerns.  No use endorsed over the last week.     Dimension 2: Biomedical Conditions & Complications -   Previous Dimension Ratin  Current Dimension Ratin  Medical Concerns:  Patient has bad knees, she also is being monitored by PCP for biomedical concerns.   Current Medications & Medication Changes:  Current Outpatient Medications   Medication     escitalopram (LEXAPRO) 20 MG tablet     METFORMIN HCL PO     METHADONE HCL PO     OXcarbazepine (TRILEPTAL) 300 MG tablet     No current facility-administered medications for this visit.     Medication Prescriber:  PCP and MAT   Taking meds as prescribed? Yes  Medication side effects or concerns:  Withdrawal from Methadone if not dosed at appropriate time.   Outside medical appointments this week (list provider and reason for visit):  None  Narrative: Patient reports both knees are in poor  condition. Patient identified a need for knee replacements. She identified she is working with providers to address her concerns. Patient identifies she struggles to slow down and take it easy on her self, we discussed self care and importance as her physical health is the trigger for her substance use. Patient had a cortizone injection and reports she will be following up for an additional injection on top of her regular appoointment. She did also get her glasses and is wearing them and finding benefit. She attempted to schedule an appointment however was told her provider no longer works for the clinic so will be getting a new provider. She is able to access care as needed. She did begin exercising over the last week and has been finding enjoyment in that.     Dimension 3: Emotional/Behavioral Conditions & Complications -   Previous Dimension Ratin  Current Dimension Ratin  PHQ9   No flowsheet data found.  GAD7   No flowsheet data found.  Mental health diagnosis None   Date of last SIB:  NA  Date of  last SI:  NA  Date of last HI: NA  Behavioral Targets:  Develop coping skills to manage grief and loss.   Current  Assignments:  Engage in mindfulness   Narrative:  Patient reports no mental health diagnoses. She did report experiencing significant grief and loss over the last year. Patrick was able to get an individual therapy session scheduled and is looking forward to that in the coming weeks. Patient is continuing to work on developing healthy boundaries with her family so she can focus time on self care and her mental health.  Overall she continues to endorse stability in her mental health and is looking forward to her therapy session.     Dimension 4: Treatment Acceptance / Resistance -   Previous Dimension Ratin  Current Dimension Ratin  OCTAVIO Diagnosis:  304.30 (F12.20) Cannabis Use Disorder Severe     Opioid Use Disorder, Specify if:  On a maintenance therapy with a severity of:  304.00  (F11.20) Severe  Stage: 2  Commitment to tx process/Stage of change: Action  OCTAVIO assignments:  Initial relapse prevention plan and goodbye letter.   Narrative: Patient is an active and engaged group member. Patient was placed on a treatment contract due to continued use and failure to attend group at the scheduled time. Patient is on alternate schedule where she leaves group at 9:00 AM and returns by 9:30 AM to bring her ryland to school. Patient is working on her goodbye letter.  Patient participated in morning meditation, check-in and topic discussions. She was absent 1x this week due to a new zoom link.     Dimension 5: Relapse / Continued Problem Potential -   Previous Dimension Rating:  3  Current Dimension Rating:  3  Relapses this week: None  Urges to use:  None  UA results: No results found for this or any previous visit (from the past 168 hour(s)).  Narrative: Patient is developing coping skills and has been working on implementation of skills as well. She has also gained insight into what her triggers are. She identifies her use is related to pain and lack of pain management as well as stress. She continues to work on her relapse prevention plan. She reports she is motivated for recovery and has a desire for abstinence so she is eligible for a knee replacement. Patient identified she is working on staying away from people who are historically triggering for her. No relapses, urges or triggers reported over the last week.     Dimension 6: Recovery Environment -   Previous Dimension Ratin  Current Dimension Ratin  Family Involvement : None    Summarize attendance at family groups and family sessions: NA  Family supportive of treatment?  Yes  Community support group attendance: None  Recreational activities: spending time with her grandchildren   Narrative: Patient has support from her family and that her son calls her daily. Patient is not attending any sober support meetings at this time  however has been given a meeting list and information, she has identified that as she steps down in treatment she would be more willing to try out sober support groups. 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  Patient reports no current legal concerns. Patient would benefit from additional structure and routine. She reports managing to find some time to focus on herself and found benefit in that. She has been working on a goal to organize her home.  She has also been meeting her weekly goals.     TOPIC  DATES   8 Dimensions of Wellness 9/6/21-9/10/21 & 11/8/21-11/12/21   Medical Aspects  9/13-9/16/21 & 11/15/21-11/19/21   Emotional Wellbeing/Mental Health  9/20-9/24/21   Acceptance  9/20/21-9/30/21   Relationships  10/4/21-10/8/21   Relapse Prevention  10/18/21-10/22/21, 10,25, 10/27, 10/28,   Sober Structure  10/11/21-10/15/21   Grief and Loss  8/30/21-9/2/21 &11/1/21-11/5/21     Justification for Continued Treatment at this Level of Care: Patient is at moderate risk for continued use and relapse. Patient has pain and that leads to relapse historically. Patient is waiting for the first appointment for mental health services. She is gaining insight to relapse process.     Discharge Planning:  Target Discharge Date/Timeframe:  3/1/21   Med Mgmt Provider/Appt:  Daily due to MAT    Ind therapy Provider/Appt:  NA    Family therapy Provider/Appt:  NA   Other referrals:  Patient may need referral for pain clinic, PCP     Has vulnerable adult status change? N/A    Service Coordination:  Patient is scheduled for regular 1x1 sessions.     Supervision:  Patient is staffed with treatment providers, this includes: STEPHANY York and STEPHANY Pierre.       Are Treatment Plan goals/objectives effective? Yes  *If no, list changes to treatment plan:    Are the current goals meeting client's needs? Yes  *If no, list the changes to treatment plan.    Client Input / Response:  Agree      *Client agrees with any changes to the treatment plan: Yes  *Client received copy of changes: Yes  *Client is aware of right to access a treatment plan review: Yes

## 2021-11-22 ENCOUNTER — VIRTUAL VISIT (OUTPATIENT)
Dept: ADDICTION MEDICINE | Facility: CLINIC | Age: 56
End: 2021-11-22
Payer: MEDICARE

## 2021-11-22 DIAGNOSIS — F11.20 OPIOID USE DISORDER, SEVERE, DEPENDENCE (H): Primary | ICD-10-CM

## 2021-11-22 PROCEDURE — 90853 GROUP PSYCHOTHERAPY: CPT | Mod: GT

## 2021-11-22 NOTE — GROUP NOTE
Group Therapy Documentation    PATIENT'S NAME: Roger Fallon  MRN:   [id number]  :   1965  ACCT. NUMBER: [id number]  DATE OF SERVICE: 21  START TIME:  8:30 AM  END TIME: 11:30 AM  FACILITATOR(S): Trista Best LADC  TOPIC: BEH Group Therapy  Number of patients attending the group:  7  Group Length:  3 Hours    Group Therapy Type: Addiction and Psychoeducation    Summary of Group / Topics Discussed:    Boundaries, Communication, Distress Tolerance, Meditation/Breathing Exercises, Mindfulness, Self-Care Activities, Stress Management, and Symptom Management    Video Visit:      Provider verified identity through the following two step process.  Patient provided:  Patient is known previously to provider and Patient was verified at admission/transfer    Telemedicine Visit: The patient's condition can be safely assessed and treated via synchronous audio and visual telemedicine encounter.      Reason for Telemedicine Visit: Services only offered telehealth    Originating Site (Patient Location): Patient's home    Distant Site (Provider Location): Provider Remote Setting- Home Office    Consent:  The patient/guardian has verbally consented to: the potential risks and benefits of telemedicine (video visit) versus in person care; bill my insurance or make self-payment for services provided; and responsibility for payment of non-covered services.     Patient would like the video invitation sent by:  Send to e-mail at: [email or web address]    Mode of Communication:  Video Conference via Medical Zoom    As the provider I attest to compliance with applicable laws and regulations related to telemedicine.    Supervising MD: Dr. Garber     Group Attendance:  Attended group session    Patient's response to the group topic/interactions:  cooperative with task, discussed personal experience with topic, expressed understanding of topic, gave appropriate feedback to peers, listened actively and offered helpful  suggestions to peers    Patient appeared to be Actively participating, Attentive and Engaged.        Client specific details:  Patient discussed she is staying physically shopping and doing daily PT knee exercises she learned from therapist at home. She describes that her knee is less painful as a result of that. Patient also discussed that she is scheduled for an appointment to get an impression made for a teeth particial on 11/24/21. She states that she plans to host Thanksgiving dinner for some family members. Patient describes that she was concerned and is now relieved and grateful that her son did not travel to [place] at this time as he had planned.

## 2021-11-24 ENCOUNTER — DOCUMENTATION ONLY (OUTPATIENT)
Dept: ADDICTION MEDICINE | Facility: CLINIC | Age: 56
End: 2021-11-24
Payer: MEDICARE

## 2021-11-29 ENCOUNTER — VIRTUAL VISIT (OUTPATIENT)
Dept: ADDICTION MEDICINE | Facility: CLINIC | Age: 56
End: 2021-11-29
Payer: MEDICARE

## 2021-11-29 DIAGNOSIS — F11.20 OPIOID USE DISORDER, SEVERE, DEPENDENCE (H): Primary | ICD-10-CM

## 2021-11-29 PROCEDURE — 90853 GROUP PSYCHOTHERAPY: CPT | Mod: PO

## 2021-11-29 NOTE — GROUP NOTE
Group Therapy Documentation    PATIENT'S NAME: Roger Fallon  MRN:   [id number]  :   1965  ACCT. NUMBER: [id number]  DATE OF SERVICE: 21  START TIME:  8:30 AM  END TIME: 11:30 AM  FACILITATOR(S): Salomón Taveras LADC; Trista Best LADC  TOPIC: BEH Group Therapy  Number of patients attending the group:  7  Group Length:  3 Hours    Group Therapy Type: Addiction and Psychoeducation    Summary of Group / Topics Discussed:    Balanced Lifestyle , Boundaries, Cognitive Therapy Techniques, Communication, Coping Skills/Lifestyle Managemet, Distress Tolerance, Meditation/Breathing Exercises, Mindfulness, and Relaxation Techniques    Supervising MD: Dr. Garber     Video Visit:      Provider verified identity through the following two step process.  Patient provided:  Patient is known previously to provider and Patient was verified at admission/transfer    Telemedicine Visit: The patient's condition can be safely assessed and treated via synchronous audio and visual telemedicine encounter.      Reason for Telemedicine Visit: Services only offered telehealth    Originating Site (Patient Location): Patient's home    Distant Site (Provider Location): Provider Remote Setting- Home Office    Consent:  The patient/guardian has verbally consented to: the potential risks and benefits of telemedicine (video visit) versus in person care; bill my insurance or make self-payment for services provided; and responsibility for payment of non-covered services.     Patient would like the video invitation sent by:  Send to e-mail at: [email or web address]    Mode of Communication:  Video Conference via Medical Zoom    As the provider I attest to compliance with applicable laws and regulations related to telemedicine.    Group Attendance:  Attended group session    Patient's response to the group topic/interactions:  cooperative with task, discussed personal experience with topic, expressed understanding of topic, gave  appropriate feedback to peers, listened actively and offered helpful suggestions to peers    Patient appeared to be Actively participating, Attentive and Engaged.        Client specific details:  Patient discussed her son came home from [place] to spend Thanksgiving with her. She shared that she had a long conversation with both of her son's about self-care, including scheduling regular check-up's for their health. Patient shared that she is heading her own advise in that she has been paying closer attention to her own health needs.

## 2021-12-02 ENCOUNTER — DOCUMENTATION ONLY (OUTPATIENT)
Dept: ADDICTION MEDICINE | Facility: CLINIC | Age: 56
End: 2021-12-02
Payer: MEDICARE

## 2021-12-02 NOTE — PROGRESS NOTES
ABSENT NOTE:    Malvinjazmine IVY Andreyjustine was absent from group 12/2/2021 . This absence was excused. This patient attempted to contact counselor via phone and left vm to inform she would be absent. Patient is expected to be in group on 12/06/2021.     Trista Best [facility]  12/2/2021 , 2:01 PM

## 2021-12-03 ENCOUNTER — DOCUMENTATION ONLY (OUTPATIENT)
Dept: ADDICTION MEDICINE | Facility: CLINIC | Age: 56
End: 2021-12-03
Payer: MEDICARE

## 2021-12-03 NOTE — PROGRESS NOTES
[facility] Weekly Treatment Plan Review      ATTENDANCE for the following date span:  21-21    Date     Group Therapy 3    0 hours No group  hours No Group  hours No group hours No group    Individual Therapy        Family Therapy        Other (Specify) Phase 1 &2  Phase 3  Phase 1  Phase 1 & 2        Patient had 1 absences during this time period. No group on Thursday due to a medical appointment.     Total hours: 83/108. Patient is in phase 2.     Weekly Treatment Plan Review     Treatment Plan initiated on: 21.    Dimension1: Acute Intoxication/Withdrawal Potential -   Previous Dimension Ratin  Current Dimension Ratin  Date of Last Use: 21  Any reports of withdrawal symptoms: Yes, Patient has withdrawal symptoms if she does not take her Methadone at scheduled time  Narrative: Patient reports date of last use of opioids and marijuana to be on 21. Patient's MAT clinic increased her dosage on 10/10/21.  Patient reports use of heroin on 21, she reports no withdrawal symptoms or concerns. She reports use on 21 & 21    Dimension 2: Biomedical Conditions & Complications -   Previous Dimension Ratin  Current Dimension Ratin  Medical Concerns:  Patient has bad knees, she also is being monitored by PCP for biomedical concerns.   Current Medications & Medication Changes:  Current Outpatient Medications   Medication     escitalopram (LEXAPRO) 20 MG tablet     METFORMIN HCL PO     METHADONE HCL PO     OXcarbazepine (TRILEPTAL) 300 MG tablet     No current facility-administered medications for this visit.     Medication Prescriber:  PCP and MAT   Taking meds as prescribed? Yes  Medication side effects or concerns:  Withdrawal from Methadone if not dosed at appropriate time.   Outside medical appointments this week (list provider and reason for visit):  None  Narrative: Patient reports both knees are in poor condition.  Patient identified a need for knee replacements. She identified she is working with providers to address her concerns. Patient identifies she struggles to slow down and take it easy on her self, we discussed self care and importance as her physical health is the trigger for her substance use. Patient had a cortizone injection and reports she will be following up for an additional injection on top of her regular appoointment. She did also get her glasses and is wearing them and finding benefit. Patrick reports getting a Cortizone injection on 21 in her knees. She identified her pain being high and she is working with providers to manage it.     Dimension 3: Emotional/Behavioral Conditions & Complications -   Previous Dimension Ratin  Current Dimension Ratin  PHQ9   No flowsheet data found.  GAD7   No flowsheet data found.  Mental health diagnosis None   Date of last SIB:  NA  Date of  last SI:  NA  Date of last HI: NA  Behavioral Targets:  Develop coping skills to manage grief and loss.   Current MH Assignments:  Engage in mindfulness   Narrative:  Patient reports no mental health diagnoses. She did report experiencing significant grief and loss over the last year. Patrick was able to get an individual therapy session scheduled and is looking forward to that in the coming weeks. Patient is continuing to work on developing healthy boundaries with her family so she can focus time on self care and her mental health. Overall she continues to endorse stability in her mental health and is looking forward to her therapy session. No new or emergent concerns.     Dimension 4: Treatment Acceptance / Resistance -   Previous Dimension Ratin  Current Dimension Ratin  OCTAVIO Diagnosis:  304.30 (F12.20) Cannabis Use Disorder Severe     Opioid Use Disorder, Specify if:  On a maintenance therapy with a severity of:  304.00 (F11.20) Severe  Stage: 2  Commitment to tx process/Stage of change: Action  OCTAVIO assignments:   Initial relapse prevention plan and goodbye letter.   Narrative: Patient is an active and engaged group member. Patient was placed on a treatment contract due to continued use and failure to attend group at the scheduled time. Patient is on alternate schedule where she leaves group at 9:00 AM and returns by 9:30 AM to bring her ryland to school. Patient is working on her goodbye letter. Absent due to scheduled medical appointment.    Dimension 5: Relapse / Continued Problem Potential -   Previous Dimension Rating:  3  Current Dimension Rating:  3  Relapses this week: None  Urges to use:  None  UA results: No results found for this or any previous visit (from the past 168 hour(s)).  Narrative: Patient is developing coping skills and has been working on implementation of skills as well. She has also gained insight into what her triggers are. She identifies her use is related to pain and lack of pain management as well as stress. She continues to work on her relapse prevention plan. She reports she is motivated for recovery and has a desire for abstinence so she is eligible for a knee replacement. Patient identified she is working on staying away from people who are historically triggering for her.She identified her knee pain as a trigger but was managing it and getting appointments scheduled.     Dimension 6: Recovery Environment -   Previous Dimension Ratin  Current Dimension Ratin  Family Involvement : None    Summarize attendance at family groups and family sessions: NA  Family supportive of treatment?  Yes  Community support group attendance: None  Recreational activities: spending time with her grandchildren   Narrative: Patient has support from her family and that her son calls her daily. Patient is not attending any sober support meetings at this time however has been given a meeting list and information, she has identified that as she steps down in treatment she would be more willing to try out  sober support groups. 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  Patient reports no current legal concerns. Patient would benefit from additional structure and routine. She reports managing to find some time to focus on herself and found benefit in that. She has been working on a goal to organize her home.  She has also been meeting her weekly goals. No changes due to limited mobility due to pain.     TOPIC  DATES   8 Dimensions of Wellness 9/6/21-9/10/21 & 11/8/21-11/12/21   Medical Aspects  9/13-9/16/21 & 11/15/21-11/19/21   Emotional Wellbeing/Mental Health  9/20-9/24/21, 11/22/21,   Acceptance  9/20/21-9/30/21 & 11/29   Relationships  10/4/21-10/8/21   Relapse Prevention  10/18/21-10/22/21, 10,25, 10/27, 10/28,   Sober Structure  10/11/21-10/15/21   Grief and Loss  8/30/21-9/2/21 &11/1/21-11/5/21     Justification for Continued Treatment at this Level of Care: Patient is at moderate risk for continued use and relapse. Patient has pain and that leads to relapse historically. Patient is waiting for the first appointment for mental health services. She is gaining insight to relapse process.     Discharge Planning:  Target Discharge Date/Timeframe:  3/1/21   Med Mgmt Provider/Appt:  Daily due to MAT    Ind therapy Provider/Appt:  NA    Family therapy Provider/Appt:  NA   Other referrals:  Patient may need referral for pain clinic, PCP     Has vulnerable adult status change? N/A    Service Coordination:  Patient is scheduled for regular 1x1 sessions.     Supervision:  Patient is staffed with treatment providers, this includes: STEPHANY York and STEPHANY Pierre.       Are Treatment Plan goals/objectives effective? Yes  *If no, list changes to treatment plan:    Are the current goals meeting client's needs? Yes  *If no, list the changes to treatment plan.    Client Input / Response: Agree      *Client agrees with any changes to the treatment plan: Yes  *Client received copy  of changes: Yes  *Client is aware of right to access a treatment plan review: Yes

## 2021-12-03 NOTE — PROGRESS NOTES
[facility]  Adult Substance Use Disorder Program  Treatment Plan     These services are provided by the facility for each patient/client according to the individual's treatment plan:    Individual and group counseling    Education    Transition services    Services to address any co-occurring mental illness    Service coordination    Criteria for discharge:  Patients/clients are discharged from the program following completion of the entire program including all phases of treatment or acceptance of other post-treatment referrals such as sober supportive living, or aftercare at other facilities.  Patients/clients may also be discharged for inappropriate behavior or substance use.      Favorable Discharge - Patients/clients have completed agreed upon treatment goals, understand their diagnosis and appear motivated for continued recovery.    Guarded Discharge - Patients/clients have demonstrated some understanding of their diagnosis and recovery process, and have completed some of their treatment goals.  This prognosis also includes patients/clients who have completed some treatment goals but have not made commitment to community support or follow through with referrals.    Unfavorable Discharge - Patients/clients have not completed agreed upon treatment goals due to their own choice, have limited understanding of their diagnosis, and have shown minimal or inconsistent behavior conducive to recovery.  Those patients/clients discharged due to behavioral problems will also be unfavorable discharges.    Adult OCTAVIO Treatment Plan                                Patient Name: Roger Fallon  MRN: [id number]  : 1965  56 year old   Identified Gender: female  Admit Date: 21  Current Treatment Phase: 2  Last Updated: 21    SUBSTANCE USE DISORDER(S): 304.00 (F11.20) Opioid Use Disorder Severe      Dimension 1: Acute Intoxication/Withdrawal Potential, Risk Level: 1    Needs identified  from Comprehensive Assessment Summary: Patient reports last use of heroin and marijuana as 08/31/2021. Patient reports minimal withdrawal symptoms at this time. Patient is on MAT-Methadone. Patient likely to experience withdrawal symptoms if Methadone were abruptly discontinued  Update: 12/3/21: Patrick is taking medications as prescribed. Although has reported use since intake she reports no withdrawal concerns.     Date of last use: 9/23/21  Patient currently receiving medication assisted therapy (MAT): Yes  Current or recent withdrawal symptoms: No    Focus area: Date of last use is 9/23/21  Date Assigned: 12/3/2021  Goal: Maintain abstinence in order to avoid withdrawal concerns.   Goal needs to be completed prior to discharge? [x]  Treatment Strategies:   Advise counselor(s) of any changes in medications throughout the course of treatment. and Remain medication compliant    Target Date: 3/22/22  Date Completed:       Dimension 2: Biomedical Conditions/Complications, Risk Level: 1    Needs identified from Comprehensive Assessment Summary: Patient reports physical health is being addressed by providers. Patient has primary care provider. Patient is able to seek cares as needed  Update: 12/3/21: Patrick is accessing care as needed and addressing pain concerns. Overall reporting stable & managable health    Focus area: Significant knee pain   Goal: Manage pain and concerns related to knee.   Goal needs to be completed prior to discharge? []  Methods/Strategies (must include amount and frequency):   1. Roger will report any changes to physical health to counselor.   2. Roger will attend all scheduled doctor's appointments.   3. Roger will take medications as prescribed.   Target Date: 3/22/22  Completion Date:     Focus area/need: Patient reports current tobacco use.   Goal: Patient to receive information about smoking cessation.   Goal needs to be completed prior to discharge? []  Methods/Strategies (must  include amount and frequency):   1. Staff to provide patient with nicotine cessation information and help on how to quit use.   Target Date: 3/22/22  Completion Date:       Dimension 3: Emotional/Behavioral/Cognitive, Risk Level: 2    Needs identified from Comprehensive Assessment Summary: Patient denies mental health concerns though reports recently experiencing mental health symptoms. Patient reports grief related to death of parents. Patient does not have mental health care provider. Patient denies suicidal ideation  Update: 12/3/21: Patrick is working with a therapist to address concerns as they arrise. Overall working on setting boundaries and making time for better self care. Patrick has been working on emotional regulation and developing mindfulness skills.     Focus area: Grief    Goal: Manage mental health and emotional dystregulation   Goal needs to be completed prior to discharge? []  Methods/Strategies (must include amount and frequency):   1. Meet with therapist as schedule    2. Remain medication compliant   3: Assignment(s): Mindfulness  Target Date: 3/22/22/  Completion Date:       Dimension 4: Readiness to Change, Risk Level: 1    Needs identified from Comprehensive Assessment Summary: Patient is cooperative and on a taper at [place]   Update: 12/3/21: Patrick has increased motivation for outpatient programing. She is an active and engaged group member. She is on a substance free contract at this time.     Focus area: Continued use while engaged    Goal: Maintain substance free contract     Goal needs to be completed prior to discharge? []  Methods/Strategies (must include amount and frequency):   1. Roger will attend 3, 3-hour groups per week. Days/Times: Monday and Thursday from 8:30 AM- 11:30 AM   2. Roger will contact staff if unable to attend at [email or web address] or [email or web address].  3. Maintain abstinence contract.   Target Date: 3/22/22   Completion Date:        Dimension 5: Relapse/Continued Use/Continued Problem Potential, Risk Level: 3    Needs identified from Comprehensive Assessment Summary: Patient lacks healthy, positive coping skills. Patient lacks skills to prevent relapse. Patient may not fully recognize impact substance use has on physical and/or mental health. Patient lacks significant periods of sobriety in the past. Patient reports no prior treatment episodes  Update: 12/3/21: Patrick has developed insight into her triggers. She has identified coping skills and has began to implement them. Overall she is taking initiative in her recovery and setting up behaviors to lead to continued relapse.     Focus area: Limited coping skills     Goal: Develop and implement coping skills   Goal needs to be completed prior to discharge? []  Methods/Strategies (must include amount and frequency):   1. Roger will share during each session's check-in any urges and addictive thinking to better understand their pattern of use and to prevent return to use.  2. Identify 5 triggers and 5 coping skills   3. Assignment(s): Initial Relapse Prevention Plan   Target Date: 3/22/22  Completion Date:       Dimension 6: Recovery Environment, Risk Level: 2    Needs identified from Comprehensive Assessment Summary: Patient is unemployed. Patient has stable housing. Patient reports positive support system. Patient reports limited structured daily activities. Patient denies current legal issues. Patient reports past legals.  Update: 12/3/21: Patrick is working on her daily structure and balance. She is identifying if she would like to have sober support meeting as part of her recovery.     Desire for family involvement: No    Focus area/need: Additional sober support and structure    Goal: Identify recovery support and recovery orientated activities   Goal needs to be completed prior to discharge? []  Methods/Strategies (must include amount and frequency):   1. Identify the pros and cons of  sober support group attendance   2. Identify what activities you can engage in that support recovery   3. Assignment(s): None Assigned  Target Date: 3/22/22  Completion Date:     Resources  Resources to which the patient is being referred for problems when problems are to be addressed concurrently by another provider: No Referrals Needed    [x] Contact insurance to ensure referrals are in network    Vulnerable Adult Review   [X] Review of the facility Abuse Prevention plan was reviewed with the patient   [X] No individual abuse plan is necessary   [ ] In addition to the facility Abuse Prevention plan, an Individual Abuse Plan will be put in place     Roger attests their date of last use as 9/23/21. Roger attests they have participated in the creation of this treatment plan. Roger has been provided a copy of this treatment plan and is in agreement with how this plan is written and will be stored in the electronic record.       Patient Signature: _________________________________ Date: _________    Counselor Signature: ______________________________ Date: _________

## 2021-12-06 ENCOUNTER — VIRTUAL VISIT (OUTPATIENT)
Dept: ADDICTION MEDICINE | Facility: CLINIC | Age: 56
End: 2021-12-06
Payer: MEDICARE

## 2021-12-06 DIAGNOSIS — F11.20 OPIOID USE DISORDER, SEVERE, DEPENDENCE (H): Primary | ICD-10-CM

## 2021-12-06 PROCEDURE — 90853 GROUP PSYCHOTHERAPY: CPT | Mod: GT

## 2021-12-06 NOTE — GROUP NOTE
Group Therapy Documentation    PATIENT'S NAME: Roger Fallon  MRN:   [id number]  :   1965  ACCT. NUMBER: [id number]  DATE OF SERVICE: 21  START TIME:  8:30 AM  END TIME: 11:30 AM  FACILITATOR(S): Nevin Monet LADC; Trista Best LADC  TOPIC: BEH Group Therapy  Number of patients attending the group:  8  Group Length:  3 Hours    Group Therapy Type: Life skill(s) and Psychoeducation    Summary of Group / Topics Discussed:    Boundaries, Cognitive Therapy Techniques, Communication, Choices in Recovery, Emotional Regulation, Meditation/Breathing Exercises, Mindfulness, and Self-Care Activities    Supervising MD: Dr. Garber     Video Visit:      Provider verified identity through the following two step process.  Patient provided:  Patient is known previously to provider and Patient was verified at admission/transfer    Telemedicine Visit: The patient's condition can be safely assessed and treated via synchronous audio and visual telemedicine encounter.      Reason for Telemedicine Visit: Services only offered telehealth    Originating Site (Patient Location): Patient's home    Distant Site (Provider Location): Provider Remote Setting- Home Office    Consent:  The patient/guardian has verbally consented to: the potential risks and benefits of telemedicine (video visit) versus in person care; bill my insurance or make self-payment for services provided; and responsibility for payment of non-covered services.     Patient would like the video invitation sent by:  Send to e-mail at: [email or web address]    Mode of Communication:  Video Conference via Medical Zoom    As the provider I attest to compliance with applicable laws and regulations related to telemedicine.    Group Attendance:  Attended group session    Patient's response to the group topic/interactions:  cooperative with task, discussed personal experience with topic, expressed readiness to alter behaviors, expressed understanding  of topic, gave appropriate feedback to peers and listened actively    Patient appeared to be Actively participating, Attentive and Engaged.        Client specific details:  Patient presented as tearful throughout group session. She discussed that she flew out to see her son who became very ill after returning to [place] after visiting her [place]. Patient also discussed that last week she could not find relief from extreme pain in her knees and admitted to using 0.5 gr of heroin for relief. Patient reports she did receive Cortizone injections on 12/2/21 and has less pain currently. She met with this writer and counselor following group session to discuss self-care and a support plan going forward.

## 2021-12-09 ENCOUNTER — VIRTUAL VISIT (OUTPATIENT)
Dept: ADDICTION MEDICINE | Facility: CLINIC | Age: 56
End: 2021-12-09
Payer: MEDICARE

## 2021-12-09 ENCOUNTER — DOCUMENTATION ONLY (OUTPATIENT)
Dept: ADDICTION MEDICINE | Facility: CLINIC | Age: 56
End: 2021-12-09
Payer: MEDICARE

## 2021-12-09 DIAGNOSIS — F11.20 OPIOID USE DISORDER, SEVERE, DEPENDENCE (H): Primary | ICD-10-CM

## 2021-12-09 PROCEDURE — 90853 GROUP PSYCHOTHERAPY: CPT | Mod: PO

## 2021-12-09 NOTE — PROGRESS NOTES
[facility]  Adult Substance Use Disorder Program  Treatment Plan     These services are provided by the facility for each patient/client according to the individual's treatment plan:    Individual and group counseling    Education    Transition services    Services to address any co-occurring mental illness    Service coordination    Criteria for discharge:  Patients/clients are discharged from the program following completion of the entire program including all phases of treatment or acceptance of other post-treatment referrals such as sober supportive living, or aftercare at other facilities.  Patients/clients may also be discharged for inappropriate behavior or substance use.      Favorable Discharge - Patients/clients have completed agreed upon treatment goals, understand their diagnosis and appear motivated for continued recovery.    Guarded Discharge - Patients/clients have demonstrated some understanding of their diagnosis and recovery process, and have completed some of their treatment goals.  This prognosis also includes patients/clients who have completed some treatment goals but have not made commitment to community support or follow through with referrals.    Unfavorable Discharge - Patients/clients have not completed agreed upon treatment goals due to their own choice, have limited understanding of their diagnosis, and have shown minimal or inconsistent behavior conducive to recovery.  Those patients/clients discharged due to behavioral problems will also be unfavorable discharges.    Adult OCTAVIO Treatment Plan                                Patient Name: Roger Fallon  MRN: [id number]  : 1965  56 year old   Identified Gender: female  Admit Date: 21  Current Treatment Phase: 2  Last Updated: 12/3/21    SUBSTANCE USE DISORDER(S): 304.00 (F11.20) Opioid Use Disorder Severe      Dimension 1: Acute Intoxication/Withdrawal Potential, Risk Level: 1    Needs identified  from Comprehensive Assessment Summary: Patient reports last use of heroin and marijuana as 08/31/2021. Patient reports minimal withdrawal symptoms at this time. Patient is on MAT-Methadone. Patient likely to experience withdrawal symptoms if Methadone were abruptly discontinued  Update: 12/3/21: Patrick is taking medications as prescribed. Although has reported use since intake she reports no withdrawal concerns.   12/9/21: Patrick endorsed use of opiates on 12/1/21 & 12/2/21 due to significant pain. She reports no current withdrawal concerns and did reach out to her Methadone counselor as well. No detox services needed at this time.     Date of last use: 12/2/21  Patient currently receiving medication assisted therapy (MAT): Yes  Current or recent withdrawal symptoms: No    Focus area: Date of last use is 12/2/21  Date Assigned: 12/3/2021  Goal: Maintain abstinence in order to avoid withdrawal concerns.   Goal needs to be completed prior to discharge? [x]  Treatment Strategies:   Advise counselor(s) of any changes in medications throughout the course of treatment. and Remain medication compliant    Target Date: 3/22/22  Date Completed:       Dimension 2: Biomedical Conditions/Complications, Risk Level: 1    Needs identified from Comprehensive Assessment Summary: Patient reports physical health is being addressed by providers. Patient has primary care provider. Patient is able to seek cares as needed  Update: 12/3/21: Patrick is accessing care as needed and addressing pain concerns. Overall reporting stable & managable health   12/9/21: Patrick's primary care provider is leaving the clinic so she is in need of a new provider, she is scheduled with a new provider in the coming weeks. She received a knee injection on 12/2/21 and is hoping to follow up with knee surgery in the next 90 days.     Focus area: Significant knee pain   Goal: Manage pain and concerns related to knee.   Goal needs to be completed prior to discharge?  []  Methods/Strategies (must include amount and frequency):   1. Roger will report any changes to physical health to counselor.   2. Roger will attend all scheduled doctor's appointments.   3. Roger will take medications as prescribed.   4. Meet with new surgeon and identify plan of care  Target Date: 3/22/22  Completion Date:     Focus area/need: Patient reports current tobacco use.   Goal: Patient to receive information about smoking cessation.   Goal needs to be completed prior to discharge? []  Methods/Strategies (must include amount and frequency):   1. Staff to provide patient with nicotine cessation information and help on how to quit use.   Target Date: 3/22/22  Completion Date:       Dimension 3: Emotional/Behavioral/Cognitive, Risk Level: 2    Needs identified from Comprehensive Assessment Summary: Patient denies mental health concerns though reports recently experiencing mental health symptoms. Patient reports grief related to death of parents. Patient does not have mental health care provider. Patient denies suicidal ideation  Update: 12/3/21: Patrick is working with a therapist to address concerns as they arrise. Overall working on setting boundaries and making time for better self care. Patrick has been working on emotional regulation and developing mindfulness skills.    12/9/21: Patrick is meeting with her therapist bi-weekly and finding benefit. At this time she continues to work on grief and developing emotional regulation skills.     Focus area: Grief    Goal: Manage mental health and emotional dystregulation   Goal needs to be completed prior to discharge? []  Methods/Strategies (must include amount and frequency):   1. Meet with therapist as scheduled: bi-weekly  2. Remain medication compliant   3: Assignment(s): Mindfulness  Target Date: 3/22/22/  Completion Date:       Dimension 4: Readiness to Change, Risk Level: 1    Needs identified from Comprehensive Assessment Summary: Patient is  cooperative and on a taper at [place]   Update: 12/3/21: Patrick has increased motivation for outpatient programing. She is an active and engaged group member. She is on a substance free contract at this time.   12/9/21: Although Patrick is on a substance free contract she was forthcoming about her recent use and is willing to initiate an action plan with staff and follow up with a 1x1 on 12/16/21. Patrick verbalizes a committment to her recovery at this time and is an active group member. She will remain in an extended phase 2 program due to recent use.     Focus area: Continued use while engaged    Goal: Maintain substance free contract     Goal needs to be completed prior to discharge? []  Methods/Strategies (must include amount and frequency):   1. Roger will attend 2, 3-hour groups per week. Days/Times: Monday and Thursday from 8:30 AM- 11:30 AM   2. Roger will contact staff if unable to attend at [email or web address] or [email or web address].  3. Maintain abstinence contract.   Target Date: 3/22/22   Completion Date:       Dimension 5: Relapse/Continued Use/Continued Problem Potential, Risk Level: 3    Needs identified from Comprehensive Assessment Summary: Patient lacks healthy, positive coping skills. Patient lacks skills to prevent relapse. Patient may not fully recognize impact substance use has on physical and/or mental health. Patient lacks significant periods of sobriety in the past. Patient reports no prior treatment episodes  Update: 12/3/21: Patrick has developed insight into her triggers. She has identified coping skills and has began to implement them. Overall she is taking initiative in her recovery and setting up behaviors to lead to continued relapse.    12/9/21: Despite recent use Patrick does have insight to her triggers however is continuing to work on impulse control skills. She does have coping skills however when triggers are strong she     Focus area: Limited coping skills      Goal: Develop and implement coping skills   Goal needs to be completed prior to discharge? []  Methods/Strategies (must include amount and frequency):   1. Roger will share during each session's check-in any urges and addictive thinking to better understand their pattern of use and to prevent return to use.  2. Identify 5 triggers and 5 coping skills   3. Assignment(s): Initial Relapse Prevention Plan   Target Date: 3/22/22  Completion Date:       Dimension 6: Recovery Environment, Risk Level: 2    Needs identified from Comprehensive Assessment Summary: Patient is unemployed. Patient has stable housing. Patient reports positive support system. Patient reports limited structured daily activities. Patient denies current legal issues. Patient reports past legals.  Update: 12/3/21: Patrick is working on her daily structure and balance. She is identifying if she would like to have sober support meeting as part of her recovery.   12/9/21: Patrick continues to work on the development of structured and balanced daily activities. At this time she is working on slowing down, doing more self care and spending time with family. She is not attending sober support meetings however we have discussed the benefits of engaging.     Desire for family involvement: No    Focus area/need: Additional sober support and structure    Goal: Identify recovery support and recovery orientated activities   Goal needs to be completed prior to discharge? []  Methods/Strategies (must include amount and frequency):   1. Identify the pros and cons of sober support group attendance   2. Identify what activities you can engage in that support recovery   3. Assignment(s): None Assigned  Target Date: 3/22/22  Completion Date:     Resources  Resources to which the patient is being referred for problems when problems are to be addressed concurrently by another provider: No Referrals Needed    [x] Contact insurance to ensure referrals are in  [facility]    Vulnerable Adult Review   [X] Review of the facility Abuse Prevention plan was reviewed with the patient   [X] No individual abuse plan is necessary   [ ] In addition to the facility Abuse Prevention plan, an Individual Abuse Plan will be put in place     Roger attests their date of last use as 9/23/21. Roger attests they have participated in the creation of this treatment plan. Roger has been provided a copy of this treatment plan and is in agreement with how this plan is written and will be stored in the electronic record.       Patient Signature: _________________________________ Date: _________    Counselor Signature: ______________________________ Date: _________

## 2021-12-09 NOTE — PROGRESS NOTES
[facility] Weekly Treatment Plan Review      ATTENDANCE for the following date span:  21-21    Date     Group Therapy 3    0 hours No group  hours No Group  hours 3 hours No group    Individual Therapy        Family Therapy        Other (Specify) Phase 1 &2  Phase 3  Phase 1  Phase 1 & 2        Patient had 0 absences during this time period.     Total hours: 88/108. Patient is in phase 2.     Weekly Treatment Plan Review     Treatment Plan initiated on: 21.    Dimension1: Acute Intoxication/Withdrawal Potential -   Previous Dimension Ratin  Current Dimension Ratin  Date of Last Use: 21  Any reports of withdrawal symptoms: Yes, Patient has withdrawal symptoms if she does not take her Methadone at scheduled time  Narrative: Patient reports date of last use of marijuana to be 21. Reported use on 21 & 21. She is on Methadone and finding benefit in that. 21 &21 reports use of heroin.  No use endorsed over the last week and no new or emergent concerns.     Dimension 2: Biomedical Conditions & Complications -   Previous Dimension Ratin  Current Dimension Ratin  Medical Concerns:  Patient has bad knees, she also is being monitored by PCP for biomedical concerns.   Current Medications & Medication Changes:  Current Outpatient Medications   Medication     escitalopram (LEXAPRO) 20 MG tablet     METFORMIN HCL PO     METHADONE HCL PO     OXcarbazepine (TRILEPTAL) 300 MG tablet     No current facility-administered medications for this visit.     Medication Prescriber:  PCP and MAT   Taking meds as prescribed? Yes  Medication side effects or concerns:  Withdrawal from Methadone if not dosed at appropriate time.   Outside medical appointments this week (list provider and reason for visit):  None  Narrative: Patient reports both knees are in poor condition. Patient identified a need for knee replacements. She identified  she is working with providers to address her concerns. Patient identifies she struggles to slow down and take it easy on her self, we discussed self care and importance as her physical health is the trigger for her substance use. Patrick reports getting a Cortizone injection on 21 in her knees. She identified her pain being high and she is working with providers to manage it. She is hoping to be scheduled for knee surgery in the next 90 days. She is also has a meeting scheduled to meet with her new PCP.     Dimension 3: Emotional/Behavioral Conditions & Complications -   Previous Dimension Ratin  Current Dimension Ratin  PHQ9   No flowsheet data found.  GAD7   No flowsheet data found.  Mental health diagnosis None   Date of last SIB:  NA  Date of  last SI:  NA  Date of last HI: NA  Behavioral Targets:  Develop coping skills to manage grief and loss.   Current MH Assignments:  Engage in mindfulness   Narrative:  Patrick reports no mental health diagnoses, although does report experiencing significant grief and loss over the last year. Patrick was able to get an individual therapy session scheduled and has follow up appointments bi-weekly. Patient is continuing to work on developing healthy boundaries with her family so she can focus time on self care and her mental health. Overall she continues to endorse stability in her mental health.     Dimension 4: Treatment Acceptance / Resistance -   Previous Dimension Ratin  Current Dimension Ratin  OCTAVIO Diagnosis:  304.30 (F12.20) Cannabis Use Disorder Severe     Opioid Use Disorder, Specify if:  On a maintenance therapy with a severity of:  304.00 (F11.20) Severe  Stage: 2  Commitment to tx process/Stage of change: Action  OCTAVIO assignments:  Initial relapse prevention plan and goodbye letter.   Narrative: Patient is an active and engaged group member. Patient was placed on a treatment contract due to continued use and failure to attend group at the scheduled  time. Patient is on alternate schedule where she leaves group at 9:00 AM and returns by 9:30 AM to bring her ryland to school. Patient is working on her goodbye letter. She is on an extended phase 2 currently due to recent use.     Dimension 5: Relapse / Continued Problem Potential -   Previous Dimension Rating:  3  Current Dimension Rating:  3  Relapses this week: None  Urges to use:  None  UA results: No results found for this or any previous visit (from the past 168 hour(s)).  Narrative: Patient is developing coping skills and has been working on implementation of skills as well. She has also gained insight into what her triggers are. She identifies her use is related to pain and lack of pain management as well as stress. She continues to work on her relapse prevention plan. She reports she is motivated for recovery and has a desire for abstinence so she is eligible for a knee replacement. Patient identified she is working on staying away from people who are historically triggering for her. She has identified stress related to recent diagnoses of her son's heart condition and concern for him. She states she is encouraging him to live a healthier lifestyle and also setting limits for herself to manage her emotions related to his diagnoses.     Dimension 6: Recovery Environment -   Previous Dimension Ratin  Current Dimension Ratin  Family Involvement : None    Summarize attendance at family groups and family sessions: NA  Family supportive of treatment?  Yes  Community support group attendance: None  Recreational activities: spending time with her grandchildren   Narrative: Patient has support from her family and that her son calls her daily. Patient is not attending any sober support meetings at this time however has been given a meeting list and information, she has identified that as she steps down in treatment she would be more willing to try out sober support groups. 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  Patient reports no current legal concerns. Patient would benefit from additional structure and routine. She reports managing to find some time to focus on herself and found benefit in that. She has been working on a goal to organize her home.  She has also been meeting her weekly goals. No changes due to limited mobility due to pain.     TOPIC  DATES   8 Dimensions of Wellness 9/6/21-9/10/21 & 11/8/21-11/12/21   Medical Aspects  9/13-9/16/21 & 11/15/21-11/19/21   Emotional Wellbeing/Mental Health  9/20-9/24/21, 11/22/21,   Acceptance  9/20/21-9/30/21 & 11/29   Relationships  10/4/21-10/8/21 & 12/5/21-12/10/21   Relapse Prevention  10/18/21-10/22/21, 10,25, 10/27, 10/28,   Sober Structure  10/11/21-10/15/21   Grief and Loss  8/30/21-9/2/21 &11/1/21-11/5/21     Justification for Continued Treatment at this Level of Care: Patient is at moderate risk for continued use and relapse. Patient has pain and that leads to relapse historically. Patient is waiting for the first appointment for mental health services. She is gaining insight to relapse process.     Discharge Planning:  Target Discharge Date/Timeframe:  3/1/21   Med Mgmt Provider/Appt:  Daily due to MAT    Ind therapy Provider/Appt:  NA    Family therapy Provider/Appt:  NA   Other referrals:  Patient may need referral for pain clinic, PCP     Has vulnerable adult status change? N/A    Service Coordination:  Patient is scheduled for regular 1x1 sessions.     Supervision:  Patient is staffed with treatment providers, this includes: STEPHANY York and STEPHANY Pierre.       Are Treatment Plan goals/objectives effective? Yes  *If no, list changes to treatment plan:    Are the current goals meeting client's needs? Yes  *If no, list the changes to treatment plan.    Client Input / Response: Agree      *Client agrees with any changes to the treatment plan: Yes  *Client received copy of changes: Yes  *Client is  aware of right to access a treatment plan review: Yes

## 2021-12-09 NOTE — GROUP NOTE
Group Therapy Documentation    PATIENT'S NAME: Roger Fallon  MRN:   [id number]  :   1965  ACCT. NUMBER: [id number]  DATE OF SERVICE: 21  START TIME:  8:30 AM  END TIME: 11:30 AM  FACILITATOR(S): Nevin Monet LADC; Trista Best LADC  TOPIC: BEH Group Therapy  Number of patients attending the group:  8  Group Length:  3 Hours    Group Therapy Type: Addiction and Psychoeducation    Summary of Group / Topics Discussed:    Cognitive Therapy Techniques, Meditation/Breathing Exercises, and Relationships    Supervising MD: Dr. Garber     Video Visit:      Provider verified identity through the following two step process.  Patient provided:  Patient is known previously to provider and Patient was verified at admission/transfer    Telemedicine Visit: The patient's condition can be safely assessed and treated via synchronous audio and visual telemedicine encounter.      Reason for Telemedicine Visit: Services only offered telehealth    Originating Site (Patient Location): Patient's home    Distant Site (Provider Location): Provider Remote Setting- Home Office    Consent:  The patient/guardian has verbally consented to: the potential risks and benefits of telemedicine (video visit) versus in person care; bill my insurance or make self-payment for services provided; and responsibility for payment of non-covered services.     Patient would like the video invitation sent by:  Send to e-mail at: [email or web address]    Mode of Communication:  Video Conference via Medical Zoom    As the provider I attest to compliance with applicable laws and regulations related to telemedicine.    Group Attendance:  Attended group session    Patient's response to the group topic/interactions:  cooperative with task and discussed personal experience with topic    Patient appeared to be Actively participating, Attentive and Engaged.        Client specific details:  Patrick reported a relapse to the group on Monday and  today we followed up on her emotional state currently as well as self care. She reports she has been giving herself extra vi and self compassion. She received feedback from her peers who expressed their gratitude for her coming back and being honest with them. Patrick expressed feeling supported and happy to be in group at this time.

## 2021-12-13 ENCOUNTER — VIRTUAL VISIT (OUTPATIENT)
Dept: ADDICTION MEDICINE | Facility: CLINIC | Age: 56
End: 2021-12-13
Payer: MEDICARE

## 2021-12-13 DIAGNOSIS — F11.20 OPIOID USE DISORDER, SEVERE, DEPENDENCE (H): Primary | ICD-10-CM

## 2021-12-13 PROCEDURE — 90853 GROUP PSYCHOTHERAPY: CPT | Mod: PO

## 2021-12-13 NOTE — GROUP NOTE
Group Therapy Documentation    PATIENT'S NAME: Roger Fallon  MRN:   [id number]  :   1965  ACCT. NUMBER: [id number]  DATE OF SERVICE: 21  START TIME:  8:30 AM  END TIME: 11:30 AM  FACILITATOR(S): Trista Best LADC  TOPIC: BEH Group Therapy  Number of patients attending the group:  8  Group Length:  3 Hours    Group Therapy Type: Addiction and Psychoeducation    Summary of Group / Topics Discussed:    Balanced Lifestyle , Boredom, Boundaries, Coping Skills/Lifestyle Managemet, Leisure Exploration/Use of Leisure Time, Meditation/Breathing Exercises, Mindfulness, Proper Nutrition & Exercise, Self-Care Activities, and Time Management    Supervising MD: Dr. Garber     Video Visit:      Provider verified identity through the following two step process.  Patient provided:  Patient is known previously to provider and Patient was verified at admission/transfer    Telemedicine Visit: The patient's condition can be safely assessed and treated via synchronous audio and visual telemedicine encounter.      Reason for Telemedicine Visit: Services only offered telehealth    Originating Site (Patient Location): Patient's home    Distant Site (Provider Location): Provider Remote Setting- Home Office    Consent:  The patient/guardian has verbally consented to: the potential risks and benefits of telemedicine (video visit) versus in person care; bill my insurance or make self-payment for services provided; and responsibility for payment of non-covered services.     Patient would like the video invitation sent by:  Send to e-mail at: [email or web address]    Mode of Communication:  Video Conference via Medical Zoom    As the provider I attest to compliance with applicable laws and regulations related to telemedicine.    Group Attendance:  Attended group session    Patient's response to the group topic/interactions:  cooperative with task, discussed personal experience with topic, expressed understanding of  "topic, gave appropriate feedback to peers and listened actively    Patient appeared to be Actively participating, Attentive and Engaged.        Client specific details:  Patient discussed that she is working on keeping her head up and pushing through the anxiety created by negative self-talk regarding her recent use. She states \"I've been kicking myself in the butt.\" She describes that she is determined to stay sober and is move forward. She describes her goal this week is to track down the sleep jaqueline machine that was supposed to be delivered when the first one was recalled by the .       "

## 2021-12-16 ENCOUNTER — VIRTUAL VISIT (OUTPATIENT)
Dept: ADDICTION MEDICINE | Facility: CLINIC | Age: 56
End: 2021-12-16
Payer: MEDICARE

## 2021-12-16 DIAGNOSIS — F11.20 OPIOID USE DISORDER, SEVERE, DEPENDENCE (H): Primary | ICD-10-CM

## 2021-12-16 PROCEDURE — 90853 GROUP PSYCHOTHERAPY: CPT | Mod: PO

## 2021-12-16 NOTE — GROUP NOTE
Group Therapy Documentation    PATIENT'S NAME: Roger Fallon  MRN:   [id number]  :   1965  ACCT. NUMBER: [id number]  DATE OF SERVICE: 21  START TIME:  8:30 AM  END TIME: 11:30 AM  FACILITATOR(S): Trista Best LADC; Nevin Monet LADC  TOPIC: BEH Group Therapy  Number of patients attending the group:  7  Group Length:  3 Hours    Group Therapy Type: Addiction and Psychoeducation    Summary of Group / Topics Discussed:    Balanced Lifestyle , Boredom, Boundaries, Cognitive Therapy Techniques, Coping Skills/Lifestyle Managemet, Choices in Recovery, Interpersonal Effectiveness, Leisure Exploration/Use of Leisure Time, Meditation/Breathing Exercises, Mindfulness, Proper Nutrition & Exercise, Self-Care Activities, Sleep Hygiene, Stress Management, and Symptom Management    Supervising MD: Dr. Garber     Video Visit:      Provider verified identity through the following two step process.  Patient provided:  Patient is known previously to provider and Patient was verified at admission/transfer    Telemedicine Visit: The patient's condition can be safely assessed and treated via synchronous audio and visual telemedicine encounter.      Reason for Telemedicine Visit: Services only offered telehealth    Originating Site (Patient Location): Patient's home    Distant Site (Provider Location): Provider Remote Setting- Home Office    Consent:  The patient/guardian has verbally consented to: the potential risks and benefits of telemedicine (video visit) versus in person care; bill my insurance or make self-payment for services provided; and responsibility for payment of non-covered services.     Patient would like the video invitation sent by:  Send to e-mail at: [email or web address]    Mode of Communication:  Video Conference via Medical Zoom    As the provider I attest to compliance with applicable laws and regulations related to telemedicine.    Group Attendance:  Attended group  session    Patient's response to the group topic/interactions:  cooperative with task, discussed personal experience with topic, expressed readiness to alter behaviors, expressed understanding of topic, gave appropriate feedback to peers and listened actively    Patient appeared to be Actively participating, Attentive and Engaged.        Client specific details:  Patient discussed she is now making and attending all scheduled medical appointments. She describes attending appointments with her new PCP, in addition to her dentist and therapist all in one day-on 12/15/21. Patient identifies her motivation for ongoing recovery is that she is likes being clear minded and she is loving herself in a healthy way.

## 2021-12-17 ENCOUNTER — DOCUMENTATION ONLY (OUTPATIENT)
Dept: ADDICTION MEDICINE | Facility: CLINIC | Age: 56
End: 2021-12-17
Payer: MEDICARE

## 2021-12-17 NOTE — PROGRESS NOTES
[facility] Weekly Treatment Plan Review      ATTENDANCE for the following date span:  21-21    Date     Group Therapy 3    0 hours No group  hours No Group  hours 3 hours No group    Individual Therapy        Family Therapy        Other (Specify) Phase 1 &2  Phase 3  Phase 1  Phase 1 & 2        Patient had 0 absences during this time period.     Total hours: 94/108. Patient is in phase 2.     Weekly Treatment Plan Review     Treatment Plan initiated on: 21.    Dimension1: Acute Intoxication/Withdrawal Potential -   Previous Dimension Ratin  Current Dimension Ratin  Date of Last Use: 21  Any reports of withdrawal symptoms: Yes, Patient has withdrawal symptoms if she does not take her Methadone at scheduled time  Narrative: Patient reports date of last use of marijuana to be 21. Reported use on 21 & 21. She is on Methadone and finding benefit in that. 21 & 21 heroin use. No use endorsed over the last week and no new or emergent concerns.     Dimension 2: Biomedical Conditions & Complications -   Previous Dimension Ratin  Current Dimension Ratin  Medical Concerns:  Patient has bad knees, she also is being monitored by PCP for biomedical concerns.   Current Medications & Medication Changes:  Current Outpatient Medications   Medication     escitalopram (LEXAPRO) 20 MG tablet     METFORMIN HCL PO     METHADONE HCL PO     OXcarbazepine (TRILEPTAL) 300 MG tablet     No current facility-administered medications for this visit.     Medication Prescriber:  PCP and MAT   Taking meds as prescribed? Yes  Medication side effects or concerns:  Withdrawal from Methadone if not dosed at appropriate time.   Outside medical appointments this week (list provider and reason for visit):  She met with her new PCP   Narrative: Patient reports both knees are in poor condition. Patient identified a need for knee replacements. She  identified she is working with providers to address her concerns. Patient identifies she struggles to slow down and take it easy on her self, we discussed self care and importance as her physical health is the trigger for her substance use. Patrick reports getting a Cortizone injection on 21 in her knees. She is hoping to be scheduled for knee surgery in the next 90 days. Patrick met with her new primary care provider and found the appointment helpful. She confirmed that this provider is not leaving [facility] and because of that she will be staying with this provider. She has not met with a new surgeon yet however hopes to in the coming week(s). She also reports her CPAP machine was recalled so she has not been using it for the last month but has contacted the medical products facility and they reported it was on backorder so she will get it in the new year.     Dimension 3: Emotional/Behavioral Conditions & Complications -   Previous Dimension Ratin  Current Dimension Ratin  PHQ9   No flowsheet data found.  GAD7   No flowsheet data found.  Mental health diagnosis None   Date of last SIB:  NA  Date of  last SI:  NA  Date of last HI: NA  Behavioral Targets:  Develop coping skills to manage grief and loss.   Current MH Assignments:  Engage in mindfulness   Narrative:  Patrick reports no mental health diagnoses, although does report experiencing significant grief and loss over the last year. Patrick was able to get an individual therapy session scheduled and has follow up appointments bi-weekly. Patient is continuing to work on developing healthy boundaries with her family so she can focus time on self care and her mental health. Overall she continues to endorse stability in her mental health and great benefit from therapy.    Dimension 4: Treatment Acceptance / Resistance -   Previous Dimension Ratin  Current Dimension Ratin  OCTAVIO Diagnosis:  304.30 (F12.20) Cannabis Use Disorder Severe     Opioid  Use Disorder, Specify if:  On a maintenance therapy with a severity of:  304.00 (F11.20) Severe  Stage: 2  Commitment to tx process/Stage of change: Action  OCTAVIO assignments:  Initial relapse prevention plan and goodbye letter.   Narrative: Patient is an active and engaged group member. Patient was placed on a treatment contract due to continued use and failure to attend group at the scheduled time. Patient is on alternate schedule where she leaves group at 9:00 AM and returns by 9:30 AM to bring her ryland to school. Patient is working on her goodbye letter. She is on an extended phase 2 currently due to recent use. No change    Dimension 5: Relapse / Continued Problem Potential -   Previous Dimension Rating:  3  Current Dimension Rating:  3  Relapses this week: None  Urges to use:  None  UA results: No results found for this or any previous visit (from the past 168 hour(s)).  Narrative: Patient is developing coping skills and has been working on implementation of skills as well. She has also gained insight into what her triggers are. She identifies her use is related to pain and lack of pain management as well as stress. She continues to work on her relapse prevention plan. She reports she is motivated for recovery and has a desire for abstinence so she is eligible for a knee replacement. Patient identified she is working on staying away from people who are historically triggering for her. She has identified stress related to recent diagnoses of her son's heart condition and concern for him. She states she is encouraging him to live a healthier lifestyle and also setting limits for herself to manage her emotions related to his diagnoses. No new or emergent concerns at this time.     Dimension 6: Recovery Environment -   Previous Dimension Ratin  Current Dimension Ratin  Family Involvement : None    Summarize attendance at family groups and family sessions: NA  Family supportive of treatment?   Yes  Community support group attendance: None  Recreational activities: spending time with her grandchildren   Narrative: Patient has support from her family and that her son calls her daily. Patient is not attending any sober support meetings at this time however has been given a meeting list and information, she has identified that as she steps down in treatment she would be more willing to try out sober support groups. 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  Patient reports no current legal concerns. She reports managing to find some time to focus on herself and found benefit in that. She reports she has been engaging in her hobbies and finding enjoyment in that.     TOPIC  DATES   8 Dimensions of Wellness 9/6/21-9/10/21 & 11/8/21-11/12/21   Medical Aspects  9/13-9/16/21 & 11/15/21-11/19/21   Emotional Wellbeing/Mental Health  9/20-9/24/21, 11/22/21,   Acceptance  9/20/21-9/30/21 & 11/29   Relationships  10/4/21-10/8/21 & 12/5/21-12/10/21   Relapse Prevention  10/18/21-10/22/21, 10,25, 10/27, 10/28,   Sober Structure  10/11/21-10/15/21 & 12/13/21-12/17/21   Grief and Loss  8/30/21-9/2/21 &11/1/21-11/5/21     Justification for Continued Treatment at this Level of Care: Patient is at moderate risk for continued use and relapse. Patient has pain and that leads to relapse historically. Patient is waiting for the first appointment for mental health services. She is gaining insight to relapse process.     Discharge Planning:  Target Discharge Date/Timeframe:  3/1/21   Med Mgmt Provider/Appt:  Daily due to MAT    Ind therapy Provider/Appt:  NA    Family therapy Provider/Appt:  NA   Other referrals:  Patient may need referral for pain clinic, PCP     Has vulnerable adult status change? N/A    Service Coordination:  Patient is scheduled for regular 1x1 sessions.     Supervision:  Patient is staffed with treatment providers, this includes: STEPHANY York and STEPHANY Pierre.        Are Treatment Plan goals/objectives effective? Yes  *If no, list changes to treatment plan:    Are the current goals meeting client's needs? Yes  *If no, list the changes to treatment plan.    Client Input / Response: Agree      *Client agrees with any changes to the treatment plan: Yes  *Client received copy of changes: Yes  *Client is aware of right to access a treatment plan review: Yes

## 2021-12-20 ENCOUNTER — VIRTUAL VISIT (OUTPATIENT)
Dept: ADDICTION MEDICINE | Facility: CLINIC | Age: 56
End: 2021-12-20
Payer: MEDICARE

## 2021-12-20 DIAGNOSIS — F11.20 OPIOID USE DISORDER, SEVERE, DEPENDENCE (H): Primary | ICD-10-CM

## 2021-12-20 PROCEDURE — 90853 GROUP PSYCHOTHERAPY: CPT | Mod: PO

## 2021-12-20 NOTE — GROUP NOTE
Group Therapy Documentation    PATIENT'S NAME: Roger Fallon  MRN:   [id number]  :   1965  ACCT. NUMBER: [id number]  DATE OF SERVICE: 21  START TIME:  8:30 AM  END TIME: 11:30 AM  FACILITATOR(S): Nevin Monet LADC; Trista Best LADC  TOPIC: BEH Group Therapy  Number of patients attending the group:  8  Group Length:  3 Hours    Group Therapy Type: Addiction and Psychoeducation    Summary of Group / Topics Discussed:    Balanced Lifestyle , Choices in Recovery, Interpersonal Effectiveness, Meditation/Breathing Exercises, Mindfulness, Self-Care Activities, Symptom Management, and Time Management    Supervising MD: Dr. Garber     Video Visit:      Provider verified identity through the following two step process.  Patient provided:  Patient is known previously to provider and Patient was verified at admission/transfer    Telemedicine Visit: The patient's condition can be safely assessed and treated via synchronous audio and visual telemedicine encounter.      Reason for Telemedicine Visit: Services only offered telehealth    Originating Site (Patient Location): Patient's home    Distant Site (Provider Location): Provider Remote Setting- Home Office    Consent:  The patient/guardian has verbally consented to: the potential risks and benefits of telemedicine (video visit) versus in person care; bill my insurance or make self-payment for services provided; and responsibility for payment of non-covered services.     Patient would like the video invitation sent by:  Send to e-mail at: [email or web address]    Mode of Communication:  Video Conference via Medical Zoom    As the provider I attest to compliance with applicable laws and regulations related to telemedicine.  Group Attendance:  Attended group session    Patient's response to the group topic/interactions:  cooperative with task, discussed personal experience with topic, expressed readiness to alter behaviors, expressed understanding  of topic, gave appropriate feedback to peers and listened actively    Patient appeared to be Actively participating, Attentive and Engaged.        Client specific details:  Patient discussed being disappointed that she cannot have her knee replacement surgery until 2/2022 and is tired of the daily pain. She describes she has been working with her MH therapist on issues, including internalized anger that she has avoided dealing with, however recognizes the benefit of working through those feelings for clarity and to be able to move forward. She is presenting herself with optimism and determination for recovery.

## 2021-12-23 ENCOUNTER — DOCUMENTATION ONLY (OUTPATIENT)
Dept: ADDICTION MEDICINE | Facility: CLINIC | Age: 56
End: 2021-12-23
Payer: MEDICARE

## 2021-12-23 NOTE — PROGRESS NOTES
ABSENT NOTE:    Roger Fallon was absent from group 12/23/2021 . This absence was excused. Patient contacted counselor via email to report she is not feeling well today. Patient is expected to be in group on  12/27/21.     STEPHANY Ocampo  12/23/2021 , 8:25 AM

## 2021-12-24 ENCOUNTER — DOCUMENTATION ONLY (OUTPATIENT)
Dept: ADDICTION MEDICINE | Facility: CLINIC | Age: 56
End: 2021-12-24
Payer: MEDICARE

## 2021-12-24 NOTE — PROGRESS NOTES
[facility] Weekly Treatment Plan Review      ATTENDANCE for the following date span:  21-21    Date     Group Therapy 3    0 hours No group  hours No Group  hours 3 hours No group    Individual Therapy        Family Therapy        Other (Specify) Phase 1 &2  Phase 3  Phase 1  Phase 1 & 2        Patient had 1 absences during this time period due to not feeling well.     Total hours: 97/108. Patient is in phase 2.     Weekly Treatment Plan Review     Treatment Plan initiated on: 21.    Dimension1: Acute Intoxication/Withdrawal Potential -   Previous Dimension Ratin  Current Dimension Ratin  Date of Last Use: 21  Any reports of withdrawal symptoms: Yes, Patient has withdrawal symptoms if she does not take her Methadone at scheduled time  Narrative: Patient reports date of last use of marijuana to be 21. Reported use on 21 & 21 as well as 21 &21. She is on Methadone and finding benefit in that. No use endorsed over the last week and no new or emergent concerns.     Dimension 2: Biomedical Conditions & Complications -   Previous Dimension Ratin  Current Dimension Ratin  Medical Concerns:  Patient has bad knees, she also is being monitored by PCP for biomedical concerns.   Current Medications & Medication Changes:  Current Outpatient Medications   Medication     escitalopram (LEXAPRO) 20 MG tablet     METFORMIN HCL PO     METHADONE HCL PO     OXcarbazepine (TRILEPTAL) 300 MG tablet     No current facility-administered medications for this visit.     Medication Prescriber:  PCP and MAT   Taking meds as prescribed? Yes  Medication side effects or concerns:  Withdrawal from Methadone if not dosed at appropriate time.   Outside medical appointments this week (list provider and reason for visit):  She met with her new PCP   Narrative: Patient reports both knees are in poor condition. Patient identified a need for  knee replacements. She identified she is working with providers to address her concerns. Patient identifies she struggles to slow down and take it easy on her self, we discussed self care and importance as her physical health is the trigger for her substance use. Patrick reports getting a Cortizone injection on 21 in her knees. She is hoping to be scheduled for knee surgery in the next 90 days. She has not met with a new surgeon yet however hopes to in the coming week(s). She also reports her CPAP machine was recalled so she has not been using it for the last month but has contacted the [facility] facility and they reported it was on backorder so she will get it in the new year. She did send this writer an email on 21 reporting she was not feeling well with a cough, headache and needed to take the day. She is expected to return to group on 21.    Dimension 3: Emotional/Behavioral Conditions & Complications -   Previous Dimension Ratin  Current Dimension Ratin  PHQ9   No flowsheet data found.  GAD7   No flowsheet data found.  Mental health diagnosis None   Date of last SIB:  NA  Date of  last SI:  NA  Date of last HI: NA  Behavioral Targets:  Develop coping skills to manage grief and loss.   Current  Assignments:  Engage in mindfulness   Narrative:  Patrick reports no mental health diagnoses, although does report experiencing significant grief and loss over the last year. Patrick was able to get an individual therapy session scheduled and has follow up appointments bi-weekly. Patient is continuing to work on developing healthy boundaries with her family so she can focus time on self care and her mental health. Overall she continues to endorse stability in her mental health and great benefit from therapy. No changes or emergent concerns.     Dimension 4: Treatment Acceptance / Resistance -   Previous Dimension Ratin  Current Dimension Ratin  OCTAVIO Diagnosis:  304.30 (F12.20) Cannabis  Use Disorder Severe     Opioid Use Disorder, Specify if:  On a maintenance therapy with a severity of:  304.00 (F11.20) Severe  Stage: 2  Commitment to tx process/Stage of change: Action  OCTAVIO assignments:  Initial relapse prevention plan and goodbye letter.   Narrative: Patient is an active and engaged group member. Patient was placed on a treatment contract due to continued use and failure to attend group at the scheduled time. Patient is on alternate schedule where she leaves group at 9:00 AM and returns by 9:30 AM to bring her ryland to school. Patient is working on her goodbye letter. She is on an extended phase 2 currently due to recent use. Patrick did miss one day of group this week due to not feeling well.     Dimension 5: Relapse / Continued Problem Potential -   Previous Dimension Rating:  3  Current Dimension Rating:  3  Relapses this week: None  Urges to use:  None  UA results: No results found for this or any previous visit (from the past 168 hour(s)).  Narrative: Patient is developing coping skills and has been working on implementation of skills as well. She has also gained insight into what her triggers are. She identifies her use episodes are related to pain and lack of pain management as well as stress. She continues to work on her relapse prevention plan. She reports she is motivated for recovery and has a desire for abstinence so she is eligible for a knee replacement. Patient identified she is working on staying away from people who are historically triggering for her. She has identified stress related to recent diagnoses of her son's heart condition and concern for him. She states she is encouraging him to live a healthier lifestyle and also setting limits for herself to manage her emotions related to his diagnoses. No urges or triggers endorsed over the last week.     Dimension 6: Recovery Environment -   Previous Dimension Ratin  Current Dimension Ratin  Family Involvement : None     Summarize attendance at family groups and family sessions: NA  Family supportive of treatment?  Yes  Community support group attendance: None  Recreational activities: spending time with her grandchildren   Narrative: Patient has support from her family and that her son calls her daily. Patient is not attending any sober support meetings at this time however has been given a meeting list and information, she has identified that as she steps down in treatment she would be more willing to try out sober support groups. 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  Patient reports no current legal concerns. She reports managing to find some time to focus on herself and found benefit in that. She reports she has been engaging in her hobbies and finding enjoyment in that. She stated she is looking forward to spending time with her family over the holiday weekend.     TOPIC  DATES   8 Dimensions of Wellness 9/6/21-9/10/21 & 11/8/21-11/12/21   Medical Aspects  9/13-9/16/21 & 11/15/21-11/19/21   Emotional Wellbeing/Mental Health  9/20-9/24/21, 11/22/21,   Acceptance  9/20/21-9/30/21 & 11/29   Relationships  10/4/21-10/8/21 & 12/5/21-12/10/21   Relapse Prevention  10/18/21-10/22/21, 10,25, 10/27, 10/28 & 12/20/21-12/24/21   Sober Structure  10/11/21-10/15/21 & 12/13/21-12/17/21   Grief and Loss  8/30/21-9/2/21 &11/1/21-11/5/21     Justification for Continued Treatment at this Level of Care: Patient is at moderate risk for continued use and relapse. Patient has pain and that leads to relapse historically. Patient is waiting for the first appointment for mental health services. She is gaining insight to relapse process.     Discharge Planning:  Target Discharge Date/Timeframe:  3/1/21   Med Mgmt Provider/Appt:  Daily due to MAT    Ind therapy Provider/Appt:  NA    Family therapy Provider/Appt:  NA   Other referrals:  Patient may need referral for pain clinic, PCP     Has vulnerable adult status  change? N/A    Service Coordination:  Patient is scheduled for regular 1x1 sessions.     Supervision:  Patient is staffed with treatment providers, this includes: STEPHANY York and STEPHANY Pierre.       Are Treatment Plan goals/objectives effective? Yes  *If no, list changes to treatment plan:    Are the current goals meeting client's needs? Yes  *If no, list the changes to treatment plan.    Client Input / Response: Agree      *Client agrees with any changes to the treatment plan: Yes  *Client received copy of changes: Yes  *Client is aware of right to access a treatment plan review: Yes

## 2021-12-27 ENCOUNTER — VIRTUAL VISIT (OUTPATIENT)
Dept: ADDICTION MEDICINE | Facility: CLINIC | Age: 56
End: 2021-12-27
Payer: MEDICARE

## 2021-12-27 DIAGNOSIS — F11.20 OPIOID USE DISORDER, SEVERE, DEPENDENCE (H): Primary | ICD-10-CM

## 2021-12-27 PROCEDURE — 90853 GROUP PSYCHOTHERAPY: CPT | Mod: PO

## 2021-12-27 NOTE — GROUP NOTE
Group Therapy Documentation    PATIENT'S NAME: Roger Fallon  MRN:   [id number]  :   1965  ACCT. NUMBER: [id number]  DATE OF SERVICE: 21  START TIME:  8:30 AM  END TIME: 11:30 AM  FACILITATOR(S): Nevin Monet LADC  TOPIC: BEH Group Therapy  Number of patients attending the group:  7  Group Length:  3 Hours    Group Therapy Type: Addiction and Psychoeducation    Summary of Group / Topics Discussed:    Cognitive Therapy Techniques, Coping Skills/Lifestyle Managemet, and Meditation/Breathing Exercises    Supervising MD: Dr. Garber     Video Visit:      Provider verified identity through the following two step process.  Patient provided:  Patient is known previously to provider and Patient was verified at admission/transfer    Telemedicine Visit: The patient's condition can be safely assessed and treated via synchronous audio and visual telemedicine encounter.      Reason for Telemedicine Visit: Services only offered telehealth    Originating Site (Patient Location): Patient's home    Distant Site (Provider Location): Provider Remote Setting- Home Office    Consent:  The patient/guardian has verbally consented to: the potential risks and benefits of telemedicine (video visit) versus in person care; bill my insurance or make self-payment for services provided; and responsibility for payment of non-covered services.     Patient would like the video invitation sent by:  Send to e-mail at: [email or web address]    Mode of Communication:  Video Conference via Medical Zoom    As the provider I attest to compliance with applicable laws and regulations related to telemedicine.    Group Attendance:  Attended group session    Patient's response to the group topic/interactions:  cooperative with task and discussed personal experience with topic    Patient appeared to be Actively participating, Attentive and Engaged.        Client specific details:  Patrick identified her personal self defeating recovery  behavior is boredom and self talk especially when she is experiencing pain. She identified over the weekend she was experiencing a lot of pain in her knee and states her knee is really swollen. She identified this lead to use over the weekend. We discussed a plan and I referred her to [facility] walk-in care.

## 2021-12-29 ENCOUNTER — DOCUMENTATION ONLY (OUTPATIENT)
Dept: ADDICTION MEDICINE | Facility: CLINIC | Age: 56
End: 2021-12-29
Payer: MEDICARE

## 2021-12-29 NOTE — PROGRESS NOTES
"Telephone call:    Patrick and I discussed her recent relapse with sober date of 12/27/21. After staffing patient with supervisor Pernell \"Doug\" Welander and co-facilitator Trista Best it has been determined with the patient that she will remain in senior recovery outpatient program with the expectation that she remain free from mood altering substances unless prescribed, move to phase 1 and attend a 1x1 weekly as well as sign a treatment contract and updated treatment plan. Patrick was in agreement to this and was pleased she was able to remain in programing. Patrick is also engaging in therapy and went today, she reports this was beneficial. She has an upcoming appointment on 1/11/22 to determine a surgery date for her knee. We will continue to monitor progress and refer to a higher level of care if needed/further use.    Nevin Monet, [facility] 12/29/2021 4:29 PM   "

## 2021-12-30 ENCOUNTER — VIRTUAL VISIT (OUTPATIENT)
Dept: ADDICTION MEDICINE | Facility: CLINIC | Age: 56
End: 2021-12-30
Payer: MEDICARE

## 2021-12-30 DIAGNOSIS — F11.20 OPIOID USE DISORDER, SEVERE, DEPENDENCE (H): Primary | ICD-10-CM

## 2021-12-30 PROCEDURE — 90853 GROUP PSYCHOTHERAPY: CPT | Mod: GT

## 2021-12-30 NOTE — GROUP NOTE
Group Therapy Documentation    PATIENT'S NAME: Roger Fallon  MRN:   [id number]  :   1965  ACCT. NUMBER: [id number]  DATE OF SERVICE: 21  START TIME:  8:30 AM  END TIME: 11:30 AM  FACILITATOR(S): Nevin Monet LADC  TOPIC: BEH Group Therapy  Number of patients attending the group:  6  Group Length:  3 Hours    Group Therapy Type: Addiction and Psychoeducation    Summary of Group / Topics Discussed:    Cognitive behavioral therapy skills, Mindfulness/Relaxation, and Relapse prevention    Supervising MD: Dr. Garber     Video Visit:      Provider verified identity through the following two step process.  Patient provided:  Patient is known previously to provider and Patient was verified at admission/transfer    Telemedicine Visit: The patient's condition can be safely assessed and treated via synchronous audio and visual telemedicine encounter.      Reason for Telemedicine Visit: Services only offered telehealth    Originating Site (Patient Location): Patient's home    Distant Site (Provider Location): Provider Remote Setting- Home Office    Consent:  The patient/guardian has verbally consented to: the potential risks and benefits of telemedicine (video visit) versus in person care; bill my insurance or make self-payment for services provided; and responsibility for payment of non-covered services.     Patient would like the video invitation sent by:  Send to e-mail at: [email or web address]    Mode of Communication:  Video Conference via Medical Zoom    As the provider I attest to compliance with applicable laws and regulations related to telemedicine.    Group Attendance:  Attended group session    Patient's response to the group topic/interactions:  cooperative with task    Patient appeared to be Actively participating, Attentive and Engaged.        Client specific details:  Patrick identified she needs to work on her recovery skills at this time focusing more on her relapse prevention and  coping skills. She identified stress an pain as her biggest triggers and stated she is going to look at her relapse prevention plan.

## 2021-12-31 ENCOUNTER — DOCUMENTATION ONLY (OUTPATIENT)
Dept: ADDICTION MEDICINE | Facility: CLINIC | Age: 56
End: 2021-12-31
Payer: MEDICARE

## 2021-12-31 NOTE — PROGRESS NOTES
[facility] Weekly Treatment Plan Review      ATTENDANCE for the following date span:  21-22    Date     Group Therapy 3    0 hours No group  hours No Group  hours 3 hours No group    Individual Therapy        Family Therapy        Other (Specify) Phase 1 &2  Phase 3  Phase 1  Phase 1 & 2        Patient had 0 absences during this time period.    Total hours: 103/108. Patient is in phase 2 but will begin phase 1 again on 1/3/22.     Weekly Treatment Plan Review     Treatment Plan initiated on: 21.    Dimension1: Acute Intoxication/Withdrawal Potential -   Previous Dimension Ratin  Current Dimension Ratin  Date of Last Use: 21  Any reports of withdrawal symptoms: Yes, Patient has withdrawal symptoms if she does not take her Methadone at scheduled time  Narrative: Patient reports date of last use of marijuana to be 21. Reported use on 21 & 21 as well as 21 &21, she reported use from 21-21. She is on Methadone and finding benefit in that. No withdrawal concerns endorsed, this writer did attempt to contact Cee from Methadone clinic to discuss their plan as they may decide to taper Patrick and she would be at increased risk for withdrawal symptoms.     Dimension 2: Biomedical Conditions & Complications -   Previous Dimension Ratin  Current Dimension Ratin  Medical Concerns:  Patient has bad knees, she also is being monitored by PCP for biomedical concerns.   Current Medications & Medication Changes:  Current Outpatient Medications   Medication     escitalopram (LEXAPRO) 20 MG tablet     METFORMIN HCL PO     METHADONE HCL PO     OXcarbazepine (TRILEPTAL) 300 MG tablet     No current facility-administered medications for this visit.     Medication Prescriber:  PCP and MAT   Taking meds as prescribed? Yes  Medication side effects or concerns:  Withdrawal from Methadone if not dosed at appropriate time.    Outside medical appointments this week (list provider and reason for visit):  She met with her new PCP   Narrative: Patient reports both knees are in poor condition. Patient identified a need for knee replacements. She identified she is working with providers to address her concerns. Patient identifies she struggles to slow down and take it easy on her self, we discussed self care and importance as her physical health is the trigger for her substance use. Patrick reports getting a Cortizone injection on 21 in her knees and was also seen at [facility] urgent care on 21 for another injection. She is hoping to be scheduled for knee surgery in the next 90 days. She has an appointment scheduled to meet the surgeon and set a date for surgery. She also reports her CPAP machine was recalled so she has not been using it for the last month but has contacted the medical products facility and they reported it was on backorder so she will get it in the new year.    Dimension 3: Emotional/Behavioral Conditions & Complications -   Previous Dimension Ratin  Current Dimension Ratin  PHQ9   No flowsheet data found.  GAD7   No flowsheet data found.  Mental health diagnosis None   Date of last SIB:  NA  Date of  last SI:  NA  Date of last HI: NA  Behavioral Targets:  Develop coping skills to manage grief and loss.   Current MH Assignments:  Engage in mindfulness   Narrative:  Patrick reports no mental health diagnoses, although does report experiencing significant grief and loss over the last year. Patrick was able to get an individual therapy session scheduled and has follow up appointments bi-weekly. Patient is continuing to work on developing healthy boundaries with her family so she can focus time on self care and her mental health. Overall she continues to endorse stability in her mental health and great benefit from therapy.She did report that although it is beneficial it has brought some memories from grief and  losses over the last couple of years so has been needing additional processing time with her provider. She also reported her cousin passed away over the last week from COVID-19.     Dimension 4: Treatment Acceptance / Resistance -   Previous Dimension Ratin  Current Dimension Ratin  OCTAVIO Diagnosis:  304.30 (F12.20) Cannabis Use Disorder Severe     Opioid Use Disorder, Specify if:  On a maintenance therapy with a severity of:  304.00 (F11.20) Severe  Stage: 2  Commitment to tx process/Stage of change: Action  OCTAVIO assignments:  Initial relapse prevention plan and goodbye letter.   Narrative: Patient is an active and engaged group member. Patient was placed on a treatment contract due to continued use and failure to attend group at the scheduled time. Patient is on alternate schedule where she leaves group at 9:00 AM and returns by 9:30 AM to bring her ryland to school. Patient is working on her goodbye letter. Patrick is currently being moved back to phase 1 due to recent use.      Dimension 5: Relapse / Continued Problem Potential -   Previous Dimension Rating:  3  Current Dimension Rating:  3  Relapses this week: Yes: Reports use of heroin due to pain and loss.   Urges to use:  None  UA results: No results found for this or any previous visit (from the past 168 hour(s)).  Narrative: Patient is developing coping skills and has been working on implementation of skills as well. She has also gained insight into what her triggers are. She identifies her use episodes are related to pain and lack of pain management as well as stress. She continues to work on her relapse prevention plan. She reports she is motivated for recovery and has a desire for abstinence so she is eligible for a knee replacement. Patient identified she is working on staying away from people who are historically triggering for her. She has identified stress, grief and loss and physical pain as her biggest triggers right now.      Dimension  6: Recovery Environment -   Previous Dimension Ratin  Current Dimension Ratin  Family Involvement : None    Summarize attendance at family groups and family sessions: NA  Family supportive of treatment?  Yes  Community support group attendance: None  Recreational activities: spending time with her grandchildren   Narrative: Patient has support from her family and that her son calls her daily. Patient is not attending any sober support meetings at this time however has been given a meeting list and information, she has identified that as she steps down in treatment she would be more willing to try out sober support groups. On her updated treatment contract she will be asked to attend 1 meeting per week.  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  Patient reports no current legal concerns. She reports managing to find some time to focus on herself and found benefit in that. She reports she has been engaging in her hobbies and finding enjoyment in that.    TOPIC  DATES   8 Dimensions of Wellness 21-9/10/21 & 21-21   Medical Aspects  -21 & 11/15/21-21   Emotional Wellbeing/Mental Health  -21, 21,   Acceptance  21-21 &    Relationships  10/4/21-10/8/21 & 21-12/10/21   Relapse Prevention  10/18/21-10/22/21, 10,25, 10/27, 10/28 & 21-21, 21-21   Sober Structure  10/11/21-10/15/21 & 21-21   Grief and Loss  21-21 &21-21     Justification for Continued Treatment at this Level of Care: Patient is at moderate risk for continued use and relapse. Patient has pain and that leads to relapse historically. Patient is waiting for the first appointment for mental health services. She is gaining insight to relapse process.     Discharge Planning:  Target Discharge Date/Timeframe:  3/1/21   Med Mgmt Provider/Appt:  Daily due to MAT    Ind therapy Provider/Appt:  NA    Family  therapy Provider/Appt:  NA   Other referrals:  Patient may need referral for pain clinic, PCP     Has vulnerable adult status change? N/A    Service Coordination:  Patient is scheduled for regular 1x1 sessions.     Supervision:  Patient is staffed with treatment providers, this includes: STEPHANY York and STEPHANY Pierre.       Are Treatment Plan goals/objectives effective? Yes  *If no, list changes to treatment plan:    Are the current goals meeting client's needs? Yes  *If no, list the changes to treatment plan.    Client Input / Response: Agree      *Client agrees with any changes to the treatment plan: Yes  *Client received copy of changes: Yes  *Client is aware of right to access a treatment plan review: Yes

## 2022-01-03 ENCOUNTER — DOCUMENTATION ONLY (OUTPATIENT)
Dept: ADDICTION MEDICINE | Facility: CLINIC | Age: 57
End: 2022-01-03
Payer: MEDICARE

## 2022-01-03 NOTE — PROGRESS NOTES
ABSENT NOTE:    Roger Fallon was absent from group 1/3/2022 . This absence was excused. Patient contacted counselor via email informing that he daughter was ill and Pt got home from the hospital early this morning.  Patient is expected to be in group on 01/05/2022.     STEPHANY Ocampo  1/3/2022 , 12:25 PM

## 2022-01-05 ENCOUNTER — VIRTUAL VISIT (OUTPATIENT)
Dept: ADDICTION MEDICINE | Facility: CLINIC | Age: 57
End: 2022-01-05
Payer: MEDICARE

## 2022-01-05 ENCOUNTER — DOCUMENTATION ONLY (OUTPATIENT)
Dept: ADDICTION MEDICINE | Facility: CLINIC | Age: 57
End: 2022-01-05
Payer: MEDICARE

## 2022-01-05 DIAGNOSIS — F11.20 OPIOID USE DISORDER, SEVERE, DEPENDENCE (H): Primary | ICD-10-CM

## 2022-01-05 PROCEDURE — 90853 GROUP PSYCHOTHERAPY: CPT | Mod: PO

## 2022-01-05 NOTE — GROUP NOTE
Group Therapy Documentation    PATIENT'S NAME: Roger Fallon  MRN:   [id number]  :   1965  ACCT. NUMBER: [id number]  DATE OF SERVICE: 22  START TIME:  8:30 AM  END TIME: 11:30 AM  FACILITATOR(S): Trista Best LADC; Nevin Monet LADC  TOPIC: BEH Group Therapy  Number of patients attending the group:  4  Group Length:  3 Hours    Group Therapy Type: Addiction and Psychoeducation    Summary of Group / Topics Discussed:    Cognitive Therapy Techniques, Coping Skills/Lifestyle Managemet, Choices in Recovery, Interpersonal Effectiveness, Meditation/Breathing Exercises, Mindfulness, Thinking Errors/Negative Self-Talk, and Self Reflection Recovery Questions.    Supervising MD: Dr. Garber     Video Visit:      Provider verified identity through the following two step process.  Patient provided:  Patient is known previously to provider and Patient was verified at admission/transfer    Telemedicine Visit: The patient's condition can be safely assessed and treated via synchronous audio and visual telemedicine encounter.      Reason for Telemedicine Visit: Services only offered telehealth    Originating Site (Patient Location): Patient's home    Distant Site (Provider Location): Provider Remote Setting- Home Office    Consent:  The patient/guardian has verbally consented to: the potential risks and benefits of telemedicine (video visit) versus in person care; bill my insurance or make self-payment for services provided; and responsibility for payment of non-covered services.     Patient would like the video invitation sent by:  Send to e-mail at: [email or web address]    Mode of Communication:  Video Conference via Medical Zoom    As the provider I attest to compliance with applicable laws and regulations related to telemedicine.  Group Attendance:  Attended group session    Patient's response to the group topic/interactions:  cooperative with task, discussed personal experience with topic,  expressed readiness to alter behaviors, expressed understanding of topic, gave appropriate feedback to peers and listened actively    Patient appeared to be Actively participating, Attentive and Engaged.        Client specific details:  Patrick discussed stress regarding taking care of her daughter after becoming very ill from a stomach virus and spending the night with her in the ER this week. She identified stress as being a trigger to use, though was able to stay sober. Patrick states that she missed her therapist appointment today due to being returned to phase 1 SRP and will call to reschedule for afternoon appointments. She participated in completing an in group worksheet and topic discussion on Self-reflection for Recovery.

## 2022-01-05 NOTE — PROGRESS NOTES
Addiction Outpatient Weekly Clinical Staffing     Roger Fallon was staffed on 1/5/2022 . Roger Fallon was staffed on recovery strengths, barriers and treatment progress.     Staff present: STEPHANY Pierre, STEPHANY Woodard, STEPHANY Bryant, Isabelle Hawk [facility], [facility] [place]note 2], Jesus Cabrera [facility] [place][note 2] and Trista Best [facility] [place][note 2]    Date: 1/5/2022 Time: 3:37 PM    Staff Signature: STEPHANY Kenny

## 2022-01-06 ENCOUNTER — VIRTUAL VISIT (OUTPATIENT)
Dept: ADDICTION MEDICINE | Facility: CLINIC | Age: 57
End: 2022-01-06
Payer: MEDICARE

## 2022-01-06 ENCOUNTER — DOCUMENTATION ONLY (OUTPATIENT)
Dept: ADDICTION MEDICINE | Facility: CLINIC | Age: 57
End: 2022-01-06
Payer: MEDICARE

## 2022-01-06 DIAGNOSIS — F11.20 OPIOID USE DISORDER, SEVERE, DEPENDENCE (H): Primary | ICD-10-CM

## 2022-01-06 PROCEDURE — 90853 GROUP PSYCHOTHERAPY: CPT | Mod: PO

## 2022-01-06 PROCEDURE — 90832 PSYTX W PT 30 MINUTES: CPT | Mod: GT

## 2022-01-06 NOTE — PROGRESS NOTES
1x1;   Time: 11:35-12:06      Today, this writer and co-counselor Nevin Monet met with patient for 31 min as part of recent increase in treatment sessions due to her relapsing on heroin from either 12/23 or 12/24/21-12/26/21. Also discussed updating previous treatment / abstinence agreement and will send it to her via [employer] to review and sign. Patrick was reminded that she is scheduled for weekly individual sessions until 1/27/22 at which time it will be determined if she will return to phase 2 SRP. Patrick verbalized her agreement to these terms. Other subjects discussed was her upcoming consultation on 1/11/22 with orthopedic surgeon. She reports the actual surgery will be performed at [facility] in [place]. Patrick describes that she has not been successful in her attempts to reach her [facility] counselor Cee by phone or in person yet, and will try to connect with her today when she goes there to dose. Counselors informed that we will also try to make contact sometime today or tomorrow. She also states that she will contact her therapist to reschedule her Wednesday sessions for afternoon so that she can she can attend SRP group sessions in the morning. Patrick is stating that she will be more proactive with her self-care to reduce severe knee and back issues which has typically led her back to using for pain relief.

## 2022-01-06 NOTE — GROUP NOTE
Group Therapy Documentation    PATIENT'S NAME: Roger Fallon  MRN:   [id number]  :   1965  ACCT. NUMBER: [id number]  DATE OF SERVICE: 22  START TIME:  8:30 AM  END TIME: 11:30 AM  FACILITATOR(S): Nevin Monet LADC; Trista Best LADC  TOPIC: BEH Group Therapy  Number of patients attending the group:  7  Group Length:  3 Hours    Group Therapy Type: Addiction and Psychoeducation    Summary of Group / Topics Discussed:    Cognitive Therapy Techniques, Communication, Coping Skills/Lifestyle Managemet, Emotional Regulation, Grief & Loss, Meditation/Breathing Exercises, and Mindfulness    Supervising MD: Dr. Garber     Video Visit:      Provider verified identity through the following two step process.  Patient provided:  Patient is known previously to provider and Patient was verified at admission/transfer    Telemedicine Visit: The patient's condition can be safely assessed and treated via synchronous audio and visual telemedicine encounter.      Reason for Telemedicine Visit: Services only offered telehealth    Originating Site (Patient Location): Patient's home    Distant Site (Provider Location): Provider Remote Setting- Home Office    Consent:  The patient/guardian has verbally consented to: the potential risks and benefits of telemedicine (video visit) versus in person care; bill my insurance or make self-payment for services provided; and responsibility for payment of non-covered services.     Patient would like the video invitation sent by:  Send to e-mail at: [email or web address]    Mode of Communication:  Video Conference via Medical Zoom    As the provider I attest to compliance with applicable laws and regulations related to telemedicine.    Group Attendance:  Attended group session    Patient's response to the group topic/interactions:  cooperative with task, discussed personal experience with topic, expressed readiness to alter behaviors, expressed understanding of topic and  listened actively    Patient appeared to be Actively participating, Attentive and Engaged.        Client specific details:  During Fam[employer] video on topic of grief and Loss, Patrick became noticeably emotional. She was open to processing her feelings regarding the loss of her parents, most recently her mother while stating that she keeps her mother's urn in her home where she can stay warm. She participated in the group discussion about the difference between moving on vs moving forward. Patrick describes her self-care this weekend will be buying rope to start [employer] projects which she finds to be therapeutic.

## 2022-01-07 ENCOUNTER — DOCUMENTATION ONLY (OUTPATIENT)
Dept: ADDICTION MEDICINE | Facility: CLINIC | Age: 57
End: 2022-01-07
Payer: MEDICARE

## 2022-01-07 NOTE — PROGRESS NOTES
Treatment Contract    Date: 1/7/2022      Name: Roger Fallon                  The staff at [facility] wants you to succeed in your substance use treatment program. Patient is being placed on a treatment contract due to concerns have prevented you from remaining compliant with group expectations.       o Failure to comply with group expectations:  - Substance use while enrolled in programing     The following conditions are required to engage in chemical dependency treatment at [facility]:  o Attend all groups on time: Monday, Wednesday & Thursday be in the group room by 8:30 am.     o Contact staff if you are unable to make it to group or will be late.     o Complete all assignments as requested- Relapse prevention plan     o Alert staff prior to leaving group    o Remain abstinent from all mood altering chemicals unless prescribed        Failure to comply with any of these conditions will result in an unfavorable discharge from the [facility] immediately.    If the above conditions are not met, I understand I will be discharged from treatment.      __________________  ______   _____________________       _______  Staff Signature        Date    Client Signature          Date    _________________     ______  Staff Signature   Date    o

## 2022-01-07 NOTE — PROGRESS NOTES
[facility] Weekly Treatment Plan Review      ATTENDANCE for the following date span:  1/3/22-22    Date     Group Therapy No group hours No group  hours 3 hours 3 hours No group    Individual Therapy        Family Therapy        Other (Specify) Phase 1 &2  Phase 3  Phase 1  Phase 1 & 2    Patrick also has a weekly 1x1 on this day with OP staff.         Patient had 1 absences during this time period. 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     Total hours: 110/108. See dimension 4 for extended programing description     Weekly Treatment Plan Review     Treatment Plan initiated on: 21.    Dimension1: Acute Intoxication/Withdrawal Potential -   Previous Dimension Ratin  Current Dimension Ratin  Date of Last Use: 21  Any reports of withdrawal symptoms: Yes, Patient has withdrawal symptoms if she does not take her Methadone at scheduled time  Narrative: Patient reports date of last use of marijuana to be 21. Reported use on 21 & 21 as well as 21 &21, she reported use from 21-21. She is on Methadone and finding benefit in that. Patrick reports sustained abstinence over the last week with no withdrawal concerns.     Dimension 2: Biomedical Conditions & Complications -   Previous Dimension Ratin  Current Dimension Ratin  Medical Concerns:  Patient has bad knees, she also is being monitored by PCP for biomedical concerns.   Current Medications & Medication Changes:  Current Outpatient Medications   Medication     escitalopram (LEXAPRO) 20 MG tablet     METFORMIN HCL PO     METHADONE HCL PO     OXcarbazepine (TRILEPTAL) 300 MG tablet     No current facility-administered medications for this visit.     Medication Prescriber:  PCP and MAT   Taking meds as prescribed? Yes  Medication side effects or concerns:  Withdrawal from Methadone if not dosed at appropriate  time.   Outside medical appointments this week (list provider and reason for visit):  None    Narrative: Patient reports both knees are in poor condition. Patient identified a need for knee replacements. She is working with providers to address her concerns and has an appointment with a surgeon on 22 to discuss surgery. Patient identifies she struggles to slow down and take it easy on her self, we discussed self care and importance as her physical health is the trigger for her substance use. Patrick's CPAP is still on recall with no replacement so she is not currently using that but not endorsing major sleep problems. She did report going up and down the stairs a bit over the last week and finding benefit in keeping herself moving.     Dimension 3: Emotional/Behavioral Conditions & Complications -   Previous Dimension Ratin  Current Dimension Ratin  PHQ9   No flowsheet data found.  GAD7   No flowsheet data found.  Mental health diagnosis None   Date of last SIB:  NA  Date of  last SI:  NA  Date of last HI: NA  Behavioral Targets:  Develop coping skills to manage grief and loss.   Current MH Assignments:  Engage in mindfulness   Narrative:  Patrick has experienced significant grief and loss over the last year with her nephew, father and most recently her cousin. Patrick is seeing an individual therapist and finding benefit in that. She reports her sessions are bi-weekly and she is working to adjust her schedule with her therapist as she had an increase in frequency of OP OCTAVIO services with us so it is conflicting. She continues to work on healthy boundaries with her peers and family and finds that helpful as she is able to advocate for her needs without being taken advantage of by her children. Overall she is reporting stable mental health with the exception of moments of grief and sadness.     Dimension 4: Treatment Acceptance / Resistance -   Previous Dimension Ratin  Current Dimension Ratin  OCTAVIO  Diagnosis:  304.30 (F12.20) Cannabis Use Disorder Severe     Opioid Use Disorder, Specify if:  On a maintenance therapy with a severity of:  304.00 (F11.20) Severe  Stage: 1  Commitment to tx process/Stage of change: Action  OCTAVIO assignments:  Initial relapse prevention plan and goodbye letter.   Narrative: Patrick was previously in phase 2 however due to frequent relapses has now moved back to phase 1 (3x weekly groups) with an additional 1x1 scheduled on Thursday's after group until 2022. She was agreeable to this schedule change. Patrick reports motivation for outpatient programing and has been advocating for her needs more over the last week. She is on a treatment contract and that will be updated to reflect her most recent use.      Dimension 5: Relapse / Continued Problem Potential -   Previous Dimension Rating:  3  Current Dimension Rating:  3  Relapses this week: None over the last week   Urges to use:  None  UA results: No results found for this or any previous visit (from the past 168 hour(s)).  Narrative: Patrick is working on identifying triggers, she initially identified pain as a primary trigger however over the last month it has been identified that she has other triggers as well and few coping skills to manage. She has some ideas of what skills she can use however struggles to implement her skills. She is working on her relapse prevention plan with a target date of completion to be 22. One of Patrick's triggers was access to substances, she reported to this writer she was going to delete her dealers number so access would be more difficult.     Dimension 6: Recovery Environment -   Previous Dimension Ratin  Current Dimension Ratin  Family Involvement : None    Summarize attendance at family groups and family sessions: NA  Family supportive of treatment?  Yes  Community support group attendance: None  Recreational activities: spending time with her grandchildren   Narrative: Patient has support  from her family and that her son calls her daily. Patient is not attending any sober support meetings, however made a committment to attend 1 meeting by 1/12/22. 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  Patient reports no current legal concerns. She reports managing to find some time to focus on herself and found benefit in that. She reports she has been engaging in her hobbies and finding enjoyment in that.    TOPIC  DATES   8 Dimensions of Wellness 9/6/21-9/10/21 & 11/8/21-11/12/21   Medical Aspects  9/13-9/16/21 & 11/15/21-11/19/21   Emotional Wellbeing/Mental Health  9/20-9/24/21, 11/22/21,   Acceptance  9/20/21-9/30/21 & 11/29   Relationships  10/4/21-10/8/21 & 12/5/21-12/10/21   Relapse Prevention  10/18/21-10/22/21, 10,25, 10/27, 10/28 & 12/20/21-12/24/21, 12/27/21-12/31/21   Sober Structure  10/11/21-10/15/21 & 12/13/21-12/17/21   Grief and Loss  8/30/21-9/2/21 &11/1/21-11/5/21 & 1/3/22-1/7/22     Justification for Continued Treatment at this Level of Care: Patient is at high risk for continued use and relapse. Patient has pain and that leads to relapse historically. She is currently in individual therapy. In phase 1 with additional 1x1 each week. She is gaining insight to relapse process.     Discharge Planning:  Target Discharge Date/Timeframe:  3/1/21   Med Mgmt Provider/Appt:  Daily due to MAT    Ind therapy Provider/Appt:  NA    Family therapy Provider/Appt:  NA   Other referrals:  Patient may need referral for pain clinic, PCP     Has vulnerable adult status change? N/A    Service Coordination:  Patient is scheduled for regular 1x1 sessions.     Supervision:  Patient is staffed with treatment providers, this includes: STEPHANY York and STEPHANY Pierre.       Are Treatment Plan goals/objectives effective? Yes  *If no, list changes to treatment plan:    Are the current goals meeting client's needs? Yes  *If no, list the changes to treatment plan.    Client  Input / Response: Agree      *Client agrees with any changes to the treatment plan: Yes  *Client received copy of changes: Yes  *Client is aware of right to access a treatment plan review: Yes

## 2022-01-10 ENCOUNTER — VIRTUAL VISIT (OUTPATIENT)
Dept: ADDICTION MEDICINE | Facility: CLINIC | Age: 57
End: 2022-01-10
Payer: MEDICARE

## 2022-01-10 DIAGNOSIS — F11.20 OPIOID USE DISORDER, SEVERE, DEPENDENCE (H): Primary | ICD-10-CM

## 2022-01-10 PROCEDURE — 90853 GROUP PSYCHOTHERAPY: CPT | Mod: GT

## 2022-01-10 NOTE — GROUP NOTE
Group Therapy Documentation    PATIENT'S NAME: Roger Fallon  MRN:   [id number]  :   1965  ACCT. NUMBER: [id number]  DATE OF SERVICE: 1/10/22  START TIME:  8:30 AM  END TIME: 11:30 AM  FACILITATOR(S): Nevin Monet LADC  TOPIC: BEH Group Therapy  Number of patients attending the group:  5  Group Length:  3 Hours    Group Therapy Type: Addiction and Psychoeducation    Summary of Group / Topics Discussed:    Balanced Lifestyle , Cognitive Therapy Techniques, Coping Skills/Lifestyle Managemet, and Meditation/Breathing Exercises    Supervising MD: Dr. Garber     Video Visit:      Provider verified identity through the following two step process.  Patient provided:  Patient is known previously to provider and Patient was verified at admission/transfer    Telemedicine Visit: The patient's condition can be safely assessed and treated via synchronous audio and visual telemedicine encounter.      Reason for Telemedicine Visit: Services only offered telehealth    Originating Site (Patient Location): Patient's home    Distant Site (Provider Location): Provider Remote Setting- Home Office    Consent:  The patient/guardian has verbally consented to: the potential risks and benefits of telemedicine (video visit) versus in person care; bill my insurance or make self-payment for services provided; and responsibility for payment of non-covered services.     Patient would like the video invitation sent by:  Send to e-mail at: [email or web address]    Mode of Communication:  Video Conference via Medical Zoom    As the provider I attest to compliance with applicable laws and regulations related to telemedicine.    Group Attendance:  Attended group session    Patient's response to the group topic/interactions:  cooperative with task and discussed personal experience with topic    Patient appeared to be Actively participating, Attentive and Engaged.        Client specific details:  Patrick identified she is working on  her mental wellbeing currently. She is doing this by engaging in individual therapy and attending groups. Patrick expressed she is spending more time focusing on herself rather than other people and their needs.

## 2022-01-12 ENCOUNTER — VIRTUAL VISIT (OUTPATIENT)
Dept: ADDICTION MEDICINE | Facility: CLINIC | Age: 57
End: 2022-01-12
Payer: MEDICARE

## 2022-01-12 VITALS — WEIGHT: 244 LBS | BODY MASS INDEX: 47.91 KG/M2 | HEIGHT: 60 IN

## 2022-01-12 DIAGNOSIS — F11.20 OPIOID USE DISORDER, SEVERE, DEPENDENCE (H): Primary | ICD-10-CM

## 2022-01-12 PROCEDURE — 90853 GROUP PSYCHOTHERAPY: CPT | Mod: GT

## 2022-01-12 NOTE — GROUP NOTE
Group Therapy Documentation    PATIENT'S NAME: Roger Fallon  MRN:   [id number]  :   1965  ACCT. NUMBER: [id number]  DATE OF SERVICE: 22  START TIME:  8:30 AM  END TIME: 11:30 AM  FACILITATOR(S): [facility]et [facility]; Trista Best LADC  TOPIC: BEH Group Therapy  Number of patients attending the group:  5  Group Length:  3 Hours    Group Therapy Type: Addiction and Psychoeducation    Summary of Group / Topics Discussed:    Balanced Lifestyle , Cognitive Therapy Techniques, Coping Skills/Lifestyle Managemet, Choices in Recovery, Interpersonal Effectiveness, Meditation/Breathing Exercises, Mindfulness, Proper Nutrition & Exercise, and Self-Care Activities    Supervising MD: Dr. Garber     Video Visit:      Provider verified identity through the following two step process.  Patient provided:  Patient is known previously to provider and Patient was verified at admission/transfer    Telemedicine Visit: The patient's condition can be safely assessed and treated via synchronous audio and visual telemedicine encounter.      Reason for Telemedicine Visit: Services only offered telehealth    Originating Site (Patient Location): Patient's home    Distant Site (Provider Location): Provider Remote Setting- Home Office    Consent:  The patient/guardian has verbally consented to: the potential risks and benefits of telemedicine (video visit) versus in person care; bill my insurance or make self-payment for services provided; and responsibility for payment of non-covered services.     Patient would like the video invitation sent by:  Send to e-mail at: [email or web address]    Mode of Communication:  Video Conference via Medical Zoom    As the provider I attest to compliance with applicable laws and regulations related to telemedicine.    Group Attendance:  Attended group session    Patient's response to the group topic/interactions:  cooperative with task, discussed personal experience with topic,  expressed understanding of topic, gave appropriate feedback to peers and listened actively    Patient appeared to be Actively participating, Attentive and Engaged.        Client specific details:  Patrick discussed her appointment for consultation for knee surgery on 1/11/22 was productive in that the first knee replacement surgery is anticipated to occur in June 2022.  She requested to be sent links for sober support meetings as she is having difficulty connecting to certain zoom sites. Counselor Nevin Monet recommended she visit [facility] [employer] [facility] site for easier access and variety. Patrick shared that she may fly to [place] for the weekend to be with one of her daughters following surgery.

## 2022-01-13 ENCOUNTER — VIRTUAL VISIT (OUTPATIENT)
Dept: ADDICTION MEDICINE | Facility: CLINIC | Age: 57
End: 2022-01-13
Payer: MEDICARE

## 2022-01-13 ENCOUNTER — TELEPHONE (OUTPATIENT)
Dept: ADDICTION MEDICINE | Facility: CLINIC | Age: 57
End: 2022-01-13
Payer: MEDICARE

## 2022-01-13 DIAGNOSIS — F11.20 OPIOID USE DISORDER, SEVERE, DEPENDENCE (H): Primary | ICD-10-CM

## 2022-01-13 PROCEDURE — 90853 GROUP PSYCHOTHERAPY: CPT | Mod: PO

## 2022-01-13 NOTE — GROUP NOTE
Group Therapy Documentation    PATIENT'S NAME: Roger Fallon  MRN:   [id number]  :   1965  ACCT. NUMBER: [id number]  DATE OF SERVICE: 22  START TIME:  8:30 AM  END TIME: 11:30 AM  FACILITATOR(S): Nevin Monet LADC  TOPIC: BEH Group Therapy  Number of patients attending the group:  5  Group Length:  3 Hours    Group Therapy Type: Addiction and Psychoeducation    Summary of Group / Topics Discussed:    Balanced Lifestyle , Cognitive Therapy Techniques, Meditation/Breathing Exercises, and 8 dimensions of wellness.     Supervising MD: Dr. Garber     Video Visit:      Provider verified identity through the following two step process.  Patient provided:  Patient is known previously to provider and Patient was verified at admission/transfer    Telemedicine Visit: The patient's condition can be safely assessed and treated via synchronous audio and visual telemedicine encounter.      Reason for Telemedicine Visit: Services only offered telehealth    Originating Site (Patient Location): Patient's home    Distant Site (Provider Location): Provider Remote Setting- Home Office    Consent:  The patient/guardian has verbally consented to: the potential risks and benefits of telemedicine (video visit) versus in person care; bill my insurance or make self-payment for services provided; and responsibility for payment of non-covered services.     Patient would like the video invitation sent by:  Send to e-mail at: [email or web address]    Mode of Communication:  Video Conference via Medical Zoom    As the provider I attest to compliance with applicable laws and regulations related to telemedicine.    Group Attendance:  Attended group session    Patient's response to the group topic/interactions:  cooperative with task and discussed personal experience with topic    Patient appeared to be Actively participating, Attentive and Engaged.        Client specific details:  Patrick discussed her pain management  techniques today and processed her discussion with her therapist regarding pain. Patrick has been mindful of pain as that is one of her biggest triggers as well as the stress that goes along with it. She continues to work on balance and purpose and we also discussed that today during the 8 dimensions of wellness.

## 2022-01-14 ENCOUNTER — DOCUMENTATION ONLY (OUTPATIENT)
Dept: ADDICTION MEDICINE | Facility: CLINIC | Age: 57
End: 2022-01-14
Payer: MEDICARE

## 2022-01-14 NOTE — PROGRESS NOTES
[facility] Weekly Treatment Plan Review      ATTENDANCE for the following date span:  1/10/22-22    Date     Group Therapy 3 hours No group  hours 3 hours 3 hours No group    Individual Therapy        Family Therapy        Other (Specify) Phase 1 &2  Phase 3  Phase 1  Phase 1 & 2    Patrick also has a weekly 1x1 on this day with OP staff.         Patient had 0 absences during this time period.     Total hours: 119/108. See dimension 4 for extended programing description     Weekly Treatment Plan Review     Treatment Plan initiated on: 21.    Dimension1: Acute Intoxication/Withdrawal Potential -   Previous Dimension Ratin  Current Dimension Ratin  Date of Last Use: 21  Any reports of withdrawal symptoms: Yes, Patient has withdrawal symptoms if she does not take her Methadone at scheduled time  Narrative: Patient reports date of last use of marijuana to be 21. Reported use on 21 & 21 as well as 21 &21, she reported use from 21-21. She is on Methadone and finding benefit in that. Patrick reports sustained abstinence over the last week with no withdrawal concerns.     Dimension 2: Biomedical Conditions & Complications -   Previous Dimension Ratin  Current Dimension Ratin  Medical Concerns:  Patient has bad knees, she also is being monitored by PCP for biomedical concerns.   Current Medications & Medication Changes:  Current Outpatient Medications   Medication     escitalopram (LEXAPRO) 20 MG tablet     METFORMIN HCL PO     METHADONE HCL PO     OXcarbazepine (TRILEPTAL) 300 MG tablet     No current facility-administered medications for this visit.     Medication Prescriber:  PCP and MAT   Taking meds as prescribed? Yes  Medication side effects or concerns:  Withdrawal from Methadone if not dosed at appropriate time.   Outside medical appointments this week (list provider and reason for visit):  None     Narrative: Patient reports both knees are in poor condition. Patient identified a need for knee replacements. She is working with providers to address her concerns and has met with a surgeon, tentative surgery date is in 2022. In the internum she is working on strengthening her knees by doing physical therapy exercises so she is ready for surgery when it available. Patrick's CPAP is still on recall with no replacement so she is not currently using that but not endorsing major sleep problems.     Dimension 3: Emotional/Behavioral Conditions & Complications -   Previous Dimension Ratin  Current Dimension Ratin  PHQ9   No flowsheet data found.  GAD7   No flowsheet data found.  Mental health diagnosis None   Date of last SIB:  NA  Date of  last SI:  NA  Date of last HI: NA  Behavioral Targets:  Develop coping skills to manage grief and loss.   Current MH Assignments:  Engage in mindfulness   Narrative:  Patrick has experienced significant grief and loss over the last year with her nephew, father and most recently her cousin. Patrick is seeing an individual therapist and finding benefit in that. She reports her sessions are bi-weekly. She continues to work on healthy boundaries with her peers and family and finds that helpful as she is able to advocate for her needs. Overall she is reporting stable mental health with the exception of moments of grief and sadness. She identified that she has been feeling more and more positive.     Dimension 4: Treatment Acceptance / Resistance -   Previous Dimension Ratin  Current Dimension Ratin  OCTAVIO Diagnosis:  304.30 (F12.20) Cannabis Use Disorder Severe     Opioid Use Disorder, Specify if:  On a maintenance therapy with a severity of:  304.00 (F11.20) Severe  Stage: 1  Commitment to tx process/Stage of change: Action  OCTAVIO assignments:  Initial relapse prevention plan and goodbye letter.   Narrative: Patrick was previously in phase 2 however due to frequent relapses has  now moved back to phase 1 (3x weekly groups) with an additional 1x1 scheduled on Thursday's after group until 2022. She was agreeable to this schedule change. Patrick reports motivation for outpatient programing and continues to work on advocating for her needs. She is on a treatment contract.    Dimension 5: Relapse / Continued Problem Potential -   Previous Dimension Rating:  3  Current Dimension Rating:  3  Relapses this week: None over the last week   Urges to use:  None  UA results: No results found for this or any previous visit (from the past 168 hour(s)).  Narrative: Patrick is working on identifying triggers, she initially identified pain as a primary trigger however over the last month it has been identified that she has other triggers as well and few coping skills to manage. She has some ideas of what skills she can use however struggles to implement her skills. She is working on her relapse prevention plan with a target date of completion to be 22. Patrick reported she has been using some deep breathing skills and working on crafting projects as coping skills and ways to keep herself busy.     Dimension 6: Recovery Environment -   Previous Dimension Ratin  Current Dimension Ratin  Family Involvement : None    Summarize attendance at family groups and family sessions: NA  Family supportive of treatment?  Yes  Community support group attendance: None  Recreational activities: spending time with her grandchildren   Narrative: Patient has support from her family and that her son calls her daily. Patient is not attending any sober support meetings, however made a committment to attend 1 meeting by 22, she has been provided with meeting resources. 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  Patient reports no current legal concerns. No major changes over the last week.     TOPIC  DATES   8 Dimensions of Wellness 21-9/10/21 & 21-21 & 1/10/22-22    Medical Aspects  9/13-9/16/21 & 11/15/21-11/19/21   Emotional Wellbeing/Mental Health  9/20-9/24/21, 11/22/21,   Acceptance  9/20/21-9/30/21 & 11/29   Relationships  10/4/21-10/8/21 & 12/5/21-12/10/21   Relapse Prevention  10/18/21-10/22/21, 10,25, 10/27, 10/28 & 12/20/21-12/24/21, 12/27/21-12/31/21   Sober Structure  10/11/21-10/15/21 & 12/13/21-12/17/21   Grief and Loss  8/30/21-9/2/21 &11/1/21-11/5/21 & 1/3/22-1/7/22     Justification for Continued Treatment at this Level of Care: Patient is at high risk for continued use and relapse. Patient has pain and that leads to relapse historically. She is currently in individual therapy. In phase 1 with additional 1x1 each week. She is gaining insight to relapse process.     Discharge Planning:  Target Discharge Date/Timeframe:  3/1/21   Med Mgmt Provider/Appt:  Daily due to MAT    Ind therapy Provider/Appt:  NA    Family therapy Provider/Appt:  NA   Other referrals:  Patient may need referral for pain clinic, PCP     Has vulnerable adult status change? N/A    Service Coordination:  Patient is scheduled for regular 1x1 sessions.     Supervision:  Patient is staffed with treatment providers, this includes: STEPHANY York and STEPHANY Pierre.       Are Treatment Plan goals/objectives effective? Yes  *If no, list changes to treatment plan:    Are the current goals meeting client's needs? Yes  *If no, list the changes to treatment plan.    Client Input / Response: Agree      *Client agrees with any changes to the treatment plan: Yes  *Client received copy of changes: Yes  *Client is aware of right to access a treatment plan review: Yes

## 2022-01-14 NOTE — PROGRESS NOTES
Telephone encounter note from call discussion on 1/13/22 with Daniel counselor, Cee for patient coordination of care services.      Spoke with counselor for check-in and update PT progress. Cee said she met w/Pt yesterday 1/12/22 and was informed that she was placed back in Ph 1 SRP due to relapse over [place] holiday. I informed her that counselor Nevin Monet staffed our concerns with our manager Bill Welander about discharging her to a [facility] at the time due to there being no openings for Medicare inpatient or residential OCTAVIO in [place] in the near future. Discussed Pt's current progress as appearing positive, and ways to encourage her to hold herself more accountable for her recovery, and to be more proactive with sobriety maintenance. Cee informed that Pt reported to her that she changed her bedroom around and disposed of all paraphernalia she found. Discussed Pt s family as being important to her and that they are supportive, though uncertainty if there is SOSA within her family. Discussed some elements of possible historical trauma that we each hope is being addressed with her MH therapist. Discussed continuing to coordinate services on behalf of Patrick. Cee suggested that patient may benefit from receiving family counseling and I agreed. I informed  her that I will discuss this with counselor Nevin Monet decide on when to schedule an initial family session with Pt and one or more of her adult children to start a dialogue on what is needed for support. We agreed to continue to check-in with each other again soon.

## 2022-01-17 ENCOUNTER — VIRTUAL VISIT (OUTPATIENT)
Dept: ADDICTION MEDICINE | Facility: CLINIC | Age: 57
End: 2022-01-17
Payer: MEDICARE

## 2022-01-17 DIAGNOSIS — F11.20 OPIOID USE DISORDER, SEVERE, DEPENDENCE (H): Primary | ICD-10-CM

## 2022-01-17 PROCEDURE — 90853 GROUP PSYCHOTHERAPY: CPT | Mod: PO

## 2022-01-17 NOTE — GROUP NOTE
Group Therapy Documentation    PATIENT'S NAME: Roger Fallon  MRN:   [id number]  :   1965  ACCT. NUMBER: [id number]  DATE OF SERVICE: 22  START TIME:  8:30 AM  END TIME: 11:30 AM  FACILITATOR(S): Nevin Monet LADC  TOPIC: BEH Group Therapy  Number of patients attending the group:  4  Group Length:  3 Hours    Group Therapy Type: Addiction and Psychoeducation    Summary of Group / Topics Discussed:    Balanced Lifestyle , Cognitive Therapy Techniques, and Meditation/Breathing Exercises    Supervising MD: Dr. Garber     Video Visit:      Provider verified identity through the following two step process.  Patient provided:  Patient is known previously to provider and Patient was verified at admission/transfer    Telemedicine Visit: The patient's condition can be safely assessed and treated via synchronous audio and visual telemedicine encounter.      Reason for Telemedicine Visit: Services only offered telehealth    Originating Site (Patient Location): Patient's home    Distant Site (Provider Location): Provider Remote Setting- Home Office    Consent:  The patient/guardian has verbally consented to: the potential risks and benefits of telemedicine (video visit) versus in person care; bill my insurance or make self-payment for services provided; and responsibility for payment of non-covered services.     Patient would like the video invitation sent by:  Send to e-mail at: [email or web address]    Mode of Communication:  Video Conference via Medical Zoom    As the provider I attest to compliance with applicable laws and regulations related to telemedicine.    Group Attendance:  Attended group session    Patient's response to the group topic/interactions:  cooperative with task and discussed personal experience with topic    Patient appeared to be Actively participating, Attentive and Engaged.        Client specific details:  Patrick expressed her gratitude for her recovery and physical abilities  at this time. She reported her mental nina is in a positive space and because of that she has been able to express and allow her feelings without trying to suppress them.

## 2022-01-19 ENCOUNTER — VIRTUAL VISIT (OUTPATIENT)
Dept: ADDICTION MEDICINE | Facility: CLINIC | Age: 57
End: 2022-01-19
Payer: MEDICARE

## 2022-01-19 DIAGNOSIS — F11.20 OPIOID USE DISORDER, SEVERE, DEPENDENCE (H): Primary | ICD-10-CM

## 2022-01-19 PROCEDURE — 90853 GROUP PSYCHOTHERAPY: CPT | Mod: GT

## 2022-01-19 NOTE — GROUP NOTE
Group Therapy Documentation    PATIENT'S NAME: Roger Fallon  MRN:   [id number]  :   1965  ACCT. NUMBER: [id number]  DATE OF SERVICE: 22  START TIME:  8:30 AM  END TIME: 11:30 AM  FACILITATOR(S): Nevin Monet LADC  TOPIC: BEH Group Therapy  Number of patients attending the group:  3  Group Length:  3 Hours    Group Therapy Type: Addiction and Psychoeducation    Summary of Group / Topics Discussed:    Balanced Lifestyle , Cognitive Therapy Techniques, and Meditation/Breathing Exercises    Supervising MD: Dr. Garber     Video Visit:      Provider verified identity through the following two step process.  Patient provided:  Patient is known previously to provider and Patient was verified at admission/transfer    Telemedicine Visit: The patient's condition can be safely assessed and treated via synchronous audio and visual telemedicine encounter.      Reason for Telemedicine Visit: Services only offered telehealth    Originating Site (Patient Location): Patient's home    Distant Site (Provider Location): Provider Remote Setting- Home Office    Consent:  The patient/guardian has verbally consented to: the potential risks and benefits of telemedicine (video visit) versus in person care; bill my insurance or make self-payment for services provided; and responsibility for payment of non-covered services.     Patient would like the video invitation sent by:  Send to e-mail at: [email or web address]    Mode of Communication:  Video Conference via Medical Zoom    As the provider I attest to compliance with applicable laws and regulations related to telemedicine.    Group Attendance:  Attended group session    Patient's response to the group topic/interactions:  cooperative with task and discussed personal experience with topic    Patient appeared to be Actively participating, Attentive and Engaged.        Client specific details:  Patrick expressed her strengths at this time are listening and being a  problem solver, she identified these strengths as being beneficial especially as she has begun attending sober support meetings and having the ability to hear with others are saying and connecting with that.

## 2022-01-20 ENCOUNTER — VIRTUAL VISIT (OUTPATIENT)
Dept: ADDICTION MEDICINE | Facility: CLINIC | Age: 57
End: 2022-01-20
Payer: MEDICARE

## 2022-01-20 DIAGNOSIS — F11.20 OPIOID USE DISORDER, SEVERE, DEPENDENCE (H): Primary | ICD-10-CM

## 2022-01-20 PROCEDURE — 90853 GROUP PSYCHOTHERAPY: CPT | Mod: PO

## 2022-01-20 NOTE — GROUP NOTE
Group Therapy Documentation    PATIENT'S NAME: Roger Fallon  MRN:   [id number]  :   1965  ACCT. NUMBER: [id number]  DATE OF SERVICE: 22  START TIME:  8:30 AM  END TIME: 11:30 AM  FACILITATOR(S): Nevin Monet LADC  TOPIC: BEH Group Therapy  Number of patients attending the group:  4  Group Length:  3 Hours    Group Therapy Type: Addiction and Psychoeducation    Summary of Group / Topics Discussed:    Balanced Lifestyle , Cognitive Therapy Techniques, and Meditation/Breathing Exercises    Supervising MD: Dr. Garber     Video Visit:      Provider verified identity through the following two step process.  Patient provided:  Patient is known previously to provider and Patient was verified at admission/transfer    Telemedicine Visit: The patient's condition can be safely assessed and treated via synchronous audio and visual telemedicine encounter.      Reason for Telemedicine Visit: Services only offered telehealth    Originating Site (Patient Location): Patient's home    Distant Site (Provider Location): Provider Remote Setting- Home Office    Consent:  The patient/guardian has verbally consented to: the potential risks and benefits of telemedicine (video visit) versus in person care; bill my insurance or make self-payment for services provided; and responsibility for payment of non-covered services.     Patient would like the video invitation sent by:  Send to e-mail at: [email or web address]    Mode of Communication:  Video Conference via Medical Zoom    As the provider I attest to compliance with applicable laws and regulations related to telemedicine.    Group Attendance:  Attended group session    Patient's response to the group topic/interactions:  cooperative with task and discussed personal experience with topic    Patient appeared to be Actively participating, Attentive and Engaged.        Client specific details:  Patrick identified a desire to begin working on her emotional health and  wellbeing. She stated her emotional health is important as she takes on the stressors and emotions her family shares with her. She was thoughtful in her insight as to how she can manage those emotions and family stressors.

## 2022-01-21 ENCOUNTER — DOCUMENTATION ONLY (OUTPATIENT)
Dept: ADDICTION MEDICINE | Facility: CLINIC | Age: 57
End: 2022-01-21
Payer: MEDICARE

## 2022-01-21 NOTE — PROGRESS NOTES
[facility] Weekly Treatment Plan Review      ATTENDANCE for the following date span:  22-22    Date     Group Therapy 3 hours No group  hours 3 hours 3 hours No group    Individual Therapy        Family Therapy        Other (Specify) Phase 1 &2  Phase 3  Phase 1  Phase 1 & 2    Patrick also has a weekly 1x1 on this day with OP staff.         Patient had 0 absences during this time period.     Total hours: 128/108. See dimension 4 for extended programing description     Weekly Treatment Plan Review     Treatment Plan initiated on: 21.    Dimension1: Acute Intoxication/Withdrawal Potential -   Previous Dimension Ratin  Current Dimension Ratin  Date of Last Use: 21  Any reports of withdrawal symptoms: Yes, Patient has withdrawal symptoms if she does not take her Methadone at scheduled time  Narrative: Patient reports date of last use of marijuana to be 21. Reported use on 21 & 21 as well as 21 &21, she reported use from 21-21. She is on Methadone and finding benefit in that. Patrick reports sustained abstinence over the last week. No changes.      Dimension 2: Biomedical Conditions & Complications -   Previous Dimension Ratin  Current Dimension Ratin  Medical Concerns:  Patient has bad knees, she also is being monitored by PCP for biomedical concerns.   Current Medications & Medication Changes:  Current Outpatient Medications   Medication     escitalopram (LEXAPRO) 20 MG tablet     METFORMIN HCL PO     METHADONE HCL PO     OXcarbazepine (TRILEPTAL) 300 MG tablet     No current facility-administered medications for this visit.     Medication Prescriber:  PCP and MAT   Taking meds as prescribed? Yes  Medication side effects or concerns:  Withdrawal from Methadone if not dosed at appropriate time.   Outside medical appointments this week (list provider and reason for visit):  None    Narrative: Patient  reports both knees are in poor condition. Patient identified a need for knee replacements. She is working with providers to address her concerns and has met with a surgeon, tentative surgery date is in 2022. In the internum she is working on strengthening her knees by doing physical therapy exercises so she is ready for surgery when it available. Patrick's CPAP is still on recall with no replacement so she is not currently using that but not endorsing major sleep problems. Overall Patrick is reporting stable physical health with no concerns.     Dimension 3: Emotional/Behavioral Conditions & Complications -   Previous Dimension Ratin  Current Dimension Ratin  PHQ9   No flowsheet data found.  GAD7   No flowsheet data found.  Mental health diagnosis None   Date of last SIB:  NA  Date of  last SI:  NA  Date of last HI: NA  Behavioral Targets:  Develop coping skills to manage grief and loss.   Current MH Assignments:  Engage in mindfulness   Narrative:  Patrick has experienced significant grief and loss over the last year with her nephew, father and most recently her cousin. Patrick is seeing an individual therapist and finding benefit in that. She reports her sessions are bi-weekly. She continues to work on healthy boundaries with her peers and family and finds that helpful as she is able to advocate for her needs. Overall she is reporting stable mental health with the exception of moments of grief and sadness. She did endorse some stress over the last week related to her son (who has a heart condition) recent COVID diagnoses and being concerned, this comes in the wake of her cousins death from COVID in the last month. She has been processing this with her therapist and group and has been checking in with her son regularly.     Dimension 4: Treatment Acceptance / Resistance -   Previous Dimension Ratin  Current Dimension Ratin  OCTAVIO Diagnosis:  304.30 (F12.20) Cannabis Use Disorder Severe     Opioid Use  Disorder, Specify if:  On a maintenance therapy with a severity of:  304.00 (F11.20) Severe  Stage: 1  Commitment to tx process/Stage of change: Action  OCTAVIO assignments:  Initial relapse prevention plan and goodbye letter.   Narrative: Patrick was previously in phase 2 however due to frequent relapses has now moved back to phase 1 (3x weekly groups) with an additional 1x1 scheduled on Thursday's after group until 2022. She was agreeable to this schedule change. Patrick reports motivation for outpatient programing and continues to work on advocating for her needs. She is on a treatment contract and has been following the expectations of that.     Dimension 5: Relapse / Continued Problem Potential -   Previous Dimension Rating:  3  Current Dimension Rating:  3  Relapses this week: None over the last week   Urges to use:  None  UA results: No results found for this or any previous visit (from the past 168 hour(s)).  Narrative: Patrick is working on identifying triggers, she initially identified pain as a primary trigger however over the last month it has been identified that she has other triggers as well and few coping skills to manage. She has some ideas of what skills she can use however struggles to implement her skills. She is working on her relapse prevention plan with a target date of completion to be 22. Patrick reported she has been using some deep breathing skills and working on crafting projects as coping skills and ways to keep herself busy. She is working on a healthy balanced routine as well, she is finding when she does not have a lot of down time she is more successful in her recovery activities. No urges, relapses or triggers endorsed over the last week.     Dimension 6: Recovery Environment -   Previous Dimension Ratin  Current Dimension Ratin  Family Involvement : None    Summarize attendance at family groups and family sessions: NA  Family supportive of treatment?  Yes  Community support  group attendance: [facility]   Recreational activities: spending time with her grandchildren   Narrative: Patient has support from her family and that her son calls her daily. Patrick began attending sober support meetings over the last week and has found benefit in them, she did report she is looking for meetings that have less of a spirituality component and will search for that on [facility]. 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  Patient reports no current legal concerns.     TOPIC  DATES   8 Dimensions of Wellness 9/6/21-9/10/21 & 11/8/21-11/12/21 & 1/10/22-1/14/22 & 1/17/22-1/21/22   Medical Aspects  9/13-9/16/21 & 11/15/21-11/19/21   Emotional Wellbeing/Mental Health  9/20-9/24/21, 11/22/21,   Acceptance  9/20/21-9/30/21 & 11/29   Relationships  10/4/21-10/8/21 & 12/5/21-12/10/21   Relapse Prevention  10/18/21-10/22/21, 10,25, 10/27, 10/28 & 12/20/21-12/24/21, 12/27/21-12/31/21   Sober Structure  10/11/21-10/15/21 & 12/13/21-12/17/21   Grief and Loss  8/30/21-9/2/21 &11/1/21-11/5/21 & 1/3/22-1/7/22     Justification for Continued Treatment at this Level of Care: Patient is at high risk for continued use and relapse. Patient has pain and that leads to relapse historically. She is currently in individual therapy. In phase 1 with additional 1x1 each week. She is gaining insight to relapse process.     Discharge Planning:  Target Discharge Date/Timeframe:  3/1/21   Med Mgmt Provider/Appt:  Daily due to MAT    Ind therapy Provider/Appt:  NA    Family therapy Provider/Appt:  NA   Other referrals:  Patient may need referral for pain clinic, PCP     Has vulnerable adult status change? N/A    Service Coordination:  Patient is scheduled for regular 1x1 sessions.     Supervision:  Patient is staffed with treatment providers, this includes: STEPHANY York and STEPHANY Pierre.       Are Treatment Plan goals/objectives effective? Yes  *If no, list changes to treatment  plan:    Are the current goals meeting client's needs? Yes  *If no, list the changes to treatment plan.    Client Input / Response: Agree      *Client agrees with any changes to the treatment plan: Yes  *Client received copy of changes: Yes  *Client is aware of right to access a treatment plan review: Yes

## 2022-01-24 ENCOUNTER — VIRTUAL VISIT (OUTPATIENT)
Dept: ADDICTION MEDICINE | Facility: CLINIC | Age: 57
End: 2022-01-24
Payer: MEDICARE

## 2022-01-24 DIAGNOSIS — F11.20 OPIOID USE DISORDER, SEVERE, DEPENDENCE (H): Primary | ICD-10-CM

## 2022-01-24 PROCEDURE — 90853 GROUP PSYCHOTHERAPY: CPT | Mod: GT

## 2022-01-24 NOTE — GROUP NOTE
Group Therapy Documentation    PATIENT'S NAME: Roger Fallon  MRN:   [id number]  :   1965  ACCT. NUMBER: [id number]  DATE OF SERVICE: 22  START TIME:  8:30 AM  END TIME: 11:30 AM  FACILITATOR(S): Trista Best LADC  TOPIC: BEH Group Therapy  Number of patients attending the group:  5  Group Length:  3 Hours    Group Therapy Type: Addiction and Psychoeducation    Summary of Group / Topics Discussed:    Cognitive Therapy Techniques, Emotional Regulation, Meditation/Breathing Exercises, Mindfulness, and Coping With Stress During Covid, Healthy vs. Unhealthy Emotions, What Depression and Anxiety Look Like on the Brain video and discussion.     Supervising MD: Dr. Garber     Video Visit:      Provider verified identity through the following two step process.  Patient provided:  Patient is known previously to provider and Patient was verified at admission/transfer    Telemedicine Visit: The patient's condition can be safely assessed and treated via synchronous audio and visual telemedicine encounter.      Reason for Telemedicine Visit: Services only offered telehealth    Originating Site (Patient Location): Patient's home    Distant Site (Provider Location): Provider Remote Setting- Home Office    Consent:  The patient/guardian has verbally consented to: the potential risks and benefits of telemedicine (video visit) versus in person care; bill my insurance or make self-payment for services provided; and responsibility for payment of non-covered services.     Patient would like the video invitation sent by:  Send to e-mail at: [email or web address]    Mode of Communication:  Video Conference via Medical Zoom    As the provider I attest to compliance with applicable laws and regulations related to telemedicine.  Group Attendance:  Attended group session    Patient's response to the group topic/interactions:  cooperative with task, discussed personal experience with topic, expressed understanding of  topic, gave appropriate feedback to peers and listened actively    Patient appeared to be Actively participating, Attentive and Engaged.        Client specific details:  During check-in, Patrick states having appointment for neurology, dentistry and therapist this week. She discussed intentions to keep trying to access entrance to [facility] meetings. Patrick also described concerns regarding her 1 yo grandson having Covid, though is not allowing it to be a major stress factor for her.

## 2022-01-26 ENCOUNTER — DOCUMENTATION ONLY (OUTPATIENT)
Dept: ADDICTION MEDICINE | Facility: CLINIC | Age: 57
End: 2022-01-26
Payer: MEDICARE

## 2022-01-26 NOTE — PROGRESS NOTES
ABSENT NOTE:    Roger Fallon was absent from group 1/26/2022 . This absence was excused. Patient contacted this writer via email to report she would only be able to attend a half hour of group due to medical appointments this morning.  Patient is expected to be in group on 01/27/22.     STEPHANY Ocampo  1/26/2022 , 8:19 AM

## 2022-01-27 ENCOUNTER — VIRTUAL VISIT (OUTPATIENT)
Dept: ADDICTION MEDICINE | Facility: CLINIC | Age: 57
End: 2022-01-27
Payer: MEDICARE

## 2022-01-27 DIAGNOSIS — F11.20 OPIOID USE DISORDER, SEVERE, DEPENDENCE (H): Primary | ICD-10-CM

## 2022-01-27 PROCEDURE — 90853 GROUP PSYCHOTHERAPY: CPT | Mod: GT

## 2022-01-27 NOTE — GROUP NOTE
Group Therapy Documentation    PATIENT'S NAME: Roger Fallon  MRN:   [id number]  :   1965  ACCT. NUMBER: [id number]  DATE OF SERVICE: 22  START TIME:  8:30 AM  END TIME: 11:30 AM  FACILITATOR(S): Trista Best LADC  TOPIC: BEH Group Therapy  Number of patients attending the group:  5  Group Length:  3 Hours    Group Therapy Type: Addiction and Psychoeducation    Summary of Group / Topics Discussed:    Emotional Regulation, Interpersonal Effectiveness, Meditation/Breathing Exercises, Mindfulness, Self-Care Activities, Self-Esteem/Self [place], Thinking Errors/Negative Self-Talk, and Types of Anxiety / Depression and Self-Care.    Supervising MD: Dr. Garber   Video Visit:      Provider verified identity through the following two step process.  Patient provided:  Patient is known previously to provider and Patient was verified at admission/transfer    Telemedicine Visit: The patient's condition can be safely assessed and treated via synchronous audio and visual telemedicine encounter.      Reason for Telemedicine Visit: Services only offered telehealth    Originating Site (Patient Location): Patient's home    Distant Site (Provider Location): Provider Remote Setting- Home Office    Consent:  The patient/guardian has verbally consented to: the potential risks and benefits of telemedicine (video visit) versus in person care; bill my insurance or make self-payment for services provided; and responsibility for payment of non-covered services.     Patient would like the video invitation sent by:  Send to e-mail at: [email or web address]    Mode of Communication:  Video Conference via Medical Zoom    As the provider I attest to compliance with applicable laws and regulations related to telemedicine.    Group Attendance:  Attended group session    Patient's response to the group topic/interactions:  cooperative with task, discussed personal experience with topic, expressed understanding of topic, gave  appropriate feedback to peers and listened actively    Patient appeared to be Actively participating, Attentive and Engaged.        Client specific details:  Patrick expressed having anxiety regarding her appointment to have some teeth extracted today. She states she will take OTC for pain afterward. Patrick identifies two coping skills to help her with urges to use has been calling her sister and face-timing with her 1 yo grandson when experiences knee pain helps her to refocus.

## 2022-01-29 ENCOUNTER — DOCUMENTATION ONLY (OUTPATIENT)
Dept: ADDICTION MEDICINE | Facility: CLINIC | Age: 57
End: 2022-01-29
Payer: MEDICARE

## 2022-01-30 NOTE — PROGRESS NOTES
[facility] Weekly Treatment Plan Review      ATTENDANCE for the following date span:  22-22    Date     Group Therapy No group hours No group  hours 3 hours 3 hours No group    Individual Therapy        Family Therapy        Other (Specify) Phase 1 &2  Phase 3  Phase 1  Phase 1 & 2    Patrick also has a weekly 1x1 on this day with OP staff.         Patient had 1 absence during this time period. She was excused to attend a medical appointment on 22.    Total hours: 134/108. See dimension 4 for extended programing description     Weekly Treatment Plan Review     Treatment Plan initiated on: 21.    Dimension1: Acute Intoxication/Withdrawal Potential -   Previous Dimension Ratin  Current Dimension Ratin  Date of Last Use: 21  Any reports of withdrawal symptoms: Yes, Patient has withdrawal symptoms if she does not take her Methadone at scheduled time  Narrative: Patient reports date of last use of marijuana to be 21. Reported use on 21 & 21 as well as 21 &21, she reported use from 21-21. She is on Methadone and finding benefit in that. Patrick reports sustained abstinence over the last week. No changes.      Dimension 2: Biomedical Conditions & Complications -   Previous Dimension Ratin  Current Dimension Ratin  Medical Concerns:  Patient has bad knees, she also is being monitored by PCP for biomedical concerns.   Current Medications & Medication Changes:  Current Outpatient Medications   Medication     escitalopram (LEXAPRO) 20 MG tablet     METFORMIN HCL PO     METHADONE HCL PO     OXcarbazepine (TRILEPTAL) 300 MG tablet     No current facility-administered medications for this visit.     Medication Prescriber:  PCP and MAT   Taking meds as prescribed? Yes  Medication side effects or concerns:  Withdrawal from Methadone if not dosed at appropriate time.   Outside medical appointments this week  (list provider and reason for visit):  None    Narrative: Patient reports both knees are in poor condition. Patient identified a need for knee replacements. She is working with providers to address her concerns and has met with a surgeon, tentative surgery date is in 2022. In the internum she is working on strengthening her knees by doing physical therapy exercises so she is ready for surgery when it available. Patrick's CPAP is still on recall with no replacement so she is not currently using that but not endorsing major sleep problems. During check-in, Patrick states having appointments for neurology and dentistry this week. Overall Patrick is reporting stable physical health with no concerns.     Dimension 3: Emotional/Behavioral Conditions & Complications -   Previous Dimension Ratin  Current Dimension Ratin  PHQ9   No flowsheet data found.  GAD7   No flowsheet data found.  Mental health diagnosis None   Date of last SIB:  NA  Date of  last SI:  NA  Date of last HI: NA  Behavioral Targets:  Develop coping skills to manage grief and loss.   Current MH Assignments:  Engage in mindfulness   Narrative:  Patrick has experienced significant grief and loss over the last year with her nephew, father and most recently her cousin. Patrick is seeing an individual therapist and finding benefit in that. She reports her sessions are bi-weekly. She continues to work on healthy boundaries with her peers and family and finds that helpful as she is able to advocate for her needs. Overall she is reporting stable mental health with the exception of moments of grief and sadness. She did endorse concerns about her 3 yo grandson who lives in [place] who has Covid. She states that she has been face timing with her grandson and he appears to be energetic, so is not allowing it to be a major stress factor for her. She has been processing this with her therapist and group and has been checking in with her son regularly. She reports she  has a scheduled therapist appointment this week.    Dimension 4: Treatment Acceptance / Resistance -   Previous Dimension Ratin  Current Dimension Ratin  OCTAVIO Diagnosis:  304.30 (F12.20) Cannabis Use Disorder Severe     Opioid Use Disorder, Specify if:  On a maintenance therapy with a severity of:  304.00 (F11.20) Severe  Stage: 1  Commitment to tx process/Stage of change: Action  OCTAVIO assignments:  Initial relapse prevention plan and goodbye letter.   Narrative: Patrick was previously in phase 2 however due to frequent relapses has now moved back to phase 1 (3x weekly groups) with an additional 1x1 scheduled on Thursday's after group until 2022. She was agreeable to this schedule change. Patrick reports motivation for outpatient programing and continues to work on advocating for her needs. She is on a treatment contract and has been following the expectations of that. During check-in this week, she identifies her motivation for recovery at this time is an 8 on a scale from 1-10 with 10 being the highest. She explained that she is committed to staying away from substances, though is being realistic that pain factors into triggers that she experiences.     Dimension 5: Relapse / Continued Problem Potential -   Previous Dimension Rating:  3  Current Dimension Rating:  3  Relapses this week: None over the last week   Urges to use:  None  UA results: No results found for this or any previous visit (from the past 168 hour(s)).  Narrative: Patrick is working on identifying triggers, she initially identified pain as a primary trigger however over the last month it has been identified that she has other triggers as well and few coping skills to manage. She has some ideas of what skills she can use however struggles to implement her skills. She is working on her relapse prevention plan with a target date of completion to be 22. Patrick reported she has been using some deep breathing skills and working on crafting  projects as coping skills and ways to keep herself busy. She is working on a healthy balanced routine as well, she is finding when she does not have a lot of down time she is more successful in her recovery activities. This week she reports the coping skills she has been implementing to avoid relapsing is calling her sister and children, also intentional fun time with her granddaughter. No urges, relapses or triggers endorsed over the last week.     Dimension 6: Recovery Environment -   Previous Dimension Ratin  Current Dimension Ratin  Family Involvement : None    Summarize attendance at family groups and family sessions: NA  Family supportive of treatment?  Yes  Community support group attendance: [facility]   Recreational activities: spending time with her grandchildren   Narrative: Patient has support from her family and that her son calls her daily. Patrick recently began attending sober support meetings and reports benefit in them, she did report she is looking for meetings that have less of a spirituality component and will search for that on [facility]. 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  Patient reports no current legal concerns. Patrick reports she is continuing toward her goal of purchasing rope and beads to make [employer] projects this weekend.      TOPIC  DATES   8 Dimensions of Wellness 21-9/10/21 & 21-21 & 1/10/22-22 & 22-22   Medical Aspects  -21 & 11/15/21-21   Emotional Wellbeing/Mental Health  -21, 21, -22   Acceptance  21-21 &    Relationships  10/4/21-10/8/21 & 21-12/10/21   Relapse Prevention  10/18/21-10/22/21, 10,, 10/27, 10/28 & 21-21, 21-21   Sober Structure  10/11/21-10/15/21 & 21-21   Grief and Loss  21-21 &21-21 & 1/3/22-22     Justification for Continued Treatment at this Level of Care: Patient  is at high risk for continued use and relapse. Patient has pain and that leads to relapse historically. She is currently in individual therapy. In phase 1 with additional 1x1 each week. She is gaining insight to relapse process.     Discharge Planning:  Target Discharge Date/Timeframe:  3/1/21   Med Mgmt Provider/Appt:  Daily due to MAT    Ind therapy Provider/Appt:  NA    Family therapy Provider/Appt:  NA   Other referrals:  Patient may need referral for pain clinic, PCP     Has vulnerable adult status change? N/A    Service Coordination:  Patient is scheduled for regular 1x1 sessions.     Supervision:  Patient is staffed with treatment providers, this includes: STEPHANY York and STEPHANY Pierre.       Are Treatment Plan goals/objectives effective? Yes  *If no, list changes to treatment plan:    Are the current goals meeting client's needs? Yes  *If no, list the changes to treatment plan.    Client Input / Response: Agree      *Client agrees with any changes to the treatment plan: Yes  *Client received copy of changes: Yes  *Client is aware of right to access a treatment plan review: Yes

## 2022-01-31 ENCOUNTER — OFFICE VISIT (OUTPATIENT)
Dept: ADDICTION MEDICINE | Facility: CLINIC | Age: 57
End: 2022-01-31
Payer: MEDICARE

## 2022-01-31 ENCOUNTER — DOCUMENTATION ONLY (OUTPATIENT)
Dept: ADDICTION MEDICINE | Facility: CLINIC | Age: 57
End: 2022-01-31
Payer: MEDICARE

## 2022-01-31 ENCOUNTER — VIRTUAL VISIT (OUTPATIENT)
Dept: ADDICTION MEDICINE | Facility: CLINIC | Age: 57
End: 2022-01-31
Payer: MEDICARE

## 2022-01-31 DIAGNOSIS — F11.20 OPIOID USE DISORDER, SEVERE, DEPENDENCE (H): Primary | ICD-10-CM

## 2022-01-31 PROCEDURE — 90832 PSYTX W PT 30 MINUTES: CPT

## 2022-01-31 PROCEDURE — 90853 GROUP PSYCHOTHERAPY: CPT | Mod: PO

## 2022-01-31 NOTE — PROGRESS NOTES
Individual Session Summary   START TIME: 11:30AM   END TIME: 12:02PM   Duration: 31 Minutes    Roger Fallon is a 56 year old female who is being evaluated via a billable video visit.      Telemedicine Visit: The patient's condition can be safely assessed and treated via synchronous audio and visual telemedicine encounter.      Reason for Telemedicine Visit: Service only offered via tele-medicine.    Originating Site (Patient Location): Patient's home    Distant Site (Provider Location): Provider Remote Setting - Home Office    Consent:  The patient/guardian has verbally consented to: the potential risks and benefits of telemedicine (video visit) versus in person care; billing of their insurance or make self-payment for services provided; and responsibility for payment of non-covered services.     Mode of Communication:  Video Conference via [employer].  Session Successfully completed: Yes  If session was not successful what alternative options were discussed: NA - Successful    As the provider I attest to compliance with applicable laws and regulations related to telemedicine.     This appointment was rescheduled from last week due patient having a prescheduled dentist appointment. On this day,  and Patrick met to discuss her readiness to transition to two days a week in phase 2 SRP. Patrick has been previously reminded more than twice that the conditions of her transferring to phase 2 would be that she completed her Initial Relapse Prevention homework assignment that was sent to her at the start of treatment group sessions. This condition was established due to her relapsing 3x while in SRP. She had stated that it would be completed by today's date, however, she states she has not seriously worked on it. We spent time going over the descriptions of the 3 stages of relapse and suggested coping skills to avoid relapse. She states she has a better understanding of the homework assignment and will meet  with writer again this week on 2/3/22 following group session to discuss progress. Patrick did discuss that she continues to have triggers and urges from knee and leg pain. She states that she recently requested an increase in methadone dose for pain from her counselor Cee at [place] and was denied due to history of relapsing. She describes that she is determined to refrain from using her DOC and is looking forward to her first knee replacement surgery scheduled for 7/2022. Writer reiterated to Patrick the importance of her reaching out to [facility] counselors as needed. Patrick request that writer email her another IRPP work sheets assignment and that was sent following this session.
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
## 2022-02-02 ENCOUNTER — VIRTUAL VISIT (OUTPATIENT)
Dept: ADDICTION MEDICINE | Facility: CLINIC | Age: 57
End: 2022-02-02
Attending: FAMILY MEDICINE
Payer: MEDICARE

## 2022-02-02 DIAGNOSIS — F11.20 OPIOID USE DISORDER, SEVERE, DEPENDENCE (H): Primary | ICD-10-CM

## 2022-02-02 PROCEDURE — 90853 GROUP PSYCHOTHERAPY: CPT | Mod: GT

## 2022-02-03 ENCOUNTER — VIRTUAL VISIT (OUTPATIENT)
Dept: ADDICTION MEDICINE | Facility: CLINIC | Age: 57
End: 2022-02-03
Attending: FAMILY MEDICINE
Payer: MEDICARE

## 2022-02-03 DIAGNOSIS — F11.20 OPIOID USE DISORDER, SEVERE, DEPENDENCE (H): Primary | ICD-10-CM

## 2022-02-03 PROCEDURE — 90853 GROUP PSYCHOTHERAPY: CPT | Mod: PO

## 2022-02-03 NOTE — GROUP NOTE
Group Therapy Documentation    PATIENT'S NAME: Roger Fallon  MRN:   [id number]  :   1965  ACCT. NUMBER: [id number]  DATE OF SERVICE: 22  START TIME:  8:30 AM  END TIME: 11:30 AM  FACILITATOR(S): Trista Best LADC  TOPIC: BEH Group Therapy  Number of patients attending the group:  5  Group Length:  3 Hours    Group Therapy Type: Addiction and Psychoeducation    Summary of Group / Topics Discussed:    Cognitive Therapy Techniques, Coping Skills/Lifestyle Managemet, Meditation/Breathing Exercises, Mindfulness, Proper Nutrition & Exercise, Self-Care Activities, Sleep Hygiene, Stress Management, Symptom Management, and Medical Aspects, The Chemistry of Addiction.    Supervising MD: Dr. Garber     Video Visit:      Provider verified identity through the following two step process.  Patient provided:  Patient is known previously to provider and Patient was verified at admission/transfer    Telemedicine Visit: The patient's condition can be safely assessed and treated via synchronous audio and visual telemedicine encounter.      Reason for Telemedicine Visit: Services only offered telehealth    Originating Site (Patient Location): Patient's home    Distant Site (Provider Location): Provider Remote Setting- Home Office    Consent:  The patient/guardian has verbally consented to: the potential risks and benefits of telemedicine (video visit) versus in person care; bill my insurance or make self-payment for services provided; and responsibility for payment of non-covered services.     Patient would like the video invitation sent by:  Send to e-mail at: [email or web address]    Mode of Communication:  Video Conference via Medical Zoom    As the provider I attest to compliance with applicable laws and regulations related to telemedicine.  Group Attendance:  Attended group session    Patient's response to the group topic/interactions:  cooperative with task, discussed personal experience with topic,  "expressed understanding of topic, gave appropriate feedback to peers and listened actively    Patient appeared to be Actively participating, Attentive and Engaged.        Client specific details:  Patrick expressed the sunshine today is good for her mental health. She described the recent specific exercises she is doing for 20 minutes 3x a day has been helpful in lessening pain in her knees and foot. She rates her current motivation for recovery a 9 and \"going up.\" Giancarlo states that she is determined to keep going with sobriety maintenance.    Attestation:   Moo Garber MD - Medical Director - Provides oversight and supervision of care.      "
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
## 2022-02-04 ENCOUNTER — DOCUMENTATION ONLY (OUTPATIENT)
Dept: ADDICTION MEDICINE | Facility: CLINIC | Age: 57
End: 2022-02-04
Payer: MEDICARE
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
## 2022-02-07 ENCOUNTER — VIRTUAL VISIT (OUTPATIENT)
Dept: ADDICTION MEDICINE | Facility: CLINIC | Age: 57
End: 2022-02-07
Attending: FAMILY MEDICINE
Payer: MEDICARE

## 2022-02-07 DIAGNOSIS — F11.20 OPIOID USE DISORDER, SEVERE, DEPENDENCE (H): Primary | ICD-10-CM

## 2022-02-07 PROCEDURE — 90853 GROUP PSYCHOTHERAPY: CPT | Mod: GT
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
## 2022-02-10 ENCOUNTER — VIRTUAL VISIT (OUTPATIENT)
Dept: ADDICTION MEDICINE | Facility: CLINIC | Age: 57
End: 2022-02-10
Attending: FAMILY MEDICINE
Payer: MEDICARE

## 2022-02-10 DIAGNOSIS — F11.20 OPIOID USE DISORDER, SEVERE, DEPENDENCE (H): Primary | ICD-10-CM

## 2022-02-10 PROCEDURE — 90853 GROUP PSYCHOTHERAPY: CPT | Mod: GT

## 2022-02-10 NOTE — GROUP NOTE
Group Therapy Documentation    PATIENT'S NAME: Roger Fallon  MRN:   [id number]  :   1965  ACCT. NUMBER: [id number]  DATE OF SERVICE: 2/10/22  START TIME:  8:30 AM  END TIME: 11:30 AM  FACILITATOR(S): Trista Best LADC; Nevin Monet LADC  TOPIC: BEH Group Therapy  Number of patients attending the group:  5  Group Length:  3 Hours    Group Therapy Type: Addiction and Psychoeducation    Summary of Group / Topics Discussed:    Boundaries, Cognitive Therapy Techniques, Coping Skills/Lifestyle Managemet, Choices in Recovery, Meditation/Breathing Exercises, Mindfulness, Self-Care Activities, Thinking Errors/Negative Self-Talk, and Acceptance in Addiction Recovery    Supervising MD: Dr. Garber     Video Visit:      Provider verified identity through the following two step process.  Patient provided:  Patient is known previously to provider and Patient was verified at admission/transfer    Telemedicine Visit: The patient's condition can be safely assessed and treated via synchronous audio and visual telemedicine encounter.      Reason for Telemedicine Visit: Services only offered telehealth    Originating Site (Patient Location): Patient's home    Distant Site (Provider Location): Provider Remote Setting- Home Office    Consent:  The patient/guardian has verbally consented to: the potential risks and benefits of telemedicine (video visit) versus in person care; bill my insurance or make self-payment for services provided; and responsibility for payment of non-covered services.     Patient would like the video invitation sent by:  Send to e-mail at: [email or web address]    Mode of Communication:  Video Conference via Medical Zoom    As the provider I attest to compliance with applicable laws and regulations related to telemedicine.    Group Attendance:  Attended group session    Patient's response to the group topic/interactions:  cooperative with task, expressed understanding of topic, gave  appropriate feedback to peers and listened actively    Patient appeared to be Actively participating, Attentive and Engaged.        Client specific details:  Patrick discussed being happy that her 6 yo granddaugher has an acting audition this coming weekend. She describes that she has been doing self-care and has plans to attend an [facility] sober meeting. She states she is working on stopping procrastinating on starting project goals.    Attestation:   Moo Garber MD - Medical Director - Provides oversight and supervision of care.

## 2022-02-11 ENCOUNTER — DOCUMENTATION ONLY (OUTPATIENT)
Dept: ADDICTION MEDICINE | Facility: CLINIC | Age: 57
End: 2022-02-11
Payer: MEDICARE

## 2022-02-11 NOTE — PROGRESS NOTES
[facility] Weekly Treatment Plan Review      ATTENDANCE for the following date span:  22-22    Date     Group Therapy 3.0 No group  hours No group hours 3 hours No group    Individual Therapy 1.0       Family Therapy        Other (Specify) Phase 1 &2  Phase 3  Phase 1  Phase 1 & 2             Patient had 0 absence during this time period.     Total hours: 150/108. See dimension 4 for extended programing description     Weekly Treatment Plan Review     Treatment Plan initiated on: 21.    Dimension1: Acute Intoxication/Withdrawal Potential -   Previous Dimension Ratin  Current Dimension Ratin  Date of Last Use: 21  Any reports of withdrawal symptoms: Yes, Patient has withdrawal symptoms if she does not take her Methadone at scheduled time  Narrative: Patient reports date of last use of marijuana to be 21. Reported use on 21 & 21 as well as 21 &21, she reported use from 21-21. She is on Methadone and finding benefit in that. Patrick reports sustained abstinence over the last week.     Dimension 2: Biomedical Conditions & Complications -   Previous Dimension Ratin  Current Dimension Ratin  Medical Concerns:  Patient has bad knees, she also is being monitored by PCP for biomedical concerns.   Current Medications & Medication Changes:  Current Outpatient Medications   Medication     escitalopram (LEXAPRO) 20 MG tablet     METFORMIN HCL PO     METHADONE HCL PO     OXcarbazepine (TRILEPTAL) 300 MG tablet     No current facility-administered medications for this visit.     Medication Prescriber:  PCP and MAT   Taking meds as prescribed? Yes  Medication side effects or concerns:  Withdrawal from Methadone if not dosed at appropriate time.   Outside medical appointments this week (list provider and reason for visit):  None    Narrative: Patient reports both knees are in poor condition. Patient identified a  need for knee replacements. She is working with providers to address her concerns and has met with a surgeon, tentative surgery date is in 2022. In the internum she is working on strengthening her knees by doing physical therapy exercises so she is ready for surgery when it available. Patrick's CPAP is still on recall with no replacement so she is not currently using that but not endorsing major sleep problems.She did report she planned to call the medical device company to see where her CPAP machine is. Her upcoming appointments are Dental for filling on 22 and Neurology on 22. No new or emergent concerns.     Dimension 3: Emotional/Behavioral Conditions & Complications -   Previous Dimension Ratin  Current Dimension Ratin  PHQ9   No flowsheet data found.  GAD7   No flowsheet data found.  Mental health diagnosis None   Date of last SIB:  NA  Date of  last SI:  NA  Date of last HI: NA  Behavioral Targets:  Develop coping skills to manage grief and loss.   Current MH Assignments:  Engage in mindfulness   Narrative:  Patrick has experienced significant grief and loss over the last year with her nephew, father and most recently her cousin. Patrick is seeing an individual therapist and finding benefit in that. She reports her sessions are bi-weekly. She continues to work on healthy boundaries with her peers and family and finds that helpful as she is able to advocate for her needs. She reports her mental health is stable, she attributed this to meeting with her therapist regularly and having good coping skills. She reports her mental health is improving and attributes that to meeting with her therapist regularly.    Dimension 4: Treatment Acceptance / Resistance -   Previous Dimension Ratin  Current Dimension Ratin  OCTAVIO Diagnosis:  304.30 (F12.20) Cannabis Use Disorder Severe     Opioid Use Disorder, Specify if:  On a maintenance therapy with a severity of:  304.00 (F11.20) Severe  Stage:  2  Commitment to tx process/Stage of change: Action  OCTAVIO assignments:  Initial relapse prevention plan and goodbye letter.   Narrative: Patrick was on an extended phase 1 program due to frequent relapses. She is on a treatment contract at this time. She did complete her relapse prevention plan and we will continue to follow up with her on alterations. Patrick began phase 2 this week. She is an active and engaged group member.     Dimension 5: Relapse / Continued Problem Potential -   Previous Dimension Rating:  3  Current Dimension Rating:  3  Relapses this week: None over the last week   Urges to use:  None  UA results: No results found for this or any previous visit (from the past 168 hour(s)).  Narrative: Patrick is working on identifying triggers, she initially identified pain as a primary trigger however over the last month it has been identified that she has other triggers as well and few coping skills to manage. She has some ideas of what skills she can use however struggles to implement her skills. She has a relapse prevention plan and is beginning to implement the skills she has identified. Patrick reported she has been using some deep breathing skills and working on crafting projects as coping skills and ways to keep herself busy. Patrick reports no urges, relapses or triggers over the last week and attributes that to being busy and her positive mood. She reports using hobbies and calling family as coping skills this week.     Dimension 6: Recovery Environment -   Previous Dimension Ratin  Current Dimension Ratin  Family Involvement : None    Summarize attendance at family groups and family sessions: NA  Family supportive of treatment?  Yes  Community support group attendance: [facility]   Recreational activities: spending time with her grandchildren   Narrative: Patient has support from her family and that her son calls her daily. Patrick recently began attending sober support meetings and reports benefit in  them, she did report she is looking for meetings that have less of a spirituality component and will search for that on [facility]. She did report she will commit to 1 meeting over the weekend. 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  Patient reports no current legal concerns. Patrick reports she started her [employer] project and is finding enjoyment in that.     TOPIC  DATES   8 Dimensions of Wellness 9/6/21-9/10/21 & 11/8/21-11/12/21 & 1/10/22-1/14/22 & 1/17/22-1/21/22   Medical Aspects  9/13-9/16/21 & 11/15/21-11/19/21, 1/31-2/3,   Emotional Wellbeing/Mental Health  9/20-9/24/21, 11/22/21, 1/24-1/27/22   Acceptance  9/20/21-9/30/21 & 11/29 2/7/22-2/11/22   Relationships  10/4/21-10/8/21 & 12/5/21-12/10/21   Relapse Prevention  10/18/21-10/22/21, 10,25, 10/27, 10/28 & 12/20/21-12/24/21, 12/27/21-12/31/21   Sober Structure  10/11/21-10/15/21 & 12/13/21-12/17/21   Grief and Loss  8/30/21-9/2/21 &11/1/21-11/5/21 & 1/3/22-1/7/22     Justification for Continued Treatment at this Level of Care: Patient is at high risk for continued use and relapse. Patient has pain and that leads to relapse. She is gaining insight to relapse process.     Discharge Planning:  Target Discharge Date/Timeframe:  3/1/21   Med Mgmt Provider/Appt:  Daily due to MAT    Ind therapy Provider/Appt:  NA    Family therapy Provider/Appt:  NA   Other referrals:  Patient may need referral for pain clinic, PCP     Has vulnerable adult status change? N/A    Service Coordination:  Patient is scheduled for regular 1x1 sessions.     Supervision:  Patient is staffed with treatment providers, this includes: STEPHANY York and STEPHANY Pierre.     Attestation:   Moo Garber MD - Medical Director - Provides oversight and supervision of care.      Are Treatment Plan goals/objectives effective? Yes  *If no, list changes to treatment plan:    Are the current goals meeting client's needs? Yes  *If no, list the changes to  treatment plan.    Client Input / Response: Agree      *Client agrees with any changes to the treatment plan: Yes  *Client received copy of changes: Yes  *Client is aware of right to access a treatment plan review: Yes

## 2022-02-14 ENCOUNTER — VIRTUAL VISIT (OUTPATIENT)
Dept: ADDICTION MEDICINE | Facility: CLINIC | Age: 57
End: 2022-02-14
Attending: FAMILY MEDICINE
Payer: MEDICARE

## 2022-02-14 DIAGNOSIS — F11.20 OPIOID USE DISORDER, SEVERE, DEPENDENCE (H): Primary | ICD-10-CM

## 2022-02-14 PROCEDURE — 90853 GROUP PSYCHOTHERAPY: CPT | Mod: GT

## 2022-02-14 NOTE — GROUP NOTE
Group Therapy Documentation    PATIENT'S NAME: Roger Fallon  MRN:   [id number]  :   1965  ACCT. NUMBER: [id number]  DATE OF SERVICE: 22  START TIME:  8:30 AM  END TIME: 11:30 AM  FACILITATOR(S): Nevin Monet LADC; Trista Best LADC  TOPIC: BEH Group Therapy  Number of patients attending the group:  5  Group Length:  3 Hours    Group Therapy Type: Addiction and Psychoeducation    Summary of Group / Topics Discussed:    Cognitive Therapy Techniques, Choices in Recovery, and Meditation/Breathing Exercises    Attestation:   Moo Garber MD - Medical Director - Provides oversight and supervision of care.    Video Visit:      Provider verified identity through the following two step process.  Patient provided:  Patient is known previously to provider and Patient was verified at admission/transfer    Telemedicine Visit: The patient's condition can be safely assessed and treated via synchronous audio and visual telemedicine encounter.      Reason for Telemedicine Visit: Services only offered telehealth    Originating Site (Patient Location): Patient's home    Distant Site (Provider Location): Provider Remote Setting- Home Office    Consent:  The patient/guardian has verbally consented to: the potential risks and benefits of telemedicine (video visit) versus in person care; bill my insurance or make self-payment for services provided; and responsibility for payment of non-covered services.     Patient would like the video invitation sent by:  Send to e-mail at: [email or web address]    Mode of Communication:  Video Conference via Medical Zoom    As the provider I attest to compliance with applicable laws and regulations related to telemedicine.    Group Attendance:  Attended group session    Patient's response to the group topic/interactions:  cooperative with task and discussed personal experience with topic    Patient appeared to be Actively participating, Attentive and Engaged.         Client specific details:  Patrick shared she has been feeling really good and proud of herself and family. She identified her personal strengths today as being caring and a good listener stating she has empathy for those around her.

## 2022-02-17 ENCOUNTER — DOCUMENTATION ONLY (OUTPATIENT)
Dept: ADDICTION MEDICINE | Facility: CLINIC | Age: 57
End: 2022-02-17
Payer: MEDICARE

## 2022-02-18 ENCOUNTER — DOCUMENTATION ONLY (OUTPATIENT)
Dept: ADDICTION MEDICINE | Facility: CLINIC | Age: 57
End: 2022-02-18
Payer: MEDICARE

## 2022-02-18 NOTE — PROGRESS NOTES
[facility] Weekly Treatment Plan Review      ATTENDANCE for the following date span:  22-22    Date     Group Therapy 3.0 No group  hours No group hours No group hours  No group    Individual Therapy        Family Therapy        Other (Specify) Phase 1 &2  Phase 3  Phase 1  Phase 1 & 2             Patient had 1 absence during this time period due to her son being in town. She did report prior to group that she would not be present on 22.     Total hours: 153/108 phase 1 & 2 hours. See dimension 4 for extended programing description     Weekly Treatment Plan Review     Treatment Plan initiated on: 21.    Dimension1: Acute Intoxication/Withdrawal Potential -   Previous Dimension Ratin  Current Dimension Ratin  Date of Last Use: 21  Any reports of withdrawal symptoms: Yes, Patient has withdrawal symptoms if she does not take her Methadone at scheduled time  Narrative: Patient reports date of last use of marijuana to be 21. Reported use on 21 & 21 as well as 21 &21, she reported use from 21-21. She is on Methadone and finding benefit in that. Patrick reports sustained abstinence over the last week.  No change.     Dimension 2: Biomedical Conditions & Complications -   Previous Dimension Ratin  Current Dimension Ratin  Medical Concerns:  Patient has bad knees, she also is being monitored by PCP for biomedical concerns.   Current Medications & Medication Changes:  Current Outpatient Medications   Medication     escitalopram (LEXAPRO) 20 MG tablet     METFORMIN HCL PO     METHADONE HCL PO     OXcarbazepine (TRILEPTAL) 300 MG tablet     No current facility-administered medications for this visit.     Medication Prescriber:  PCP and MAT   Taking meds as prescribed? Yes  Medication side effects or concerns:  Withdrawal from Methadone if not dosed at appropriate time.   Outside medical appointments  this week (list provider and reason for visit):  None    Narrative: Patient reports both knees are in poor condition. Patient identified a need for knee replacements. She is working with providers to address her concerns and has met with a surgeon, tentative surgery date is in 2022. In the internum she is working on strengthening her knees by doing physical therapy exercises so she is ready for surgery when it available. Patrick's CPAP is still on recall with no replacement so she is not currently using that but not endorsing major sleep problems.She did report she planned to call the medical device company to see where her CPAP machine is. Her upcoming appointments are Dental for filling on 22 and Neurology on 22. Patrick did not endorse any new or emergent biomedical concerns.    Dimension 3: Emotional/Behavioral Conditions & Complications -   Previous Dimension Ratin  Current Dimension Ratin  PHQ9   No flowsheet data found.  GAD7   No flowsheet data found.  Mental health diagnosis None   Date of last SIB:  NA  Date of  last SI:  NA  Date of last HI: NA  Behavioral Targets:  Develop coping skills to manage grief and loss.   Current MH Assignments:  Engage in mindfulness   Narrative:  Patrick has experienced significant grief and loss over the last year with her nephew, father and most recently her cousin. Patrick is seeing an individual therapist and finding benefit in that. She reports her sessions are bi-weekly. She continues to work on healthy boundaries with her peers and family and finds that helpful as she is able to advocate for her needs. She reports her mental health is stable, she attributed this to meeting with her therapist regularly and having good coping skills. She reports her mental health is improving and over the last week reported she was feeling really good and felt proud. Patrick continues to work on her impulsive behaviors as evidence by her maintained sobriety and behavioral changes.      Dimension 4: Treatment Acceptance / Resistance -   Previous Dimension Ratin  Current Dimension Ratin  OCTAVIO Diagnosis:  304.30 (F12.20) Cannabis Use Disorder Severe     Opioid Use Disorder, Specify if:  On a maintenance therapy with a severity of:  304.00 (F11.20) Severe  Stage: 2  Commitment to tx process/Stage of change: Action  OCTAVIO assignments:  Initial relapse prevention plan and goodbye letter.   Narrative: Patrick was on an extended phase 1 program due to frequent relapses. She is on a treatment contract at this time. She did complete her relapse prevention plan and we will continue to follow up with her on alterations. Patrick is currently in phase 2, she is an active and engaged group member. Patrick was absent on 22 due to her son being in town from [place].     Dimension 5: Relapse / Continued Problem Potential -   Previous Dimension Rating:  3  Current Dimension Rating:  3  Relapses this week: None over the last week   Urges to use:  None  UA results: No results found for this or any previous visit (from the past 168 hour(s)).  Narrative: Patrick is working on identifying triggers, she initially identified pain as a primary trigger however over the last month it has been identified that she has other triggers as well and few coping skills to manage. She has began to gain insight as to what coping skills are working for her and what ones are not. This has lead her to implement them more regularly and find benefit from that. Patrick reports no urges, relapses or triggers over the last week. No emergent concerns.     Dimension 6: Recovery Environment -   Previous Dimension Ratin  Current Dimension Ratin  Family Involvement : None    Summarize attendance at family groups and family sessions: NA  Family supportive of treatment?  Yes  Community support group attendance: [facility]   Recreational activities: spending time with her grandchildren   Narrative: Patient has support from her family  and that her son calls her daily. Patrick recently began attending sober support meetings and reports benefit in them, she did report she is looking for meetings that have less of a spirituality component and has found that in [employer]. This writer will check in on goal to attend 1 sober support meeting over the last week on Monday when she presents for her next group. 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  Patient reports no current legal concerns. Patrick reports she started her [employer] project and is finding enjoyment in that along with other hobbies.     TOPIC  DATES   8 Dimensions of Wellness 9/6/21-9/10/21 & 11/8/21-11/12/21 & 1/10/22-1/14/22 & 1/17/22-1/21/22   Medical Aspects  9/13-9/16/21 & 11/15/21-11/19/21, 1/31-2/3,   Emotional Wellbeing/Mental Health  9/20-9/24/21, 11/22/21, 1/24-1/27/22   Acceptance  9/20/21-9/30/21 & 11/29 2/7/22-2/11/22, 2/14/22-2/18/22   Relationships  10/4/21-10/8/21 & 12/5/21-12/10/21   Relapse Prevention  10/18/21-10/22/21, 10,25, 10/27, 10/28 & 12/20/21-12/24/21, 12/27/21-12/31/21   Sober Structure  10/11/21-10/15/21 & 12/13/21-12/17/21   Grief and Loss  8/30/21-9/2/21 &11/1/21-11/5/21 & 1/3/22-1/7/22     Justification for Continued Treatment at this Level of Care: Patient is at high risk for continued use and relapse. Patient has pain and that leads to relapse. She is gaining insight to relapse process.     Discharge Planning:  Target Discharge Date/Timeframe:  3/1/21   Med Mgmt Provider/Appt:  Daily due to MAT    Ind therapy Provider/Appt:  NA    Family therapy Provider/Appt:  NA   Other referrals:  Patient may need referral for pain clinic, PCP     Has vulnerable adult status change? N/A    Service Coordination:  Patient is scheduled for regular 1x1 sessions.     Supervision:  Patient is staffed with treatment providers, this includes: STEPHANY York and STEPHANY Pierre.     Attestation:   Moo Garber MD - Medical Director -  Provides oversight and supervision of care.      Are Treatment Plan goals/objectives effective? Yes  *If no, list changes to treatment plan:    Are the current goals meeting client's needs? Yes  *If no, list the changes to treatment plan.    Client Input / Response: Agree      *Client agrees with any changes to the treatment plan: Yes  *Client received copy of changes: Yes  *Client is aware of right to access a treatment plan review: Yes

## 2022-02-21 ENCOUNTER — VIRTUAL VISIT (OUTPATIENT)
Dept: ADDICTION MEDICINE | Facility: CLINIC | Age: 57
End: 2022-02-21
Attending: FAMILY MEDICINE
Payer: MEDICARE

## 2022-02-21 DIAGNOSIS — F11.20 OPIOID USE DISORDER, SEVERE, DEPENDENCE (H): Primary | ICD-10-CM

## 2022-02-21 PROCEDURE — 90853 GROUP PSYCHOTHERAPY: CPT | Mod: PO

## 2022-02-21 NOTE — GROUP NOTE
Group Therapy Documentation    PATIENT'S NAME: Roger Fallon  MRN:   [id number]  :   1965  ACCT. NUMBER: [id number]  DATE OF SERVICE: 22  START TIME:  8:30 AM  END TIME: 11:30 AM  FACILITATOR(S): Trista eBst LADC; Nevin Monet LADC  TOPIC: BEH Group Therapy  Number of patients attending the group:  6  Group Length:  3 Hours    Group Therapy Type: Addiction and Psychoeducation    Summary of Group / Topics Discussed:    Boundaries, Cognitive Therapy Techniques, Communication, Forgiveness, Meditation/Breathing Exercises, Mindfulness, and Relationships    Attestation:   Moo Garber MD - Medical Director - Provides oversight and supervision of care.     Video Visit:      Provider verified identity through the following two step process.  Patient provided:  Patient is known previously to provider and Patient was verified at admission/transfer    Telemedicine Visit: The patient's condition can be safely assessed and treated via synchronous audio and visual telemedicine encounter.      Reason for Telemedicine Visit: Services only offered telehealth    Originating Site (Patient Location): Patient's home    Distant Site (Provider Location): Provider Remote Setting- Home Office    Consent:  The patient/guardian has verbally consented to: the potential risks and benefits of telemedicine (video visit) versus in person care; bill my insurance or make self-payment for services provided; and responsibility for payment of non-covered services.     Patient would like the video invitation sent by:  Send to e-mail at: [email or web address]    Mode of Communication:  Video Conference via Medical Zoom    As the provider I attest to compliance with applicable laws and regulations related to telemedicine.  Group Attendance:  Attended group session    Patient's response to the group topic/interactions:  cooperative with task, discussed personal experience with topic, expressed understanding of topic, gave  appropriate feedback to peers and listened actively    Patient appeared to be Actively participating, Attentive and Engaged.        Client specific details:  Patrick discussed her son's visit her from [place] before traveling to [facility] for two months this weekend. She states that one knee gave out, though she did not fall. Patrick contacted the company that she ordered a C-pap machine from 3-months ago only to be informed that it will take another 6-months for it to be delivered. She states intentions on contacting her doctor to follow up with this. Patrick discussed that this morning's meditation stirred up emotions for her as she is still dealing with some feelings of guilt about not being able to get to her mother before she passed away. She states she is glad to be in this group.

## 2022-02-24 ENCOUNTER — DOCUMENTATION ONLY (OUTPATIENT)
Dept: ADDICTION MEDICINE | Facility: CLINIC | Age: 57
End: 2022-02-24
Payer: MEDICARE

## 2022-02-24 NOTE — PROGRESS NOTES
ABSENT NOTE:    Roger Fallon was absent from group 2/24/2022 . This absence was excused. The patient told counselors in previous week that she had a neurology appointment today. Patient is expected to be in group on 2/28/22.     STEPHANY Kenny  2/24/2022 , 11:11 AM

## 2022-02-25 ENCOUNTER — DOCUMENTATION ONLY (OUTPATIENT)
Dept: ADDICTION MEDICINE | Facility: CLINIC | Age: 57
End: 2022-02-25
Payer: MEDICARE

## 2022-02-25 NOTE — PROGRESS NOTES
[facility] Weekly Treatment Plan Review      ATTENDANCE for the following date span:  22-22    Date     Group Therapy 3.0 No group  hours No group hours No group hours  No group    Individual Therapy        Family Therapy        Other (Specify) Phase 1 &2  Phase 3  Phase 1  Phase 1 & 2             Patient had 1 absence during this time period due to having a neurology appointment.      Total hours: 156/108 phase 1 & 2 hours. See dimension 4 for extended programing description     Weekly Treatment Plan Review     Treatment Plan initiated on: 21.    Dimension1: Acute Intoxication/Withdrawal Potential -   Previous Dimension Ratin  Current Dimension Ratin  Date of Last Use: 21  Any reports of withdrawal symptoms: Yes, Patient has withdrawal symptoms if she does not take her Methadone at scheduled time  Narrative: Patient reports date of last use of marijuana to be 21. Reported use on 21 & 21 as well as 21 &21, she reported use from 21-21. She is on Methadone and finding benefit in that. Patrick reports sustained abstinence over the last week.  No change.     Dimension 2: Biomedical Conditions & Complications -   Previous Dimension Ratin  Current Dimension Ratin  Medical Concerns:  Patient has bad knees, she also is being monitored by PCP for biomedical concerns.   Current Medications & Medication Changes:  Current Outpatient Medications   Medication     escitalopram (LEXAPRO) 20 MG tablet     METFORMIN HCL PO     METHADONE HCL PO     OXcarbazepine (TRILEPTAL) 300 MG tablet     No current facility-administered medications for this visit.     Medication Prescriber:  PCP and MAT   Taking meds as prescribed? Yes  Medication side effects or concerns:  Withdrawal from Methadone if not dosed at appropriate time.   Outside medical appointments this week (list provider and reason for visit):  Neurology  appointment  Narrative: Patient reports both knees are in poor condition. Patient identified a need for knee replacements. She is working with providers to address her concerns and has met with a surgeon, tentative surgery date is in 2022. In the internum she is working on strengthening her knees by doing physical therapy exercises so she is ready for surgery when it available. Patrick's CPAP is still on recall with no replacement so she is not currently using that but not endorsing major sleep problems. Patrick reports she called to follow up on the CPAP and they told her they have no replacement or temporary machine for her, she is going to contact her doctor to see if she can get a prescription for a different machine. Her upcoming appointments are Dental for filling on 22.No emergent concerns at this time.     Dimension 3: Emotional/Behavioral Conditions & Complications -   Previous Dimension Ratin  Current Dimension Ratin  PHQ9   No flowsheet data found.  GAD7   No flowsheet data found.  Mental health diagnosis None   Date of last SIB:  NA  Date of  last SI:  NA  Date of last HI: NA  Behavioral Targets:  Develop coping skills to manage grief and loss.   Current MH Assignments:  Engage in mindfulness   Narrative:  Patrick has experienced significant grief and loss over the last year with her nephew, father and most recently her cousin. Patrick is seeing an individual therapist biweekly and finding benefit in that. She reports her mental health is stable, she attributed this to meeting with her therapist regularly and having good coping skills. Patrick did send this writer an e-mail and reported she was in a foul mood, she stated she would like additional time on 22 after group, I did let her know that I would not be in group and that she would be able to meet with STEPHANY York. Patrick continues to work on her impulsive behaviors as evidence by her maintained sobriety and behavioral changes. No  "emergent concerns endorsed at this time.     Dimension 4: Treatment Acceptance / Resistance -   Previous Dimension Ratin  Current Dimension Ratin  OCTAVIO Diagnosis:  304.30 (F12.20) Cannabis Use Disorder Severe     Opioid Use Disorder, Specify if:  On a maintenance therapy with a severity of:  304.00 (F11.20) Severe  Stage: 2  Commitment to tx process/Stage of change: Action  OCTAVIO assignments:  Initial relapse prevention plan and goodbye letter.   Narrative: Pratima was on an extended phase 1 program due to frequent relapses. She is on a treatment contract at this time. She did complete her relapse prevention plan and we will continue to follow up with her on alterations. Pratima is currently in phase 2, she is an active and engaged group member. Pratima will be transitioning to phase 3 on 3/8/22, she will be preparing to present her goodbye letter in group on 3/7/22.      Dimension 5: Relapse / Continued Problem Potential -   Previous Dimension Rating:  3  Current Dimension Rating:  3  Relapses this week: None over the last week   Urges to use:  None  UA results: No results found for this or any previous visit (from the past 168 hour(s)).  Narrative: Pratima is working on identifying triggers, she initially identified pain as a primary trigger however over the last month it has been identified that she has other triggers as well and few coping skills to manage. She has began to gain insight as to what coping skills are working for her and what ones are not. This has lead her to implement them more regularly and find benefit from that. No relapses, urges or triggers endorsed over the last week and pratima attributed that to staying busy. Her affirmation this week was \"I will put in the work for recovery\"     Dimension 6: Recovery Environment -   Previous Dimension Ratin  Current Dimension Ratin  Family Involvement : None    Summarize attendance at family groups and family sessions: NA  Family supportive of " treatment?  Yes  Community support group attendance: [facility]   Recreational activities: spending time with her grandchildren   Narrative: Patient has support from her family and that her son calls her daily. Patrick recently began attending sober support meetings and reports benefit in them, she did report she is looking for meetings that have less of a spirituality component and has found that in [employer]. She reports she did attend 1 in the rooms meeting over the last week and found enjoyment in that. She reports spending a lot of time with her grandchildren and really enjoying that along with quality time with her adult children. Patient reports no current legal concerns. Patrick reports she started her "[employer]" project and is finding enjoyment in that along with other hobbies.     TOPIC  DATES   8 Dimensions of Wellness 9/6/21-9/10/21 & 11/8/21-11/12/21 & 1/10/22-1/14/22 & 1/17/22-1/21/22   Medical Aspects  9/13-9/16/21 & 11/15/21-11/19/21, 1/31-2/3,   Emotional Wellbeing/Mental Health  9/20-9/24/21, 11/22/21, 1/24-1/27/22   Acceptance  9/20/21-9/30/21 & 11/29 2/7/22-2/11/22, 2/14/22-2/18/22   Relationships  10/4/21-10/8/21 & 12/5/21-12/10/21, 2/21/22-2/28/22   Relapse Prevention  10/18/21-10/22/21, 10,25, 10/27, 10/28 & 12/20/21-12/24/21, 12/27/21-12/31/21   Sober Structure  10/11/21-10/15/21 & 12/13/21-12/17/21   Grief and Loss  8/30/21-9/2/21 &11/1/21-11/5/21 & 1/3/22-1/7/22     Justification for Continued Treatment at this Level of Care: Patient is at high risk for relapse. Patient has pain and that leads to relapse. She is gaining insight to relapse process as well as developing coping skills.     Discharge Planning:  Target Discharge Date/Timeframe:  4/22/22   Med Mgmt Provider/Appt:  Daily due to MAT    Ind therapy Provider/Appt:  NA    Family therapy Provider/Appt:  NA   Other referrals:  Patient may need referral for pain clinic, PCP     Has vulnerable adult status change? N/A    Service  Coordination:  Patient is offered     Supervision:  Patient is staffed with treatment providers, this includes: STEPHANY York and STEPHANY Pierre.     Attestation:   Moo Garber MD - Medical Director - Provides oversight and supervision of care.      Are Treatment Plan goals/objectives effective? Yes  *If no, list changes to treatment plan:    Are the current goals meeting client's needs? Yes  *If no, list the changes to treatment plan.    Client Input / Response: Agree      *Client agrees with any changes to the treatment plan: Yes  *Client received copy of changes: Yes  *Client is aware of right to access a treatment plan review: Yes

## 2022-02-28 ENCOUNTER — VIRTUAL VISIT (OUTPATIENT)
Dept: ADDICTION MEDICINE | Facility: CLINIC | Age: 57
End: 2022-02-28
Attending: FAMILY MEDICINE
Payer: MEDICARE

## 2022-02-28 DIAGNOSIS — F11.20 OPIOID USE DISORDER, SEVERE, DEPENDENCE (H): Primary | ICD-10-CM

## 2022-02-28 PROCEDURE — 90853 GROUP PSYCHOTHERAPY: CPT | Mod: PO

## 2022-02-28 NOTE — GROUP NOTE
Group Therapy Documentation    PATIENT'S NAME: Roger Fallon  MRN:   [id number]  :   1965  ACCT. NUMBER: [id number]  DATE OF SERVICE: 22  START TIME:  8:30 AM  END TIME: 11:30 AM  FACILITATOR(S): Trista Best LADC  TOPIC: BEH Group Therapy  Number of patients attending the group:  5  Group Length:  3 Hours    Group Therapy Type: Addiction and Psychoeducation    Summary of Group / Topics Discussed:    Balanced Lifestyle , Cognitive Therapy Techniques, Coping Skills/Lifestyle Managemet, Meditation/Breathing Exercises, Mindfulness, Self-Care Activities, Time Management, and Importance of Sober Structure.    Attestation:   Moo Garber MD - Medical Director - Provides oversight and supervision of care.      Video Visit:      Provider verified identity through the following two step process.  Patient provided:  Patient is known previously to provider and Patient was verified at admission/transfer    Telemedicine Visit: The patient's condition can be safely assessed and treated via synchronous audio and visual telemedicine encounter.      Reason for Telemedicine Visit: Services only offered telehealth    Originating Site (Patient Location): Patient's home    Distant Site (Provider Location): Provider Remote Setting- Home Office    Consent:  The patient/guardian has verbally consented to: the potential risks and benefits of telemedicine (video visit) versus in person care; bill my insurance or make self-payment for services provided; and responsibility for payment of non-covered services.     Patient would like the video invitation sent by:  Send to e-mail at: [email or web address]    Mode of Communication:  Video Conference via Medical Zoom    As the provider I attest to compliance with applicable laws and regulations related to telemedicine.  Group Attendance:  Attended group session    Patient's response to the group topic/interactions:  cooperative with task, discussed personal experience with  "topic, expressed readiness to alter behaviors, expressed understanding of topic, gave appropriate feedback to peers and listened actively    Patient appeared to be Actively participating, Attentive and Engaged.        Client specific details:  Patrick discussed that she fell in the bathroom stall of a store bathroom due to inability to bend her knee last Wednesday on 2/23/22. She reports she called her son to help her. Patrick described since then she has been struggling with \"extreme and overwhelming pain\" in her knees that led her to relapsing on 0.5 of heroin. Time was spent discussing this with Patrick becoming very emotional. She accepted feedback and support from her peers and met briefly with this writer following group to further discuss concerns. Patrick has an appointment for x-rays today and will meet with her orthopedic surgeon tomorrow. She was informed that this writer will be following up with her this week, and will be staffing this with colleagues and supervisor.       "

## 2022-03-03 ENCOUNTER — VIRTUAL VISIT (OUTPATIENT)
Dept: ADDICTION MEDICINE | Facility: CLINIC | Age: 57
End: 2022-03-03
Attending: FAMILY MEDICINE
Payer: MEDICARE

## 2022-03-03 DIAGNOSIS — F11.20 OPIOID USE DISORDER, SEVERE, DEPENDENCE (H): Primary | ICD-10-CM

## 2022-03-03 PROCEDURE — 90853 GROUP PSYCHOTHERAPY: CPT | Mod: PO

## 2022-03-03 NOTE — GROUP NOTE
Group Therapy Documentation    PATIENT'S NAME: Roger Fallon  MRN:   [id number]  :   1965  ACCT. NUMBER: [id number]  DATE OF SERVICE: 3/03/22  START TIME:  8:30 AM  END TIME: 11:30 AM  FACILITATOR(S): Trista Best LADC  TOPIC: BEH Group Therapy  Number of patients attending the group:  6  Group Length:  3 Hours    Group Therapy Type: Addiction and Psychoeducation    Summary of Group / Topics Discussed:    Balanced Lifestyle , Cognitive Therapy Techniques, Choices in Recovery, Leisure Exploration/Use of Leisure Time, Mindfulness, Proper Nutrition & Exercise, Self-Care Activities, Time Management, and Sober Structure: Importance of Routine in Early Recovery.    Attestation:   Moo Garber MD - Medical Director - Provides oversight and supervision of care.     Video Visit:      Provider verified identity through the following two step process.  Patient provided:  Patient is known previously to provider and Patient was verified at admission/transfer    Telemedicine Visit: The patient's condition can be safely assessed and treated via synchronous audio and visual telemedicine encounter.      Reason for Telemedicine Visit: Services only offered telehealth    Originating Site (Patient Location): Patient's home    Distant Site (Provider Location): Provider Remote Setting- Home Office    Consent:  The patient/guardian has verbally consented to: the potential risks and benefits of telemedicine (video visit) versus in person care; bill my insurance or make self-payment for services provided; and responsibility for payment of non-covered services.     Patient would like the video invitation sent by:  Send to e-mail at: [email or web address]    Mode of Communication:  Video Conference via Medical Zoom    As the provider I attest to compliance with applicable laws and regulations related to telemedicine.    Group Attendance:  Attended group session    Patient's response to the group topic/interactions:   cooperative with task, discussed personal experience with topic, expressed readiness to alter behaviors, expressed understanding of topic, gave appropriate feedback to peers and listened actively    Patient appeared to be Actively participating, Attentive and Engaged.        Client specific details:  Today Patrick described that she is not experiencing as much pain as she was before, and has been staying off her legs as much as possible. She states she  mistakenly went to her PT appt on Monday instead of her orthopedic appt., though was rescheduled to meet with the surgeon at 1:00pm today. She discussed being in a better place mentally and emotionally after brief meeting with this writer following group session on Monday regarding her recent return to use, also because her sister gifted her with a birthstone necklace with a vile that contained some of her mother's ashes. Patrick agreed to meet again following group to discuss contacting her MAT counselor today, also to discuss conditions of her being given an opportunity to continue TX, which will most likely include family involvement upon counselor Nevin Monet return next week.

## 2022-03-04 ENCOUNTER — DOCUMENTATION ONLY (OUTPATIENT)
Dept: ADDICTION MEDICINE | Facility: CLINIC | Age: 57
End: 2022-03-04
Payer: MEDICARE

## 2022-03-04 NOTE — PROGRESS NOTES
[facility] Weekly Treatment Plan Review      ATTENDANCE for the following date span:  22-3/3/22    Date     Group Therapy 3.0 No group  hours No group hours 3.0  No group    Individual Therapy        Family Therapy        Other (Specify) Phase 1 & 2  Phase 3  Phase 1  Phase 1 & 2             Patient had 0 absences during this time period.      Total hours: 162/108 phase 1 & 2 hours. See dimension 4 for extended programing description     Weekly Treatment Plan Review     Treatment Plan initiated on: 21.    Dimension1: Acute Intoxication/Withdrawal Potential -   Previous Dimension Ratin  Current Dimension Ratin  Date of Last Use: 21  Any reports of withdrawal symptoms: Yes, Patient has withdrawal symptoms if she does not take her Methadone at scheduled time  Narrative: Patient reports date of last use of marijuana to be 21. Reported use on 21 & 21 as well as 21 &21, she reported use from 21-21. Patient reports return to use on 3 separate days and her cisco was on 22. (See dim 2 for description). She is on Methadone and is currently reporting that the dose she receives is no longer effective for pain management. Patrick was informed that this would be staffed with supervisor and counseling team. No significant withdrawal symptoms have been reported.       Dimension 2: Biomedical Conditions & Complications -   Previous Dimension Ratin  Current Dimension Ratin  Medical Concerns:  Patient has bad knees, she also is being monitored by PCP for biomedical concerns.   Current Medications & Medication Changes:  Current Outpatient Medications   Medication     escitalopram (LEXAPRO) 20 MG tablet     METFORMIN HCL PO     METHADONE HCL PO     OXcarbazepine (TRILEPTAL) 300 MG tablet     No current facility-administered medications for this visit.     Medication Prescriber:  PCP and MAT   Taking meds as prescribed?  "Yes  Medication side effects or concerns:  Withdrawal from Methadone if not dosed at appropriate time.   Outside medical appointments this week (list provider and reason for visit):  Neurology appointment  Narrative: Patient reports both knees are in poor condition. Patient identified a need for knee replacements. She is working with providers to address her concerns and has met with a surgeon, tentative surgery date is in 6/2022. In the internum she is working on strengthening her knees by doing physical therapy exercises so she is ready for surgery when it available. Patrick's CPAP is still on recall with no replacement so she is not currently using that but not endorsing major sleep problems. Patrick reports she called to follow up on the CPAP and they told her they have no replacement or temporary machine for her, she is going to contact her doctor to see if she can get a prescription for a different machine. Patrick discussed that she fell in the bathroom stall of a store when she was unable to bend her knee last Tuesday or Wednesday on 2/22 or 2/23/23. She reports she called her son on the phone to come to help her. Patrick described struggling with \"extreme and overwhelming pain\" in her knees in the days that followed, and that she used 0.5 of heroin for relief. This week, she reports that she has been staying off her legs as much as possible and is not experiencing as much pain as she was before. Patrick informed this writer that she  mistakenly went to her PT appt on Monday instead of her orthopedic appt., though was rescheduled to meet with the surgeon at 1:00pm on 3/3/22. During brief discussion following group on 3/3/22, Patrick stated that her MAT dose of 105 mg is not enough to for pain management. We discussed that she needs jarek contact her counselor at [facility] to discuss her concerns and report her recent use. We discussed ways in which counselors can best support her with coordinating services with ortho surgeon and " MAT counselor. Patrick did sign a ABRAM for surgeon this week. Counselors will follow up.    Dimension 3: Emotional/Behavioral Conditions & Complications -   Previous Dimension Ratin  Current Dimension Ratin  PHQ9   No flowsheet data found.  GAD7   No flowsheet data found.  Mental health diagnosis None   Date of last SIB:  NA  Date of  last SI:  NA  Date of last HI: NA  Behavioral Targets:  Develop coping skills to manage grief and loss.   Current MH Assignments:  Engage in mindfulness   Narrative:  Patrick has experienced significant grief and loss over the last year with her nephew, father and most recently her cousin. Patrick is seeing an individual therapist biweekly and finding benefit in that. She reports her mental health has been mostly stable, since meeting with her therapist regularly and working on coping skills. Patrick did send this writer an e-mail and reported she was in a foul mood, she stated she would like additional time on 22 after group, This writer Patrick will be encouraged  to continue working on her impulsive behaviors. During group, time was spent discussing her return to use. Patrick s was very emotional and willing to accept feedback from her peers, then met briefly with this writer following group to further discuss concerns. During the following group session she described being in a better place mentally and emotionally after meeting with this writer following group session on Monday, then thanked peers for their support. She also reported that her sister gifted her with a birthstone necklace with a vile that contained some of her mother's ashes and felt comfort in that. Patrick reports that last weeks therapy session was canceled without her knowledge and she is now scheduled to meet with her therapist weekly on .    Dimension 4: Treatment Acceptance / Resistance -   Previous Dimension Ratin  Current Dimension Ratin  OCTAVIO Diagnosis:  304.30 (F12.20) Cannabis Use Disorder  Severe     Opioid Use Disorder, Specify if:  On a maintenance therapy with a severity of:  304.00 (F11.20) Severe  Stage: 2  Commitment to tx process/Stage of change: Action  OCTAVIO assignments:  Initial relapse prevention plan and goodbye letter.   Narrative: Patrick was on an extended phase 1 program due to frequent relapses. She is on a treatment contract at this time. She did complete her relapse prevention plan and we will continue to follow up with her on alterations. Patrick is currently in phase 2, she is an active and engaged group member. Patrick was scheduled to transition to phase 3 on 3/8/22, however, due to the recent event, this writer will staff with counselor Nevin Monet to determine options and next plan of action.  She is will be scheduled to meet with counselors to discuss next week. Patrick participated in this week's education topic on Sober Structure, with discussions on the What Sober Structure Looks Like and Importance of Establishing Healthy Daily Routines.     Dimension 5: Relapse / Continued Problem Potential -   Previous Dimension Rating:  3  Current Dimension Rating:  3  Relapses this week: None over the last week   Urges to use:  None  UA results: No results found for this or any previous visit (from the past 168 hour(s)).  Narrative: Patrick is working on identifying triggers, she initially identified pain as a primary trigger and was able to manage those triggers until last week. She does have some insight as to what coping skills are working for her and what ones are not. Patrick is being encouraged to reach out for support from family members and counselors when experiencing triggers and urges, and before succumbing to her addiction. She verbalized agreement to accept more assistance from them regarding household tasks and running errands regularly in order for her to allow her legs to rest. We discussed her disregarding  counselor recommendations for self-care to allow others to help her rather  than pushing through the pain is something that she needs to work on for relapse prevention.    Dimension 6: Recovery Environment -   Previous Dimension Ratin  Current Dimension Ratin  Family Involvement : None    Summarize attendance at family groups and family sessions: NA  Family supportive of treatment?  Yes  Community support group attendance: [facility]   Recreational activities: spending time with her grandchildren   Narrative: Patient has support from her family and that her son calls her daily. Patrick recently began attending sober support meetings and reports benefit in them, she did report she is looking for meetings that have less of a spirituality component and has found that in [facility]. She reports she did attend 1 in the rooms meeting over the last week and found enjoyment in that. She reports spending a lot of time with her grandchildren and really enjoying that along with quality time with her adult children. Patient reports no current legal concerns. Patrick verbalized agreement to involve her family more actively in her treatment and also to sign a ABRAM for counselors to meet with her and her adult children for a family session.     TOPIC  DATES   8 Dimensions of Wellness 21-9/10/21 & 21-21 & 1/10/22-22 & 22-22   Medical Aspects  -21 & 11/15/21-21, -2/3,   Emotional Wellbeing/Mental Health  -21, 21, -22   Acceptance  21-21 & 22-22, 22-22   Relationships  10/4/21-10/8/21 & 21-12/10/21, 22-22   Relapse Prevention  10/18/21-10/22/21, 10,25, 10/27, 10/28 & 21-21, 21-21   Sober Structure  10/11/21-10/15/21, 21-21, -3/3/22   Grief and Loss  21-21 &21-21 & 1/3/22-22     Justification for Continued Treatment at this Level of Care: Patient is at high risk for relapse. Patient has pain and that leads to relapse.  She is gaining insight to relapse process as well as developing coping skills. Patient did return to using over 3 days, stating receiving an injury after falling on 2/22/22 citing excruciating pain that followed. She is reporting last use was on 2/27/22. Patient signed ABRAM for her orthopedic surgeon for counselors communication and coordination of care services.     Discharge Planning:  Target Discharge Date/Timeframe:  4/22/22   Med Mgmt Provider/Appt:  Daily due to MAT    Ind therapy Provider/Appt:  NA    Family therapy Provider/Appt:  NA   Other referrals:  Patient may need referral for pain clinic, PCP     Has vulnerable adult status change? N/A    Service Coordination:  Brad BAXTER counselor, [facility] surgeon.     Supervision:  Patient is staffed with treatment providers, this includes: STEPHANY York and STEPHANY Pierre.     Attestation:   Moo Garber MD - Medical Director - Provides oversight and supervision of care.      Are Treatment Plan goals/objectives effective? Yes  *If no, list changes to treatment plan:    Are the current goals meeting client's needs? Yes  *If no, list the changes to treatment plan.    Client Input / Response: Agree      *Client agrees with any changes to the treatment plan: Yes  *Client received copy of changes: Yes  *Client is aware of right to access a treatment plan review: Yes

## 2022-03-08 ENCOUNTER — TELEPHONE (OUTPATIENT)
Dept: ADDICTION MEDICINE | Facility: CLINIC | Age: 57
End: 2022-03-08
Payer: MEDICARE

## 2022-03-08 NOTE — TELEPHONE ENCOUNTER
Writer updated arline counselor on options currently being considered as Patrick continues to use despite warnings of consequences of possible discharge from [facility] and scheduled knee surgery being extended. Informed Cee that she is on an abstinence contract, though has been allowed to remain in SRP [place] for an extended time to continue to support her in hopes that she would comply with [place] requirements. Current consideration may be to allow her to transfer to phase 3 with final warning of immediate DC if she uses again.  We discussed that Patrick reports she has made some effort to attend [facility] sober meetings, however continues to struggle utilizing learned coping skills and supports before making the decision to use heroin for pain relief. Cee was informed that counselors have worked closely with Patrick to support her with resources and recommendations to focus more on her own self-care. Cee informed that Pt's dose will not be increased at this time, and both agreed that Patrick's environment does not appear to be conducive to her ability to remain sober, and it may be more benefitial her to enter inpatient or residential [place]. We discussed that Kiel currently has a lengthy wait list for Medicare. She states that she will check with her co-worker Kimi to see if she is aware of another option for [facility] [place]. This writer informed that I would request Nevin Monet [facility] to call her to discuss further.

## 2022-03-10 ENCOUNTER — VIRTUAL VISIT (OUTPATIENT)
Dept: ADDICTION MEDICINE | Facility: CLINIC | Age: 57
End: 2022-03-10
Attending: FAMILY MEDICINE
Payer: MEDICARE

## 2022-03-10 DIAGNOSIS — F11.20 OPIOID USE DISORDER, SEVERE, DEPENDENCE (H): Primary | ICD-10-CM

## 2022-03-10 PROCEDURE — H2035 A/D TX PROGRAM, PER HOUR: HCPCS | Mod: HQ,GT

## 2022-03-10 NOTE — GROUP NOTE
Group Therapy Documentation    PATIENT'S NAME: Roger Fallon  MRN:   [id number]  :   1965  ACCT. NUMBER: [id number]  DATE OF SERVICE: 3/10/22  START TIME:  8:30 AM  END TIME: 11:30 AM  FACILITATOR(S): Nevin Monet LADC  TOPIC: BEH Group Therapy  Number of patients attending the group:  6  Group Length:  3 Hours    Group Therapy Type: Addiction and Psychoeducation    Summary of Group / Topics Discussed:    Cognitive Therapy Techniques, Meditation/Breathing Exercises, and Relapse Prevention .     Attestation:   Moo Garber MD - Medical Director - Provides oversight and supervision of care.    Video Visit:      Provider verified identity through the following two step process.  Patient provided:  Patient is known previously to provider and Patient was verified at admission/transfer    Telemedicine Visit: The patient's condition can be safely assessed and treated via synchronous audio and visual telemedicine encounter.      Reason for Telemedicine Visit: Services only offered telehealth    Originating Site (Patient Location): Patient's home    Distant Site (Provider Location): Provider Remote Setting- Home Office    Consent:  The patient/guardian has verbally consented to: the potential risks and benefits of telemedicine (video visit) versus in person care; bill my insurance or make self-payment for services provided; and responsibility for payment of non-covered services.     Patient would like the video invitation sent by:  Send to e-mail at: [email or web address]    Mode of Communication:  Video Conference via Medical Zoom    As the provider I attest to compliance with applicable laws and regulations related to telemedicine.    Group Attendance:  Attended group session    Patient's response to the group topic/interactions:  cooperative with task and discussed personal experience with topic    Patient appeared to be Actively participating, Attentive and Engaged.        Client specific details:   Patrick shared her pain management techniques and the benefits of them over the last week. She reports by using her prescribed topical pain relief she has noticed a decrease to use opiates for temporary relief. Patrick shared she is hoping to begin exploring sober support meetings and is hoping to start looking for a sponsor.

## 2022-03-11 ENCOUNTER — DOCUMENTATION ONLY (OUTPATIENT)
Dept: ADDICTION MEDICINE | Facility: CLINIC | Age: 57
End: 2022-03-11
Payer: MEDICARE

## 2022-03-11 NOTE — PROGRESS NOTES
[facility] Weekly Treatment Plan Review    ATTENDANCE for the following date span:  3/7/22-3/13/22    Date     Group Therapy 0 No group  hours No group hours 3.0  No group    Individual Therapy        Family Therapy        Other (Specify) Phase 1 & 2  Phase 3  Phase 1  Phase 1 & 2             Patient had 1 unexcused absences during this time period.     Total hours: 165/108 phase 1 & 2 hours. See dimension 4 for extended programing description     Weekly Treatment Plan Review     Treatment Plan initiated on: 21.    Dimension1: Acute Intoxication/Withdrawal Potential -   Previous Dimension Ratin  Current Dimension Ratin  Date of Last Use: 22  Any reports of withdrawal symptoms: Yes, Patient has withdrawal symptoms if she does not take her Methadone at scheduled time  Narrative: Patient reports date of last use of marijuana to be 21. Reported use on 21 & 21 as well as 21 &21, she reported use from 21-21. Date of last use was 22. No use endorsed over the last week.     Dimension 2: Biomedical Conditions & Complications -   Previous Dimension Ratin  Current Dimension Ratin  Medical Concerns:  Patient has bad knees, she also is being monitored by PCP for biomedical concerns.   Current Medications & Medication Changes:  Current Outpatient Medications   Medication     escitalopram (LEXAPRO) 20 MG tablet     METFORMIN HCL PO     METHADONE HCL PO     OXcarbazepine (TRILEPTAL) 300 MG tablet     No current facility-administered medications for this visit.     Medication Prescriber:  PCP and MAT   Taking meds as prescribed? Yes  Medication side effects or concerns:  Withdrawal from Methadone if not dosed at appropriate time.   Outside medical appointments this week (list provider and reason for visit):  Orthopedics appointment and physical therapy  Narrative: Patient reports both knees are in poor condition.  Patient identified a need for knee replacements. She is working with providers to address her concerns and has met with a surgeon, tentative surgery date is in 2022. In the internum she is working on strengthening her knees by doing physical therapy exercises so she is ready for surgery when it available. Patrick's CPAP is still on recall with no replacement although she has reached out to her providers to help with this issue. Patrick attended her physical therapy appointment this week and found benefit in that. She reports that the exercises and stretches have been helping her. Patrick reports she was provided with Diclofenac Sodium topical cream for pain relief and has been finding that beneficial. She also has been taking an antiinflammatory and finding that helpful. We discussed pain and also discussed that 100% pain relief may not be realistic at this time and that having a goal of management is within her capabilities with medication and physical therapy. Patrick was in agreement that and identified ways she can continue to find relief.     Dimension 3: Emotional/Behavioral Conditions & Complications -   Previous Dimension Ratin  Current Dimension Ratin  PHQ9   No flowsheet data found.  GAD7   No flowsheet data found.  Mental health diagnosis None   Date of last SIB:  NA  Date of  last SI:  NA  Date of last HI: NA  Behavioral Targets:  Develop coping skills to manage grief and loss.   Current  Assignments:  Engage in mindfulness   Narrative:  Patrick has experienced significant grief and loss over the last year with her nephew, father and most recently her cousin. Patrick is seeing an individual therapist biweekly and finding benefit in that. She reports her mental health has been mostly stable, since meeting with her therapist regularly and working on coping skills. Patrick reports an increase in mood over the last week and reported a more positive outlook on her life. Patrick does continue to struggle with her  impulsive behaviors, she has few coping skills to utilize when impulsive thoughts/behaviors arise so we will work on this in individual settings.     Dimension 4: Treatment Acceptance / Resistance -   Previous Dimension Ratin  Current Dimension Ratin  OCTAVIO Diagnosis:  304.30 (F12.20) Cannabis Use Disorder Severe     Opioid Use Disorder, Specify if:  On a maintenance therapy with a severity of:  304.00 (F11.20) Severe  Stage: 2  Commitment to tx process/Stage of change: Action  OCTAVIO assignments:  Initial relapse prevention plan and goodbye letter.   Narrative: Patrick was on an extended phase 1 program due to frequent relapses. She is on a treatment contract at this time. She did complete her relapse prevention plan and at this time is working on updating it as she had a relapse. Patrick will have her treatment plan and treatment contract updated and plans to transition to phase 3 at the end of March.     Dimension 5: Relapse / Continued Problem Potential -   Previous Dimension Rating:  3  Current Dimension Rating:  3  Relapses this week: None over the last week   Urges to use:  None  UA results: No results found for this or any previous visit (from the past 168 hour(s)).  Narrative: Patrick is working on identifying triggers, she initially identified pain as a primary trigger and was able to manage those triggers until last week. She does have some insight as to what coping skills are working for her and what ones are not. Patrick is being encouraged to reach out for support from family members and counselors when experiencing triggers and urges, and before succumbing to her addiction. She verbalized agreement to accept more assistance from them regarding household tasks and running errands regularly in order for her to allow her legs to rest. Patrick has understanding of relapse prevention techniques and understands how to apply them. During times of high stress and pain she identifies she does not use her relapse  prevention plan and coping skills to her best ability and is working on identifying why that is.     Dimension 6: Recovery Environment -   Previous Dimension Ratin  Current Dimension Ratin  Family Involvement : None    Summarize attendance at family groups and family sessions: NA  Family supportive of treatment?  Yes  Community support group attendance: In The Rooms   Recreational activities: spending time with her grandchildren   Narrative: Patient has support from her family and that her son calls her daily. Patrick recently began attending sober support meetings and reports benefit in them, she did report she is looking for meetings that have less of a spirituality component and has found that in [facility]. She reports she did attend 1 in the rooms meeting over the last week and found enjoyment in that. She identified that she is in need of a sponsor so will begin looking for one while in meetings over the next week. Patient reports no current legal concerns.While Patrick has a good routine and structure she does struggle with finding time to relax and making time for self care. This is an important aspect of her recovery due to her pain and lack of self care leading to relapse.     TOPIC  DATES   8 Dimensions of Wellness 21-9/10/21 & 21-21 & 1/10/22-22 & 22-22   Medical Aspects  -21 & 11/15/21-21, -2/3,   Emotional Wellbeing/Mental Health  -21, 21, -22   Acceptance  21-21 & 22-22, 22-22   Relationships  10/4/21-10/8/21 & 21-12/10/21, 22-22   Relapse Prevention  10/18/21-10/22/21, 10,25, 10/27, 10/28 & 21-21, 21-21, 3/7/22-3/11/22   Sober Structure  10/11/21-10/15/21, 21-21, -3/3/22   Grief and Loss  21-21 &21-21 & 1/3/22-22     Justification for Continued Treatment at this Level of Care: Patient is at high risk for  relapse. Patient has pain and that leads to relapse. She is gaining insight to relapse process as well as developing coping skills. Patient did return to using over 3 days, stating receiving an injury after falling on 2/22/22 citing excruciating pain that followed. She is reporting last use was on 2/27/22. Patient signed ABRAM for her orthopedic surgeon for counselors communication and coordination of care services.     Discharge Planning:  Target Discharge Date/Timeframe:  4/22/22   Med Mgmt Provider/Appt:  Daily due to MAT    Ind therapy Provider/Appt:  NA    Family therapy Provider/Appt:  NA   Other referrals:  Patient may need referral for pain clinic, PCP     Has vulnerable adult status change? N/A    Service Coordination:  Brad BAXTER counselor, [facility] surgeon.     Supervision:  Patient is staffed with treatment providers, this includes: STEPHANY York and STEPHANY Pierre.     Attestation:   Moo Garber MD - Medical Director - Provides oversight and supervision of care.    Are Treatment Plan goals/objectives effective? Yes  *If no, list changes to treatment plan:    Are the current goals meeting client's needs? Yes  *If no, list the changes to treatment plan.    Client Input / Response: Agree      *Client agrees with any changes to the treatment plan: Yes  *Client received copy of changes: Yes  *Client is aware of right to access a treatment plan review: Yes

## 2022-03-14 ENCOUNTER — VIRTUAL VISIT (OUTPATIENT)
Dept: ADDICTION MEDICINE | Facility: CLINIC | Age: 57
End: 2022-03-14
Attending: FAMILY MEDICINE
Payer: MEDICARE

## 2022-03-14 DIAGNOSIS — F11.20 OPIOID USE DISORDER, SEVERE, DEPENDENCE (H): Primary | ICD-10-CM

## 2022-03-14 PROCEDURE — H2035 A/D TX PROGRAM, PER HOUR: HCPCS | Mod: HQ,GT

## 2022-03-14 NOTE — GROUP NOTE
Group Therapy Documentation    PATIENT'S NAME: Roger Fallon  MRN:   [id number]  :   1965  ACCT. NUMBER: [id number]  DATE OF SERVICE: 3/14/22  START TIME:  8:30 AM  END TIME: 11:30 AM  FACILITATOR(S): Nevin Monet LADC  TOPIC: BEH Group Therapy  Number of patients attending the group:  5  Group Length:  3 Hours    Group Therapy Type: Addiction and Psychoeducation    Summary of Group / Topics Discussed:    Cognitive Therapy Techniques, Coping Skills/Lifestyle Managemet, and Meditation/Breathing Exercises    Attestation:   Moo Garber MD - Medical Director - Provides oversight and supervision of care.      Video Visit:      Provider verified identity through the following two step process.  Patient provided:  Patient is known previously to provider and Patient was verified at admission/transfer    Telemedicine Visit: The patient's condition can be safely assessed and treated via synchronous audio and visual telemedicine encounter.      Reason for Telemedicine Visit: Services only offered telehealth    Originating Site (Patient Location): Patient's home    Distant Site (Provider Location): Provider Remote Setting- Home Office    Consent:  The patient/guardian has verbally consented to: the potential risks and benefits of telemedicine (video visit) versus in person care; bill my insurance or make self-payment for services provided; and responsibility for payment of non-covered services.     Patient would like the video invitation sent by:  Send to e-mail at: [email or web address]    Mode of Communication:  Video Conference via Medical Zoom    As the provider I attest to compliance with applicable laws and regulations related to telemedicine.    Group Attendance:  Attended group session however was 1 hour late.     Patient's response to the group topic/interactions:  cooperative with task and discussed personal experience with topic    Patient appeared to be Actively participating and Engaged.         Client specific details:  Patrick shared that she had a busy morning and that she was late to group due to another appointment. She reported she was feeling very grateful for her pain relief cream for her knees and is starting to take things a bit more easy. She continues to acknowledge that she needs to slow down and is working on that.

## 2022-03-16 DIAGNOSIS — F11.20 OPIOID USE DISORDER, SEVERE, DEPENDENCE (H): Primary | ICD-10-CM

## 2022-03-17 ENCOUNTER — DOCUMENTATION ONLY (OUTPATIENT)
Dept: ADDICTION MEDICINE | Facility: CLINIC | Age: 57
End: 2022-03-17
Payer: MEDICARE

## 2022-03-17 ENCOUNTER — VIRTUAL VISIT (OUTPATIENT)
Dept: ADDICTION MEDICINE | Facility: CLINIC | Age: 57
End: 2022-03-17
Attending: FAMILY MEDICINE
Payer: MEDICARE

## 2022-03-17 DIAGNOSIS — F11.20 OPIOID USE DISORDER, SEVERE, DEPENDENCE (H): Primary | ICD-10-CM

## 2022-03-17 PROCEDURE — H2035 A/D TX PROGRAM, PER HOUR: HCPCS | Mod: HQ,GT

## 2022-03-17 NOTE — PROGRESS NOTES
[facility]  Adult Substance Use Disorder Program  Treatment Plan     These services are provided by the facility for each patient/client according to the individual's treatment plan:    Individual and group counseling    Education    Transition services    Services to address any co-occurring mental illness    Service coordination    Criteria for discharge:  Patients/clients are discharged from the program following completion of the entire program including all phases of treatment or acceptance of other post-treatment referrals such as sober supportive living, or aftercare at other facilities.  Patients/clients may also be discharged for inappropriate behavior or substance use.      Favorable Discharge - Patients/clients have completed agreed upon treatment goals, understand their diagnosis and appear motivated for continued recovery.    Guarded Discharge - Patients/clients have demonstrated some understanding of their diagnosis and recovery process, and have completed some of their treatment goals.  This prognosis also includes patients/clients who have completed some treatment goals but have not made commitment to community support or follow through with referrals.    Unfavorable Discharge - Patients/clients have not completed agreed upon treatment goals due to their own choice, have limited understanding of their diagnosis, and have shown minimal or inconsistent behavior conducive to recovery.  Those patients/clients discharged due to behavioral problems will also be unfavorable discharges.    Adult OCTAVIO Treatment Plan                                Patient Name: Roger Fallon  MRN: [id number]  : 1965  56 year old   Identified Gender: female  Admit Date: 21  Current Treatment Phase: 2  Last Updated: 3/17/22    SUBSTANCE USE DISORDER(S): 304.00 (F11.20) Opioid Use Disorder Severe      Dimension 1: Acute Intoxication/Withdrawal Potential, Risk Level: 1    Needs identified  from Comprehensive Assessment Summary: Patient reports last use of heroin and marijuana as 08/31/2021. Patient reports minimal withdrawal symptoms at this time. Patient is on MAT-Methadone. Patient likely to experience withdrawal symptoms if Methadone were abruptly discontinued  Update: 12/3/21: Patrick is taking medications as prescribed. Although has reported use since intake she reports no withdrawal concerns.   12/9/21: Patrick endorsed use of opiates on 12/1/21 & 12/2/21 due to significant pain. She reports no current withdrawal concerns and did reach out to her Methadone counselor as well. No detox services needed at this time.   3/17/22: Patrick reported heroin use 3x since last update with last use being on 2/27/22. Patrick did not endorse any withdrawal concerns and since has maintained abstinence.     Date of last use: 2/27/22  Patient currently receiving medication assisted therapy (MAT): Yes  Current or recent withdrawal symptoms: No    Focus area: Date of last use is 2/27/22  Date Assigned: 12/3/2021  Goal: Maintain abstinence in order to avoid withdrawal concerns.   Goal needs to be completed prior to discharge? [x]  Treatment Strategies:   Advise counselor(s) of any changes in medications throughout the course of treatment. and Remain medication compliant    Target Date: 6/14/22  Date Completed:       Dimension 2: Biomedical Conditions/Complications, Risk Level: 1    Needs identified from Comprehensive Assessment Summary: Patient reports physical health is being addressed by providers. Patient has primary care provider. Patient is able to seek cares as needed  Update: 12/3/21: Patrick is accessing care as needed and addressing pain concerns. Overall reporting stable & managable health   12/9/21: Patrick's primary care provider is leaving the clinic so she is in need of a new provider, she is scheduled with a new provider in the coming weeks. She received a knee injection on 12/2/21 and is hoping to follow up with knee  surgery in the next 90 days.   3/17/22: Patrick is in need of a knee replacement. She has been using a topical pain management cream and has been finding benefit from that. She was also prescribed an antiinflammatory and is taking that, she has been engaging in physical therapy and reporting improvements in her range of motion. Overall she is able to take care of her physical health needs and seeks care as needed. She is hopeful for knee replacement surgery in June of 2022.     Focus area: Significant knee pain   Goal: Manage pain and concerns related to knee.   Goal needs to be completed prior to discharge? []  Methods/Strategies (must include amount and frequency):   1. Roger will report any changes to physical health to counselor.   2. Roger will attend all scheduled doctor's appointments.   3. Roger will take medications as prescribed  4. Meet with new surgeon and identify plan of care  5. Attend physical therapy sessions as scheduled   Target Date: 6/14/22  Completion Date:     Focus area/need: Patient reports current tobacco use.   Goal: Patient to receive information about smoking cessation.   Goal needs to be completed prior to discharge? []  Methods/Strategies (must include amount and frequency):   1. Staff to provide patient with nicotine cessation information and help on how to quit use.   Target Date: 6/14/22  Completion Date:       Dimension 3: Emotional/Behavioral/Cognitive, Risk Level: 1    Needs identified from Comprehensive Assessment Summary: Patient denies mental health concerns though reports recently experiencing mental health symptoms. Patient reports grief related to death of parents. Patient does not have mental health care provider. Patient denies suicidal ideation  Update: 12/3/21: Patrick is working with a therapist to address concerns as they arrise. Overall working on setting boundaries and making time for better self care. Patrick has been working on emotional regulation and  developing mindfulness skills.    12/9/21: Patrick is meeting with her therapist bi-weekly and finding benefit. At this time she continues to work on grief and developing emotional regulation skills.  3/17/22: Patient reports regular engagement in individual therapy and finds benefit in that. Overall she reports stable mental health and wellbeing. She continues to struggle with impulsivity as evidence by sporadic use and is developing coping skills for relapse prevention and impulsive thinking patterns.      Focus area: Grief    Goal: Manage mental health and emotional dystregulation   Goal needs to be completed prior to discharge? []  Methods/Strategies (must include amount and frequency):   1. Meet with therapist as scheduled: bi-weekly  2. Remain medication compliant   3: Assignment(s): Mindfulness  Target Date: 6/14/22  Completion Date:     Dimension 4: Readiness to Change, Risk Level: 1    Needs identified from Comprehensive Assessment Summary: Patient is cooperative and on a taper at [place]   Update: 12/3/21: Patrick has increased motivation for outpatient programing. She is an active and engaged group member. She is on a substance free contract at this time.   12/9/21: Although Patrick is on a substance free contract she was forthcoming about her recent use and is willing to initiate an action plan with staff and follow up with a 1x1 on 12/16/21. Patrick verbalizes a committment to her recovery at this time and is an active group member. She will remain in an extended phase 2 program due to recent use.   3/17/22: Patrick's treatment contract for abstinence has been resent and signed. She verbalizes motivation for recovery and continues to gain personal insight.     Focus area: Continued use while engaged    Goal: Maintain substance free contract     Goal needs to be completed prior to discharge? []  Methods/Strategies (must include amount and frequency):   1. Roger will attend 2, 3-hour groups per week. Days/Times:  Monday and Thursday from 8:30 AM- 11:30 AM   2. Roger will contact staff if unable to attend at [email or web address]  3. Maintain abstinence contract.   Target Date: 6/14/22   Completion Date:     Dimension 5: Relapse/Continued Use/Continued Problem Potential, Risk Level: 3    Needs identified from Comprehensive Assessment Summary: Patient lacks healthy, positive coping skills. Patient lacks skills to prevent relapse. Patient may not fully recognize impact substance use has on physical and/or mental health. Patient lacks significant periods of sobriety in the past. Patient reports no prior treatment episodes  Update: 12/3/21: Patrick has developed insight into her triggers. She has identified coping skills and has began to implement them. Overall she is taking initiative in her recovery and setting up behaviors to lead to continued relapse.    12/9/21: Despite recent use Patrick does have insight to her triggers however is continuing to work on impulse control skills. She does have coping skills however when triggers are strong she   3/17/22: Patrick continues to gain insight into her trigger and specifically identifies pain as one of her major triggers. She is working on coping skill application, she has great recognition of skills however when high pain or stress arise she at times struggles to implement. She continues to work on the development and alteration of her relapse prevention plan.     Focus area: Limited coping skills     Goal: Develop and implement coping skills   Goal needs to be completed prior to discharge? []  Methods/Strategies (must include amount and frequency):   1. Roger will share during each session's check-in any urges and addictive thinking to better understand their pattern of use and to prevent return to use.  2. Complete and present initial relapse prevention plan in group or individual setting.   3. Assignment(s): Initial Relapse Prevention Plan   Target Date:  6/14/22  Completion Date:     Dimension 6: Recovery Environment, Risk Level: 2    Needs identified from Comprehensive Assessment Summary: Patient is unemployed. Patient has stable housing. Patient reports positive support system. Patient reports limited structured daily activities. Patient denies current legal issues. Patient reports past legals.  Update: 12/3/21: Patrick is working on her daily structure and balance. She is identifying if she would like to have sober support meeting as part of her recovery.   12/9/21: Patrick continues to work on the development of structured and balanced daily activities. At this time she is working on slowing down, doing more self care and spending time with family. She is not attending sober support meetings however we have discussed the benefits of engaging.   3/17/22: Patrick has began looking into sober support groups. She has gained insight into the need for peer support and community support. She identified her family as being supportive and uplifting to her. She is working on her daily structure and routine and finding benefit in hobbies. No new legal concerns.     Desire for family involvement: No    Focus area/need: Additional sober support and structure    Goal: Identify recovery support and recovery orientated activities   Goal needs to be completed prior to discharge? []  Methods/Strategies (must include amount and frequency):   1. Identify the pros and cons of sober support group attendance   2. Identify what activities you can engage in that support recovery   3. Attend 1 sober support group   4. Assignment(s): None Assigned  Target Date: 6/14/22  Completion Date:     Resources  Resources to which the patient is being referred for problems when problems are to be addressed concurrently by another provider: No Referrals Needed    [x] Contact insurance to ensure referrals are in network    Vulnerable Adult Review   [X] Review of the facility Abuse Prevention plan was reviewed  with the patient   [X] No individual abuse plan is necessary   [ ] In addition to the facility Abuse Prevention plan, an Individual Abuse Plan will be put in place     Roger attests their date of last use as 2/27/22. Roger attests they have participated in the creation of this treatment plan. Roger has been provided a copy of this treatment plan and is in agreement with how this plan is written and will be stored in the electronic record.       Patient Signature: _________________________________ Date: _________    Counselor Signature: ______________________________ Date: _________

## 2022-03-17 NOTE — GROUP NOTE
Group Therapy Documentation    PATIENT'S NAME: Roger Fallon  MRN:   [id number]  :   1965  ACCT. NUMBER: [id number]  DATE OF SERVICE: 3/17/22  START TIME:  8:30 AM  END TIME: 11:30 AM  FACILITATOR(S): Nevin Monet LADC  TOPIC: BEH Group Therapy  Number of patients attending the group:  3  Group Length:  3 Hours    Group Therapy Type: Addiction and Psychoeducation    Summary of Group / Topics Discussed:    Cognitive Therapy Techniques, Coping Skills/Lifestyle Managemet, Choices in Recovery, Meditation/Breathing Exercises, and Mindfulness    Attestation:   Moo Garber MD - Medical Director - Provides oversight and supervision of care.    Video Visit:      Provider verified identity through the following two step process.  Patient provided:  Patient is known previously to provider and Patient was verified at admission/transfer    Telemedicine Visit: The patient's condition can be safely assessed and treated via synchronous audio and visual telemedicine encounter.      Reason for Telemedicine Visit: Services only offered telehealth    Originating Site (Patient Location): Patient's home    Distant Site (Provider Location): Provider Remote Setting- Home Office    Consent:  The patient/guardian has verbally consented to: the potential risks and benefits of telemedicine (video visit) versus in person care; bill my insurance or make self-payment for services provided; and responsibility for payment of non-covered services.     Patient would like the video invitation sent by:  Send to e-mail at: [email or web address]    Mode of Communication:  Video Conference via Medical Zoom    As the provider I attest to compliance with applicable laws and regulations related to telemedicine.  Group Attendance:  Attended group session    Patient's response to the group topic/interactions:  cooperative with task and discussed personal experience with topic    Patient appeared to be Actively participating, Attentive  and Engaged.        Client specific details:  Patrick shared in group today that she has been gaining confidence in implementing her coping skills and has been learning more about substance use treatment and finding benefit in that. She engaged in meditation and mindfulness practices and expressed how beneficial they have been for her.

## 2022-03-18 ENCOUNTER — DOCUMENTATION ONLY (OUTPATIENT)
Dept: ADDICTION MEDICINE | Facility: CLINIC | Age: 57
End: 2022-03-18
Payer: MEDICARE

## 2022-03-18 NOTE — PROGRESS NOTES
[facility] Weekly Treatment Plan Review    ATTENDANCE for the following date span:  3/14/22-3/20/22    Date     Group Therapy 3 No group  hours No group hours 3.0  No group    Individual Therapy        Family Therapy        Other (Specify) Phase 1 & 2  Phase 3  Phase 1  Phase 1 & 2           Patient had 0 absences during this time period.     Total hours: 171/108 phase 1 & 2 hours. See dimension 4 for extended programing description     Weekly Treatment Plan Review     Treatment Plan initiated on: 21.    Dimension1: Acute Intoxication/Withdrawal Potential -   Previous Dimension Ratin  Current Dimension Ratin  Date of Last Use: 22  Any reports of withdrawal symptoms: Yes, Patient has withdrawal symptoms if she does not take her Methadone at scheduled time  Nrrative: Last use of marijuana 21. Heroin use dates:21, 21, 21, 21, 21-21 and 22. No use endorsed over the last week.     Dimension 2: Biomedical Conditions & Complications -   Previous Dimension Ratin  Current Dimension Ratin  Medical Concerns:  Patient has bad knees, she also is being monitored by PCP for biomedical concerns.   Current Medications & Medication Changes:  Current Outpatient Medications   Medication     escitalopram (LEXAPRO) 20 MG tablet     METFORMIN HCL PO     METHADONE HCL PO     OXcarbazepine (TRILEPTAL) 300 MG tablet     No current facility-administered medications for this visit.     Medication Prescriber:  PCP and MAT   Taking meds as prescribed? Yes  Medication side effects or concerns:  Withdrawal from Methadone if not dosed at appropriate time.   Outside medical appointments this week (list provider and reason for visit): Physical Therapy   Narrative: Patient reports both knees are in poor condition. Patient identified a need for knee replacements. She is working with providers to address her concerns and has met with a  surgeon, shanice surgery date is in 2022. In the internum she is working on strengthening her knees by doing physical therapy exercises so she is ready for surgery when it available. Patrick's CPAP is still on recall with no replacement although she has reached out to her providers to help with this issue. Patrick attended her physical therapy appointment this week and found benefit in that. She reports that the exercises and stretches have been helping her. Patrick reports she was provided with Diclofenac Sodium topical cream for pain relief and has been finding that beneficial. She also has been taking an antiinflammatory and finding that helpful. Patrick is waiting for her walker and hopefully will get it per her report 3/18/2022.No emergent concerns.     Dimension 3: Emotional/Behavioral Conditions & Complications -   Previous Dimension Ratin  Current Dimension Ratin  PHQ9   No flowsheet data found.  GAD7   No flowsheet data found.  Mental health diagnosis None   Date of last SIB:  NA  Date of  last SI:  NA  Date of last HI: NA  Behavioral Targets:  Develop coping skills to manage grief and loss.   Current  Assignments:  Engage in mindfulness   Narrative:  Patrick has experienced significant grief and loss over the last year with her nephew, father and most recently her cousin. Patrick is seeing an individual therapist biweekly and finding benefit in that. She reports her mental health has been mostly stable, since meeting with her therapist regularly and working on coping skills. Patrick endorses continued mood improvement and noticing changes in her overall mental health. She did report that it is the anniversary of her mothers passing and her birthday towards the end of the month. She reports she has been talking with family about this and is planning on being with family on the anniversary.     Dimension 4: Treatment Acceptance / Resistance -   Previous Dimension Ratin  Current Dimension Ratin  OCTAVIO  Diagnosis:  304.30 (F12.20) Cannabis Use Disorder Severe     Opioid Use Disorder, Specify if:  On a maintenance therapy with a severity of:  304.00 (F11.20) Severe  Stage: 2  Commitment to tx process/Stage of change: Action  OCTAVIO assignments:  Initial relapse prevention plan and goodbye letter.   Narrative: Patrick was on an extended phase 1 program due to frequent relapses. She is on a treatment contract at this time. She did complete her relapse prevention plan and at this time is working on updating it as she had a relapse. Patrick signed an updated treatment contract this week as well as her updated treatment plan. She will present her goodbye letter in group on 3/21/22.     Dimension 5: Relapse / Continued Problem Potential -   Previous Dimension Rating:  3  Current Dimension Rating:  3  Relapses this week: None over the last week   Urges to use:  None  UA results: No results found for this or any previous visit (from the past 168 hour(s)).  Narrative: Patrick is working on identifying triggers, she initially identified pain as a primary trigger and was able to manage those triggers until last week. She does have some insight as to what coping skills are working for her and what ones are not. Patrick is being encouraged to reach out for support from family members and counselors when experiencing triggers and urges. Patrick reports her pain has been manageable and that has been helpful in the reduction of triggers. She identified no urges or triggers over the last week.     Dimension 6: Recovery Environment -   Previous Dimension Ratin  Current Dimension Ratin  Family Involvement : None    Summarize attendance at family groups and family sessions: NA  Family supportive of treatment?  Yes  Community support group attendance: In [facility]   Recreational activities: spending time with her grandchildren   Narrative: Patient has support from her family and that her son calls her daily. Patrick recently began attending sober  support meetings and reports benefit in them, she did report she is looking for meetings that have less of a spirituality component and has found that in [employer]. She reports she did attend 1 in the rooms meeting over the last week and found enjoyment in that. She identified that she is in need of a sponsor and has began to look for one. Patient reports no current legal concerns. While Patrick has a good routine and structure she does struggle with finding time to relax and making time for self care. No major changes this past week.     TOPIC  DATES   8 Dimensions of Wellness 9/6/21-9/10/21 & 11/8/21-11/12/21 & 1/10/22-1/14/22 & 1/17/22-1/21/22   Medical Aspects  9/13-9/16/21 & 11/15/21-11/19/21, 1/31-2/3,   Emotional Wellbeing/Mental Health  9/20-9/24/21, 11/22/21, 1/24-1/27/22   Acceptance  9/20/21-9/30/21 & 11/29 2/7/22-2/11/22, 2/14/22-2/18/22   Relationships  10/4/21-10/8/21 & 12/5/21-12/10/21, 2/21/22-2/28/22   Relapse Prevention  10/18/21-10/22/21, 10,25, 10/27, 10/28 & 12/20/21-12/24/21, 12/27/21-12/31/21, 3/7/22-3/11/22, 3/14/22-3/28/22   Sober Structure  10/11/21-10/15/21, 12/13/21-12/17/21, 2/28-3/3/22   Grief and Loss  8/30/21-9/2/21 &11/1/21-11/5/21 & 1/3/22-1/7/22     Justification for Continued Treatment at this Level of Care: Patient is at moderate risk for relapse. Patient has pain and that leads to relapse. She is gaining insight to relapse process as well as developing coping skills.     Discharge Planning:  Target Discharge Date/Timeframe:  4/22/22   Med Mgmt Provider/Appt:  Daily due to MAT    Ind therapy Provider/Appt:  NA    Family therapy Provider/Appt:  NA   Other referrals:  Patient may need referral for pain clinic, PCP     Has vulnerable adult status change? N/A    Service Coordination:  Brad BAXTER counselor, [facility] surgeon.     Supervision:  Patient is staffed with treatment providers, this includes: STEPHANY York and STEPHANY Pierre.     Attestation:    Moo Garber MD - Medical Director - Provides oversight and supervision of care.    Are Treatment Plan goals/objectives effective? Yes  *If no, list changes to treatment plan:    Are the current goals meeting client's needs? Yes  *If no, list the changes to treatment plan.    Client Input / Response: Agree      *Client agrees with any changes to the treatment plan: Yes  *Client received copy of changes: Yes  *Client is aware of right to access a treatment plan review: Yes

## 2022-03-21 ENCOUNTER — VIRTUAL VISIT (OUTPATIENT)
Dept: ADDICTION MEDICINE | Facility: CLINIC | Age: 57
End: 2022-03-21
Attending: FAMILY MEDICINE
Payer: MEDICARE

## 2022-03-21 DIAGNOSIS — F11.20 OPIOID USE DISORDER, SEVERE, DEPENDENCE (H): Primary | ICD-10-CM

## 2022-03-21 PROCEDURE — H2035 A/D TX PROGRAM, PER HOUR: HCPCS | Mod: HQ,PO

## 2022-03-21 NOTE — GROUP NOTE
Group Therapy Documentation    PATIENT'S NAME: Roger Fallon  MRN:   [id number]  :   1965  ACCT. NUMBER: [id number]  DATE OF SERVICE: 3/21/22  START TIME:  8:30 AM  END TIME: 11:30 AM  FACILITATOR(S): Nevin Monet LADC  TOPIC: BEH Group Therapy  Number of patients attending the group:  3  Group Length:  3 Hours    Group Therapy Type: Addiction and Psychoeducation    Summary of Group / Topics Discussed:    Cognitive Therapy Techniques, Grief & Loss, and Mindfulness    Attestation:   Moo Garber MD - Medical Director - Provides oversight and supervision of care.    Video Visit:      Provider verified identity through the following two step process.  Patient provided:  Patient is known previously to provider and Patient was verified at admission/transfer    Telemedicine Visit: The patient's condition can be safely assessed and treated via synchronous audio and visual telemedicine encounter.      Reason for Telemedicine Visit: Services only offered telehealth    Originating Site (Patient Location): Patient's home    Distant Site (Provider Location): Provider Remote Setting- Home Office    Consent:  The patient/guardian has verbally consented to: the potential risks and benefits of telemedicine (video visit) versus in person care; bill my insurance or make self-payment for services provided; and responsibility for payment of non-covered services.     Patient would like the video invitation sent by:  Send to e-mail at: [email or web address]    Mode of Communication:  Video Conference via Medical Zoom    As the provider I attest to compliance with applicable laws and regulations related to telemedicine.    Group Attendance:  Attended group session    Patient's response to the group topic/interactions:  cooperative with task and discussed personal experience with topic    Patient appeared to be Actively participating, Attentive and Engaged.        Client specific details:  Topic this week is grief and  matheus and Patrick expressed she would like to work on a memorial for her mother who passed away last year. She reports her birthday is coming up at the end of the month and thinks it is important to celebrate the day. She was encouraged by her peers.

## 2022-03-23 ENCOUNTER — TELEPHONE (OUTPATIENT)
Dept: ADDICTION MEDICINE | Facility: CLINIC | Age: 57
End: 2022-03-23
Payer: MEDICARE

## 2022-03-24 ENCOUNTER — VIRTUAL VISIT (OUTPATIENT)
Dept: ADDICTION MEDICINE | Facility: CLINIC | Age: 57
End: 2022-03-24
Attending: FAMILY MEDICINE
Payer: MEDICARE

## 2022-03-24 DIAGNOSIS — F11.20 OPIOID USE DISORDER, SEVERE, DEPENDENCE (H): Primary | ICD-10-CM

## 2022-03-24 PROCEDURE — H2035 A/D TX PROGRAM, PER HOUR: HCPCS | Mod: HQ,GT

## 2022-03-24 NOTE — GROUP NOTE
Group Therapy Documentation    PATIENT'S NAME: Roger Fallon  MRN:   [id number]  :   1965  ACCT. NUMBER: [id number]  DATE OF SERVICE: 3/24/22  START TIME:  8:30 AM  END TIME: 11:30 AM  FACILITATOR(S): Nevin Monet LADC  TOPIC: BEH Group Therapy  Number of patients attending the group:  3  Group Length:  3 Hours    Group Therapy Type: Addiction and Psychoeducation    Summary of Group / Topics Discussed:    Cognitive Therapy Techniques, Emotional Regulation, Forgiveness, Grief & Loss, and Meditation/Breathing Exercises    Attestation:   Moo Garber MD - Medical Director - Provides oversight and supervision of care.    Video Visit:      Provider verified identity through the following two step process.  Patient provided:  Patient is known previously to provider and Patient was verified at admission/transfer    Telemedicine Visit: The patient's condition can be safely assessed and treated via synchronous audio and visual telemedicine encounter.      Reason for Telemedicine Visit: Services only offered telehealth    Originating Site (Patient Location): Patient's home    Distant Site (Provider Location): Provider Remote Setting- Home Office    Consent:  The patient/guardian has verbally consented to: the potential risks and benefits of telemedicine (video visit) versus in person care; bill my insurance or make self-payment for services provided; and responsibility for payment of non-covered services.     Patient would like the video invitation sent by:  Send to e-mail at: [email or web address]    Mode of Communication:  Video Conference via Medical Zoom    As the provider I attest to compliance with applicable laws and regulations related to telemedicine.    Group Attendance:  Attended group session    Patient's response to the group topic/interactions:  cooperative with task and discussed personal experience with topic    Patient appeared to be Actively participating, Attentive and Engaged.         Client specific details:  Patrick shared that she has been processing her losses over the last two years and felt that with her use she never properly went through the grieving process. Patrick shared she has been seeing her therapist and finding benefit in that as it has been helpful talking about her losses.

## 2022-03-25 ENCOUNTER — DOCUMENTATION ONLY (OUTPATIENT)
Dept: ADDICTION MEDICINE | Facility: CLINIC | Age: 57
End: 2022-03-25
Payer: MEDICARE

## 2022-03-25 NOTE — PROGRESS NOTES
[facility] Weekly Treatment Plan Review    ATTENDANCE for the following date span:  3/21/22-3/27/22    Date     Group Therapy 3 No group  hours No group hours 3.0  No group    Individual Therapy        Family Therapy        Other (Specify) Phase 1 & 2  Phase 3  Phase 1  Phase 1 & 2           Patient had 0 absences during this time period.     Total hours: 176/108 phase 1 & 2 hours. See dimension 4 for extended programing description     Weekly Treatment Plan Review     Treatment Plan initiated on: 21.    Dimension1: Acute Intoxication/Withdrawal Potential -   Previous Dimension Ratin  Current Dimension Ratin  Date of Last Use: 22  Any reports of withdrawal symptoms: Yes, Patient has withdrawal symptoms if she does not take her Methadone at scheduled time  Nrrative: Last use of marijuana 21. Heroin use dates:21, 21, 21, 21, 21-21 and 22. No use endorsed over the last week. No change.     Dimension 2: Biomedical Conditions & Complications -   Previous Dimension Ratin  Current Dimension Ratin  Medical Concerns:  Patient has bad knees, she also is being monitored by PCP for biomedical concerns.   Current Medications & Medication Changes:  Current Outpatient Medications   Medication     escitalopram (LEXAPRO) 20 MG tablet     METFORMIN HCL PO     METHADONE HCL PO     OXcarbazepine (TRILEPTAL) 300 MG tablet     No current facility-administered medications for this visit.     Medication Prescriber:  PCP and MAT   Taking meds as prescribed? Yes  Medication side effects or concerns:  Withdrawal from Methadone if not dosed at appropriate time.   Outside medical appointments this week (list provider and reason for visit): Physical Therapy   Narrative: Patient reports both knees are in poor condition. Patient identified a need for knee replacements. She is working with providers to address her concerns and has  met with a surgeon, tentative surgery date is in 2022. In the internum she is working on strengthening her knees by doing physical therapy exercises so she is ready for surgery when it available. Patrick's CPAP is still on recall with no replacement although she has reached out to her providers to help with this issue. Patrick attended her physical therapy appointment this week and found benefit in that. She reports that the exercises and stretches have been helping her. Patrick reports she was provided with Diclofenac Sodium topical cream for pain relief and has been finding that beneficial. She also has been taking an antiinflammatory and finding that helpful. Patrick is waiting for her walker that was supposed to come in, she reports she is working with the medical supply company to ensure it comes before her upcoming vacation. No emergent concerns at this time.     Dimension 3: Emotional/Behavioral Conditions & Complications -   Previous Dimension Ratin  Current Dimension Ratin  PHQ9   No flowsheet data found.  GAD7   No flowsheet data found.  Mental health diagnosis None   Date of last SIB:  NA  Date of  last SI:  NA  Date of last HI: NA  Behavioral Targets:  Develop coping skills to manage grief and loss.   Current  Assignments:  Engage in mindfulness   Narrative:  Patrick has experienced significant grief and loss over the last year with her nephew, father and most recently her cousin. Patrick is seeing an individual therapist biweekly and finding benefit in that. During topic this week (grief and loss) she expressed how feeling her feelings rather than numbing has been instrumental in her grief process. She reports her mental health has been mostly stable, since meeting with her therapist regularly and working on coping skills. Overall she reports no emergent concerns.      Dimension 4: Treatment Acceptance / Resistance -   Previous Dimension Ratin  Current Dimension Ratin  OCTAVIO Diagnosis:  304.30  (F12.20) Cannabis Use Disorder Severe     Opioid Use Disorder, Specify if:  On a maintenance therapy with a severity of:  304.00 (F11.20) Severe  Stage: 2  Commitment to tx process/Stage of change: Action  OCTAVIO assignments:  Initial relapse prevention plan and goodbye letter.   Narrative: Patrick was on an extended phase 1 program due to frequent relapses. She is on a treatment contract at this time. Patrick has updated her relapse prevention plan and talked with this writer about this. Patrick shared her goodbye letter on 3/24/22 and received feedback from her peers. Patrick will complete programing on 3/28/22 as our Senior 55+ group is dissolving and she has reached treatment benefit.     Dimension 5: Relapse / Continued Problem Potential -   Previous Dimension Rating:  3  Current Dimension Rating:  3  Relapses this week: None over the last week   Urges to use:  None  UA results: No results found for this or any previous visit (from the past 168 hour(s)).  Narrative: Patrick is working on identifying triggers, she initially identified pain as a primary trigger and was able to manage those triggers until last week. She does have some insight as to what coping skills are working for her and what ones are not. Patrick is being encouraged to reach out for support from family members and counselors when experiencing triggers and urges. Patrick reports her pain has been manageable and that has been helpful in the reduction of triggers. She identified no urges or triggers over the last week. She identified a part of pain management and trigger reduction is associated with overall slowing down and taking her time doing things.     Dimension 6: Recovery Environment -   Previous Dimension Ratin  Current Dimension Ratin  Family Involvement : None    Summarize attendance at family groups and family sessions: NA  Family supportive of treatment?  Yes  Community support group attendance: [facility]   Recreational activities: spending time  with her grandchildren   Narrative: Patient has support from her family and that her son calls her daily. Patrick recently began attending sober support meetings and reports benefit in them, she did report she is looking for meetings that have less of a spirituality component and has found that in [facility] [employer] [facility]. She reports she did attend 1 in the rooms meeting over the last week and found enjoyment in that. Patient reports no current legal concerns. While Patrick has a good routine and structure she does struggle with finding time to relax and making time for self care. She is planning a vacation and will be leaving on 3/30/22.     TOPIC  DATES   8 Dimensions of Wellness 9/6/21-9/10/21 & 11/8/21-11/12/21 & 1/10/22-1/14/22 & 1/17/22-1/21/22   Medical Aspects  9/13-9/16/21 & 11/15/21-11/19/21, 1/31-2/3,   Emotional Wellbeing/Mental Health  9/20-9/24/21, 11/22/21, 1/24-1/27/22   Acceptance  9/20/21-9/30/21 & 11/29 2/7/22-2/11/22, 2/14/22-2/18/22   Relationships  10/4/21-10/8/21 & 12/5/21-12/10/21, 2/21/22-2/28/22   Relapse Prevention  10/18/21-10/22/21, 10,25, 10/27, 10/28 & 12/20/21-12/24/21, 12/27/21-12/31/21, 3/7/22-3/11/22, 3/14/22-3/28/22   Sober Structure  10/11/21-10/15/21, 12/13/21-12/17/21, 2/28-3/3/22   Grief and Loss  8/30/21-9/2/21 &11/1/21-11/5/21 & 1/3/22-1/7/22, 3/21/22-3/25/22     Justification for Continued Treatment at this Level of Care: Patient is at moderate risk for relapse. Patient has pain and that leads to relapse. She is gaining insight to relapse process as well as developing coping skills.     Discharge Planning:  Target Discharge Date/Timeframe:  3/28/22   Med Mgmt Provider/Appt:  Daily due to MAT    Ind therapy Provider/Appt:  NA    Family therapy Provider/Appt:  NA   Other referrals:  Patient may need referral for pain clinic, PCP     Has vulnerable adult status change? N/A    Service Coordination:  Brad BAXTER counselor, [facility] surgeon.     Supervision:  Patient is staffed with  treatment providers, this includes: STEPHANY York and STEPHANY Pierre.     Attestation:   Moo Garber MD - Medical Director - Provides oversight and supervision of care.    Are Treatment Plan goals/objectives effective? Yes  *If no, list changes to treatment plan:    Are the current goals meeting client's needs? Yes  *If no, list the changes to treatment plan.    Client Input / Response: Agree      *Client agrees with any changes to the treatment plan: Yes  *Client received copy of changes: Yes  *Client is aware of right to access a treatment plan review: Yes

## 2022-03-28 ENCOUNTER — VIRTUAL VISIT (OUTPATIENT)
Dept: ADDICTION MEDICINE | Facility: CLINIC | Age: 57
End: 2022-03-28
Attending: FAMILY MEDICINE
Payer: MEDICARE

## 2022-03-28 DIAGNOSIS — F11.20 OPIOID USE DISORDER, SEVERE, DEPENDENCE (H): Primary | ICD-10-CM

## 2022-03-28 PROCEDURE — H2035 A/D TX PROGRAM, PER HOUR: HCPCS | Mod: HQ,PO

## 2022-03-28 NOTE — GROUP NOTE
Group Therapy Documentation    PATIENT'S NAME: Roger Fallon  MRN:   [id number]  :   1965  ACCT. NUMBER: [id number]  DATE OF SERVICE: 3/28/22  START TIME:  8:30 AM  END TIME: 11:30 AM  FACILITATOR(S): Nevin Monet LADC  TOPIC: BEH Group Therapy  Number of patients attending the group:  3  Group Length:  3 Hours    Group Therapy Type: Addiction and Psychoeducation    Summary of Group / Topics Discussed:    Balanced Lifestyle , Cognitive Therapy Techniques, Coping Skills/Lifestyle Managemet, and Meditation/Breathing Exercises    Attestation:   Moo Garber MD - Medical Director - Provides oversight and supervision of care.    Video Visit:      Provider verified identity through the following two step process.  Patient provided:  Patient is known previously to provider and Patient was verified at admission/transfer    Telemedicine Visit: The patient's condition can be safely assessed and treated via synchronous audio and visual telemedicine encounter.      Reason for Telemedicine Visit: Services only offered telehealth    Originating Site (Patient Location): Patient's home    Distant Site (Provider Location): Provider Remote Setting- Home Office    Consent:  The patient/guardian has verbally consented to: the potential risks and benefits of telemedicine (video visit) versus in person care; bill my insurance or make self-payment for services provided; and responsibility for payment of non-covered services.     Patient would like the video invitation sent by:  Send to e-mail at: [email or web address]    Mode of Communication:  Video Conference via Medical Zoom    As the provider I attest to compliance with applicable laws and regulations related to telemedicine.    Group Attendance:  Attended group session, however was only present for 2 hours due to another appointment.     Patient's response to the group topic/interactions:  cooperative with task and discussed personal experience with  topic    Patient appeared to be Actively participating, Attentive and Engaged.        Client specific details:  Patrick expressed her gratitude for outpatient as this is her last day in the group. Patrick shared her relapse prevention and recovery plans.

## 2022-03-29 ENCOUNTER — TELEPHONE (OUTPATIENT)
Dept: BEHAVIORAL HEALTH | Facility: CLINIC | Age: 57
End: 2022-03-29
Payer: MEDICARE

## 2022-03-29 ENCOUNTER — TELEPHONE (OUTPATIENT)
Dept: ADDICTION MEDICINE | Facility: CLINIC | Age: 57
End: 2022-03-29
Payer: MEDICARE

## 2022-03-29 ENCOUNTER — VIRTUAL VISIT (OUTPATIENT)
Dept: BEHAVIORAL HEALTH | Facility: CLINIC | Age: 57
End: 2022-03-29
Attending: FAMILY MEDICINE
Payer: MEDICARE

## 2022-03-29 DIAGNOSIS — F11.20 OPIOID USE DISORDER, SEVERE, DEPENDENCE (H): ICD-10-CM

## 2022-03-29 DIAGNOSIS — F17.200 TOBACCO USE DISORDER: Primary | ICD-10-CM

## 2022-03-29 PROCEDURE — 99443 PR PHYSICIAN TELEPHONE EVALUATION 21-30 MIN: CPT | Performed by: FAMILY MEDICINE

## 2022-03-29 RX ORDER — CELECOXIB 100 MG/1
CAPSULE ORAL
COMMUNITY
Start: 2022-03-03 | End: 2023-09-19

## 2022-03-29 RX ORDER — ALBUTEROL SULFATE 90 UG/1
AEROSOL, METERED RESPIRATORY (INHALATION)
COMMUNITY
Start: 2022-03-09

## 2022-03-29 ASSESSMENT — ANXIETY QUESTIONNAIRES
GAD7 TOTAL SCORE: 12
IF YOU CHECKED OFF ANY PROBLEMS ON THIS QUESTIONNAIRE, HOW DIFFICULT HAVE THESE PROBLEMS MADE IT FOR YOU TO DO YOUR WORK, TAKE CARE OF THINGS AT HOME, OR GET ALONG WITH OTHER PEOPLE: VERY DIFFICULT
5. BEING SO RESTLESS THAT IT IS HARD TO SIT STILL: NEARLY EVERY DAY
6. BECOMING EASILY ANNOYED OR IRRITABLE: SEVERAL DAYS
2. NOT BEING ABLE TO STOP OR CONTROL WORRYING: NOT AT ALL
1. FEELING NERVOUS, ANXIOUS, OR ON EDGE: SEVERAL DAYS
3. WORRYING TOO MUCH ABOUT DIFFERENT THINGS: SEVERAL DAYS
4. TROUBLE RELAXING: NEARLY EVERY DAY
7. FEELING AFRAID AS IF SOMETHING AWFUL MIGHT HAPPEN: NEARLY EVERY DAY

## 2022-03-29 ASSESSMENT — PATIENT HEALTH QUESTIONNAIRE - PHQ9: SUM OF ALL RESPONSES TO PHQ QUESTIONS 1-9: 16

## 2022-03-29 NOTE — TELEPHONE ENCOUNTER
Please reach out to patient and help her schedule a follow-up with me in 2-3 weeks, can be in person or virtual.  I spoke with Nevin ([facility]) and she will be providing her with some additional treatment program options.  I would like to follow-up with the patient to provide her some additional support and continuity, even if only for a short period of time.  Thanks!    Chetna Arteaga, DO on 3/29/2022 at 4:16 PM

## 2022-03-29 NOTE — PROGRESS NOTES
This video/telephone visit will be conducted via a call between you and your physician/provider. We have found that certain health care needs can be provided without the need for an in-person physical exam. This service lets us provide the care you need with a video /telephone conversation. If a prescription is necessary we can send it directly to your pharmacy. If lab work is needed we can place an order for that and you can then stop by our lab to have the test done at a later time.    Just as we bill insurance for in-person visits, we also bill insurance for video/telephone visits. If you have questions about your insurance coverage, we recommend that you speak with your insurance company.    Patient has given verbal consent for video/Telephone visit? Yes    Patient would like video visit, please connect :  Email link to-  [email or web address]   If unable to connect call [phone]  Reason for visit: Intake, discuss relapse x1 week ago  Patient verified allergies, medications and pharmacy via telephone.     ELVIRA-7 scores:    ELVIRA-7 SCORE 3/29/2022   Total Score 12       PHQ-9 scores:   PHQ-9 SCORE 3/29/2022   PHQ-9 Total Score 16     [place]  to be reviewed by provider.   Patient states she  is ready for visit.    Rizwana Oneill CMA March 29, 2022 9:36 AM

## 2022-03-29 NOTE — PROGRESS NOTES
"[facility] ADDICTION MEDICINE  INITIAL VISIT                                                      SUBJECTIVE                                                    CC: Patient presents with:  Intake: Discuss relapse and starting medications      Primary care provider: [facility]  Psychiatric provider or therapist: [facility]    Visit performed Virtual, via telephone    Telephone call duration:  1015 am - 1055 am    HPI:    Roger Fallon is a 56 year old female with a history of chronic pain (lumbar herniated disc/stenosis, OA of knees), CPAP, pre-diabetes, complex partial epilepsy (last seizure was 6 years ago per patient), migraines, pituitary adenoma s/p resection, cannabis use disorder, and OUD who presents for initial visit for addiction consultation and management referred by STEPHANY Pierre due to concerns for Opioid Use Disorder (OUD).      CD History:     Patrick began using prescription opioids in her 30s when she was experiencing significant back and knee pain.  This progressed to intranasal heroin use.  She continues to struggle with bilateral knee pain and has history of 2 previous right knee surgeries and reports she is needing to get a left knee replacement.  She is currently in PT and following with orthopedics.  Her ongoing heroin use is a barrier to getting her knee replacement.  Finds this depression. \"I'm just tired.\"    Attends [facility] for methadone and was using heroin daily prior to beginning methadone program in approximately 2019 and use decreased to every 3-4 days.  Her ongoing use is leading to a methadone taper.  She wants to continue methadone and is not interested in Suboxone (tried in the past but made her ill, lots of vomiting).  She was previously on dose of 110mg per day and felt this dose was adequate.  Was tapering until she entered into treatment at [facility] and then dose was increased again but only to 105mg and she does not feel this is " sufficient (ongoing cravings and pain).  Feels really irritated that it cannot be increased.    Reports her last use was 1 week ago.  Identifies pain as a trigger for cravings and use.  She saw her doctor at [facility] about 6 months ago and is planning to talk to him again, meeting with her counselor there tomorrow.      She is not willing to do residential treatment.      After our phone conversation I was able to connect with STEPHANY Pierre.  Due to programming changes, Patrick will no longer be attending outpatient treatment with [facility] and thus her methadone will be tapered.      SUBSTANCE(S)  OF CHOICE:        Withdrawal symptoms:  Vomiting, insomnia, aches, irritable  IV drug use:  none  Previous MAT:  Currently on methadone, tried Suboxone twice (vomiting)  Previous detox/treatment:  She just completed IOP program but will be looking for other outpatient treatment options   Current recovery activities: online meetings  Longest period of sobriety:  Little more than 1 month   Significant protective factors:  Methadone at adequate dose (she identifies this dose as 110mg)  Medical complications from substance use:  Denies  History of overdose:  Denies  Narcan currently available:  Yes (gets from [place])     Other Substance Use:  ALCOHOL:  Deneis  OPIATES:  As above  KRATOM:  Denies  BENZODIAZEPINES:  Denies (prescribed >15 years ago briefly)  AMPHETAMINES/METHAMPHETAMINES:  Denies  PRESCRIPTION STIMULANTS:  Denies   MARIJUANA:  Most days smokes 1 joint to help her sleep  COCAINE/CRACK:  Denies  HALLUCINOGENS:  Denies  ANABOLIC STEROIDS:  Denies   OTHER  (Gambling, shopping):  Denies  NICOTINE:  1 pack every 3 days previously and now down to 5-6 cigarettes per day    Desire to quit:  Yes          Hx of medication for smoking cessation:  Patches (fall off!), would like to try gum    Infectious Disease Screening:  Hep C: not in medical record  HIV:  Not in medical record    Pregnancy Status:    LMP:  post-menopausal (age 38)    PAST PSYCHIATRIC HISTORY  Past diagnoses:  Denies  Current or past psychiatrist: Denies  Current or past therapist:  Has one currently - [facility]  Hospitalizations/commitment:  Denies  Suicide Attempts:  Very remote (>30 years)  Psychotherapy/ECT/TMS:  Therapy  Medication trials:  Xanax (>15 years ago), Zoloft (?)    PHQ-9 scores:  PHQ-9 SCORE 3/29/2022   PHQ-9 Total Score 16     ELVIRA-7 scores:  ELVIRA-7 SCORE 3/29/2022   Total Score 12       MEDICAL HISTORY:  Problem list, allergies, and histories reviewed & adjusted, as indicated.  Additional history: as documented     Patient Active Problem List    Diagnosis Date Noted     Opioid use disorder, severe, dependence (H) 08/20/2021     Priority: Medium     Cannabis use disorder, mild, abuse 08/20/2021     Priority: Medium     History of heroin abuse (H) 08/13/2019     Priority: Medium     Foraminal stenosis of lumbar region 08/13/2019     Priority: Medium     Osteoarthritis of both hands 08/13/2019     Priority: Medium     PTSD (post-traumatic stress disorder) 08/13/2019     Priority: Medium     Pre-diabetes 08/13/2019     Priority: Medium     2018: A1c = 6.1%         Osteoarthritis of both knees 07/07/2019     Priority: Medium     Methadone use 07/07/2019     Priority: Medium     Substance abuse (H) 06/21/2019     Priority: Medium     Degeneration of intervertebral disc of lumbar region 08/05/2011     Priority: Medium     Complex partial epilepsy (H) 04/26/2011     Priority: Medium     Major depressive disorder, recurrent episode, mild (H) 05/10/2010     Priority: Medium     Reactive airway disease 12/30/2009     Priority: Medium     Migraine 07/17/2009     Priority: Medium     Hypercholesterolemia 05/07/2009     Priority: Medium     Insomnia 06/09/2008     Priority: Medium     Chronic pain 05/29/2008     Priority: Medium     Pituitary adenoma (H) 05/29/2008     Priority: Medium       Past Medical History:   Diagnosis Date     Arthritis       Back pain      Chronic low back pain      Diabetes mellitus (H)      Pituitary adenoma (H)      Seizures (H)      Sleep apnea        Past Surgical History:   Procedure Laterality Date     ARTHROSCOPY KNEE Right       SECTION  , ,      KNEE SURGERY Right     x 2       Family History   Problem Relation Age of Onset     Breast Cancer Mother      Diabetes Mother      Hypertension Father      Diabetes Father        SOCIAL HISTORY:    Living situation:  W/ daughter, son, and granddaughter (age 5)   Single///partner single   Children 3    Support system: Kids, friend (talk daily), sisters   Employment: Not currently, she is on disabiliy   Barriers to Care (transportation, childcare, etc): None   Upcoming Court Date(s): None   Consent to Communicate? NA       ALLERGIES & CURRENT MEDICATIONS:  Allergies   Allergen Reactions     Imitrex [Sumatriptan] Anaphylaxis     Penicillins Anaphylaxis     Sumatriptan Anaphylaxis and Hives     Cucumber Extract Swelling     Tongue     Ondansetron Hives     Pumpkin Seed Swelling     Tongue swells       Current Outpatient Medications   Medication Sig Dispense Refill     albuterol (PROAIR HFA/PROVENTIL HFA/VENTOLIN HFA) 108 (90 Base) MCG/ACT inhaler INHALE 1 PUFF BY MOUTH EVERY 4 HOURS FOR 14 DAYS AS NEEDED       celecoxib (CELEBREX) 100 MG capsule TAKE 1 CAPSULE BY MOUTH TWICE DAILY AS NEEDED       METHADONE HCL PO Take 100 mg by mouth daily       OXcarbazepine (TRILEPTAL) 300 MG tablet TAKE 2 TABLET BY MOUTH EVERY MORNING AND 3 TABLET BY MOUTH IN THE EVENING 450 tablet 11     escitalopram (LEXAPRO) 20 MG tablet Take 20 mg by mouth daily. (Patient not taking: Reported on 3/29/2022)       METFORMIN HCL PO Take 500 mg by mouth 2 times daily (with meals) (Patient not taking: Reported on 3/29/2022)         REVIEW OF SYSTEMS:  General:  No recent infections or fever    Resp: No coughing, wheezing or shortness of breath  CV: No chest pains or  palpitations  GI: No nausea, vomiting, abdominal pain, diarrhea.  Some constipation (managed with Miralax)  : No urinary frequency or dysuria    Musculoskeletal: No significant muscle or joint pains other than as above and also carpal tunnel pain on right.  No edema.  Neurologic: No numbness, tingling, weakness, problems with balance or coordination  Psychiatric: No acute concerns other than as above. See mental status exam for more information.   Skin: No rashes or areas of acute infection.  Rash comes and goes since getting COVID vaccine.      OBJECTIVE                                                      LABS:  Labs reviewed.  Pertinent data include:   Urine tox results not completed today.     No results found for any visits on 03/29/22.  No results found for: AST, ALT    PHYSICAL EXAM:  There were no vitals taken for this visit.    Speech is clear and coherent.  She is speaking in full sentences, no coughing or wheezing.  Mood is depressed, affect is mood congruent.  Thought content is normal.  No tangential thoughts.  Insight/judgment fair.      [facility] Board of Pharmacy Data Base Reviewed.  Consistent with patient reports and Epic records.    ASSESSMENT/PLAN                                                        ASSESSMENT/PLAN:    1. Opioid use disorder, severe, dependence (H)  Reviewed with patient that I cannot continue her methadone but if she would like to work with her methadone clinic to transition to Suboxone I would be able to take that prescription over.  She is not interested in Suboxone at this time.  After speaking with [facility], I gained more understanding for purpose of referral and have asked staff to reach out to ensure follow-up with me.  I will support her as she transitions out of outpatient treatment and encourage ongoing treatment.    - Adult Mental Health  Referral    2. Tobacco use disorder  She would like to try nicotine gum, script sent to pharmacy.    - nicotine  (NICORETTE) 2 MG gum; Place 1 each (2 mg) inside cheek every hour as needed for smoking cessation  Dispense: 160 each; Refill: 3       Patient counseling completed today:    Discussed options for treatment of opioid use disorder with medications including Naltrexone/Vivitrol, Suboxone/sublocade, and Methadone.  The risks, benefits, side effects, appointment logistics, and future pain management considerations with all forms of these medications were reviewed at length.  Risk of overdose following a period of abstinence due to decrease tolerance was discussed including risk of death. Risk of overdose if using Suboxone with other substances particuarly alcohol, benzodiazepines, and gabapentin was reviewed.      Counseled the patient on the importance of having a recovery program in addition to medication assisted treatment (MAT).  Components of a recovery program include having some type of sober network, avoiding isolating, willingness to change, avoiding triggers, and managing cravings.    Recommended to abstain from alcohol, benzodiazepines, THC, opioids, and other drugs of abuse.  Increased risk of relapse for opioids with use of these substances was discussed.     Opioid warning reviewed.  Risk of overdose following a period of abstinence due to decrease tolerance was discussed including risk of death.  Strongly recommended abstain from alcohol, benzodiazepines, THC, opioids and other drugs of abuse.  Increased risk of return to opioid use after use of these substances discussed.  Increased risk of overdose/death with use of other substances particularly benzodiazepines/alcohol reviewed.    RTC:  2-3 weeks      Chetna Arteaga [facility] Addiction Medicine  [facility]  [phone]

## 2022-03-30 ENCOUNTER — BEH TREATMENT PLAN (OUTPATIENT)
Dept: ADDICTION MEDICINE | Facility: CLINIC | Age: 57
End: 2022-03-30
Payer: MEDICARE

## 2022-03-30 ENCOUNTER — DOCUMENTATION ONLY (OUTPATIENT)
Dept: ADDICTION MEDICINE | Facility: CLINIC | Age: 57
End: 2022-03-30
Payer: MEDICARE

## 2022-03-30 ASSESSMENT — ANXIETY QUESTIONNAIRES: GAD7 TOTAL SCORE: 12

## 2022-03-31 NOTE — PROGRESS NOTES
Substance Use Disorder Discharge Summary       Name:  Roger Fallon   :  Nevin Monet [facility]   Admit Date: 21   Discharge Date: 3/28/22   :  1965   Hours Completed: 176   Initial Diagnosis:  304.00 (F11.20) Opioid Use Disorder Severe   Final Diagnosis:  304.00 (F11.20) Opioid Use Disorder Severe   Discharge Address:    [address] [place] [address] Funding Source:    Medicare/[facility] PMAP     Program:  Senior Recovery Program     Discharge Type:  Premature due to closure of program. Recommendation for assessment due to recurrent substance use    Patient was receiving residential services at the time of discharge:   NO    Reasons for and circumstances of service termination:  Roger completed phase 1 & 2 of the senior recovery program. She is being discharged without completing phase 3 due to the closing of the program however would benefit from additional professional lead support as she has frequent relapses.      If program discharge status was At Staff Request, the license trujillo must identify the following:    Other interested parties conferred with: not applicable    Referrals provided: not applicable    Alternatives considered and attempted before deciding to discharge:  not applicable    Dimension/Course of Treatment/Individualized Care:   1.  Withdrawal Potential - Intake Risk level -  1 Discharge Risk level -1  Narrative supporting risk description:  Last use of marijuana 21. Heroin use dates:21, 21, 21, 21, 21-21 and 22. Roger did not endorse any withdrawal concerns while enrolled. She is on Methadone at this time.   Treatment plan goals and progress towards those goals:  Roger's goal was to maintain abstinence to avoid withdrawal as well as remain medication compliant. Roger has had multiple relapses yet has not endorsed any withdrawal concerns. She has been compliant with her methodone dosing.    2.  Biomedical  Conditions and Complications - Intake Risk level - 1 Discharge Risk level - 1  Narrative supporting risk description:  Roger is in need of a knee replacement, she is working with the team at [facility] on these concerns and doing physical therapy through them. She does endorse her knee pain as a primary trigger for relapse. Roger has managed her dental concerns while enrolled and continues to care for them as directed by her dental provider. She is waiting a walker and CPAP machine and is working on this with the medical supply [employer].    Treatment plan goals and progress towards those goals:  The patient's goal was to manage her biomedical concerns. She has been working with her providers on her knee concerns. She is also engaging in regular physical therapy. Although there are no new concerns she does endorse her physical pain as concerning and attributes it to continued relapse.     3.  Emotional/Behavioral/Cognitive Conditions and Complications - Intake Risk level -  2 Discharge Risk level - 2  Narrative supporting risk description:  Roger is receiving individual therapy bi-weekly. She has experienced significant loss over the last year(s). She continues to struggle with impulsivity as evidence by her continued use. She has identified stress and family concerns as a trigger for relapse while enrolled. Roger has not endorsed any SI/SIB/HI while enrolled.  Treatment plan goals and progress towards those goals:  Roger's goal was to manage her mental health, she did this by engaging in individual therapy. While enrolled she engaged in mindfulness and meditations.    4.  Readiness for Change - Intake Risk level -  2 Discharge Risk level - 2  Narrative supporting risk description:  Roger was on a treatment contract and an extended phase 1 and 2 due to her relapses. She verbalized a desire for sobriety while at times her actions have not been congruent with these verbalizations. At this  time she has met the maximum treatment benefit.   Treatment plan goals and progress towards those goals:  Roger's goal was to increase her motivation for recovery and engage in outpatient. She verbalized desire for ongoing recovery however lacked consistent behaviors as evidence by her continued use.   5.  Relapse/Continued Use/Continued Problem Potential - Intake Risk level -  3 Discharge Risk level - 3  Narrative supporting risk description:  Roger gained insight into what her triggers are. She also developed coping skills however lacked consistent application of her skills. Roger relapsed on multiple occassions while enrolled. She has some understanding of the correlation between mental health and substance use however would benefit from further knowledge of her personal impulsive patterns. At this time she is at high risk for relapse.   Treatment plan goals and progress towards those goals:  Roger's goal was to gain insight into her personal triggers and develop coping skills/relapse prevention plan. She did gain insight into her personal triggers however had difficulty implementing her developed coping skills. She was encouraged to review and update her relapse prevention plan.    6.  Recovery Environment - Intake Risk level -  2 Discharge Risk level - 2  Narrative supporting risk description:  Roger is unemployeed. She has some daily structure however lacks activities that promote recovery. She has engaged in very few sober support groups however has been given ample recovery resources. She has no legal concerns.   Treatment plan goals and progress towards those goals:  Roger's goal was to develop structure and sober support. She was partially successful in this, as she has began to develop some structured activity however lacked in sober support.    Strengths: Kind, strong willed  Needs: Individual therapy, sober structured activity, engagement in sober support networks, additional  professional led support groups.    Services Provided: Intake, assessment, treatment planning, education, group discussion, film, lectures, 1x1 therapy, and recommendations at discharge.      Program Involvement: good  Attendance: fair  Ability to relate in group/   Other program activities: fair  Assignment Completion: fair  Overall Behavior: fair  Reported Family/Significant   Other Involvement: NA    Prognosis: Guarded    Focus of Treatment / Discharge Recommendations    Personal Safety/ Management of Symptoms    * Follow your safety plan.  Report increased symptoms to your care team and /or go to the nearest Emergency Department.    * Call crisis lines as needed    [facility] [phone]                [facility] [phone]  [facility] [phone]              Crisis Connection [phone]  [facility] [phone]              [facility] [phone]  [facility] [phone]          National Suicide Prevention [phone]  [facility] [phone]            Suicide Prevention [phone]  [facility] [phone]    Abstinence/Relapse Prevention  * Take all medicines as directed.  Carry a current list of medicines with you.  * Use coping skills: Meditation, mindfulness, pain management, deep breathing  * Do not use illicit (street) drugs, controlled substances (narcotics) or alcohol.    Develop/Improve Independent Living/Socialization Skills: Structured routine     Community Resources/Supports and Discharge Planning:    Follow up with psychiatrist / main caregiver: NA        Follow up with your therapist: Community therapist     Go to group therapy and / or support groups at: Community support groups. Additional professionally lead support due to frequent relapses    See your medical doctor about:  Any new or emergent biomedical concerns     Other:  Further assessment if substance use persists and additional treatment is needed.     Counselor Name and Title:  Nevin  GOLD Monet [facility]       Date:  3/31/2022  Time:  9:10 AM

## 2022-04-05 ENCOUNTER — TELEPHONE (OUTPATIENT)
Dept: ADDICTION MEDICINE | Facility: CLINIC | Age: 57
End: 2022-04-05
Payer: MEDICARE

## 2022-04-18 NOTE — TELEPHONE ENCOUNTER
Patient is scheduled to see me for follow-up on 4/26.    Chetna Arteaga DO on 4/18/2022 at 3:30 PM

## 2022-04-26 ENCOUNTER — VIRTUAL VISIT (OUTPATIENT)
Dept: BEHAVIORAL HEALTH | Facility: CLINIC | Age: 57
End: 2022-04-26
Attending: PSYCHIATRY & NEUROLOGY
Payer: MEDICARE

## 2022-04-26 DIAGNOSIS — F11.20 OPIOID USE DISORDER, SEVERE, DEPENDENCE (H): Primary | ICD-10-CM

## 2022-04-26 PROCEDURE — 99443 PR PHYSICIAN TELEPHONE EVALUATION 21-30 MIN: CPT | Mod: 95 | Performed by: FAMILY MEDICINE

## 2022-04-26 NOTE — PROGRESS NOTES
This video/telephone visit will be conducted via a call between you and your physician/provider. We have found that certain health care needs can be provided without the need for an in-person physical exam. This service lets us provide the care you need with a video /telephone conversation. If a prescription is necessary we can send it directly to your pharmacy. If lab work is needed we can place an order for that and you can then stop by our lab to have the test done at a later time.    Just as we bill insurance for in-person visits, we also bill insurance for video/telephone visits. If you have questions about your insurance coverage, we recommend that you speak with your insurance company.    Patient has given verbal consent for video/Telephone visit? Yes    Patient would like telephone visit, please connect : Call [phone]  Reason for visit: Discuss OCTAVIO eval  Patient verified allergies, medications and pharmacy via telephone.     ELVIRA-7 scores:    ELVIRA-7 SCORE 3/29/2022   Total Score 12     PHQ-9 scores:   PHQ-9 SCORE 3/29/2022   PHQ-9 Total Score 16     MNPMP to be reviewed by provider.   Medication refill is pended for review and approval by provider.  Patient states she  is ready for visit.    Rizwana Oneill, TOMMIE April 26, 2022 8:49 AM

## 2022-04-26 NOTE — PROGRESS NOTES
[facility]    A/P                                                    ASSESSMENT/PLAN    1. Opioid use disorder, severe, dependence (H)  She is struggling with ongoing methadone taper.  We discussed option of trying Suboxone again in the future as well as risks and benefits, she will consider.  Encouraged her to discuss this with [facility] as well as ideally the transition would happen there and then I am happy to take Suboxone script over.  Discussed looking for alternative outpatient treatment program and she is open to this so I have placed a new OCTAVIO referral.    - Adult Mental Health  Referral; Future      PDMP Review       Value Time User    State PDMP site checked  Yes 3/29/2022 10:13 AM Chetna Arteaga, DO            RTC  Return in about 2 weeks (around 5/10/2022) for Follow up, with me, using a phone visit - 9am on 5/10 (patient aware).      Counseled the patient on the importance of having a recovery program in addition to medication to manage recovery.  Components include avoiding isolating, having willingness to change, avoiding triggers and managing cravings. Encouraged having some type of sober network and practicing honesty with trusted support person(s). Encouraged other services such as counseling, 12 step or other self-help organizations.      Opioid warning reviewed.  Risk of overdose following a period of abstinence due to decrease tolerance was discussed including risk of death.  Strongly recommended abstain from alcohol, benzodiazepines, THC, opioids and other drugs of abuse.  Increased risk of return to opioid use after use of these substances discussed.  Increased risk of overdose/death with use of other substances particularly benzodiazepines/alcohol reviewed.      SUBJECTIVE                                                    Malvinjazmine Fallon is a 56 year old female who presents to clinic today for follow up    Visit performed Virtual, via  telephone    Phone call duration:  26 minutes (9:06 -9:32 AM)    Brief history of substance use:  Patrick began using opioids in her 30s when she was prescribed opioids for back and knee pain.  Progressed to intranasal heroin use.  Has been on methadone with Daniel since 2019 and heroin use has decreased, though she has not completely abstained.  She was being tapered off methadone due to ongoing use but then began outpatient treatment so she was ultimately continued on methadone.  Her outpatient program has been discontinued so her taper has resumed.  She has not been interested in Suboxone.  Continues to struggle with ongoing cravings and heroin use, citing pain as her trigger.  Goal for sobriety is also related to being able to proceed with TKA.      Plan from most recent office visit:    1. Opioid use disorder, severe, dependence (H)  Reviewed with patient that I cannot continue her methadone but if she would like to work with her methadone clinic to transition to Suboxone I would be able to take that prescription over.  She is not interested in Suboxone at this time.  After [facility] with [facility], I gained more understanding for purpose of referral and have asked staff to reach out to ensure follow-up with me.  I will support her as she transitions out of outpatient treatment and encourage ongoing treatment.    - Adult Mental Health  Referral     2. Tobacco use disorder  She would like to try nicotine gum, script sent to pharmacy.    - nicotine (NICORETTE) 2 MG gum; Place 1 each (2 mg) inside cheek every hour as needed for smoking cessation  Dispense: 160 each; Refill: 3       TODAY'S VISIT  HPI Apr 26, 2022  - Currently tapering methadone 95mg/day, pain is trigger for use (felt best at 110mg/day)  - No sleeping well due to taper  - Waiting on CPAP machine  - In past when she had been tapered she then started groups and found it helpful  - Doing PT which is helpful for her knees (has tried multiple  injections, awaiting surgery)  - Suboxone made her really sick in past so she remains hesitant to consider this - vomiting up blood    No other health concerns today.      OBJECTIVE                                                    PHYSICAL EXAM:  There were no vitals taken for this visit.    Speech is clear and coherent.  She is not coughing or wheezing, she is speaking in full sentences.  She is future oriented.  Mood is anxious, affect is appropriate.        LAB  No results found for any visits on 04/26/22.      HISTORY                                                    Problem list reviewed & adjusted, as indicated.  Patient Active Problem List   Diagnosis     Chronic pain     Complex partial epilepsy (H)     Degeneration of intervertebral disc of lumbar region     Hypercholesterolemia     Insomnia     Major depressive disorder, recurrent episode, mild (H)     Migraine     Pituitary adenoma (H)     Reactive airway disease     Substance abuse (H)     Osteoarthritis of both knees     Methadone use     History of heroin abuse (H)     Foraminal stenosis of lumbar region     Osteoarthritis of both hands     PTSD (post-traumatic stress disorder)     Pre-diabetes     Opioid use disorder, severe, dependence (H)     Cannabis use disorder, mild, abuse         MEDICATION LIST (prior to visit)  albuterol (PROAIR HFA/PROVENTIL HFA/VENTOLIN HFA) 108 (90 Base) MCG/ACT inhaler, INHALE 1 PUFF BY MOUTH EVERY 4 HOURS FOR 14 DAYS AS NEEDED  celecoxib (CELEBREX) 100 MG capsule, TAKE 1 CAPSULE BY MOUTH TWICE DAILY AS NEEDED  METFORMIN HCL PO, Take 500 mg by mouth 2 times daily (with meals)  METHADONE HCL PO, Take 95 mg by mouth daily  nicotine (NICORETTE) 2 MG gum, Place 1 each (2 mg) inside cheek every hour as needed for smoking cessation  OXcarbazepine (TRILEPTAL) 300 MG tablet, TAKE 2 TABLET BY MOUTH EVERY MORNING AND 3 TABLET BY MOUTH IN THE EVENING    No current facility-administered medications on file prior to  visit.      MEDICATION LIST (after visit)  Current Outpatient Medications   Medication     albuterol (PROAIR HFA/PROVENTIL HFA/VENTOLIN HFA) 108 (90 Base) MCG/ACT inhaler     celecoxib (CELEBREX) 100 MG capsule     METFORMIN HCL PO     METHADONE HCL PO     nicotine (NICORETTE) 2 MG gum     OXcarbazepine (TRILEPTAL) 300 MG tablet     No current facility-administered medications for this visit.         Allergies   Allergen Reactions     Imitrex [Sumatriptan] Anaphylaxis     Penicillins Anaphylaxis     Sumatriptan Anaphylaxis and Hives     Cucumber Extract Swelling     Tongue     Ondansetron Hives     Pumpkin Seed Swelling     Tongue swells       Chetna Arteaga, [facility]  [facility]  [phone]

## 2022-04-28 NOTE — PROGRESS NOTES
Medications Phoned  to Pharmacy [] yes [x]no     Medications ordered this visit were e-scribed.  Verified by order class [] yes  [x] no  List medications sent to pharmacy:     Medication changes or discontinuations were communicated to patient's pharmacy: [] yes  [x] no     Dictation completed at time of chart check: [] yes  [x] no     I have checked the documentation for today s encounters and the above information has been reviewed and completed.        Rizwana Oneill, TOMMIE April 28, 2022 9:40 AM

## 2022-05-03 ENCOUNTER — TELEPHONE (OUTPATIENT)
Dept: BEHAVIORAL HEALTH | Facility: CLINIC | Age: 57
End: 2022-05-03
Payer: MEDICARE

## 2022-05-03 ENCOUNTER — HOSPITAL ENCOUNTER (OUTPATIENT)
Dept: BEHAVIORAL HEALTH | Facility: CLINIC | Age: 57
Discharge: HOME | End: 2022-05-03
Attending: FAMILY MEDICINE
Payer: MEDICARE

## 2022-05-03 VITALS — HEIGHT: 60 IN | BODY MASS INDEX: 41.23 KG/M2 | WEIGHT: 210 LBS

## 2022-05-03 DIAGNOSIS — F11.20 OPIOID USE DISORDER, SEVERE, DEPENDENCE (H): ICD-10-CM

## 2022-05-03 PROCEDURE — 999N000216 HC STATISTIC ADULT CD FACE TO FACE-NO CHRG: Mod: TEL

## 2022-05-03 ASSESSMENT — COLUMBIA-SUICIDE SEVERITY RATING SCALE - C-SSRS
1. IN THE PAST MONTH, HAVE YOU WISHED YOU WERE DEAD OR WISHED YOU COULD GO TO SLEEP AND NOT WAKE UP?: NO
2. HAVE YOU ACTUALLY HAD ANY THOUGHTS OF KILLING YOURSELF IN THE PAST MONTH?: NO
6. HAVE YOU EVER DONE ANYTHING, STARTED TO DO ANYTHING, OR PREPARED TO DO ANYTHING TO END YOUR LIFE?: YES
5. HAVE YOU STARTED TO WORK OUT OR WORKED OUT THE DETAILS OF HOW TO KILL YOURSELF? DO YOU INTEND TO CARRY OUT THIS PLAN?: NO
4. HAVE YOU HAD THESE THOUGHTS AND HAD SOME INTENTION OF ACTING ON THEM?: NO
3. HAVE YOU BEEN THINKING ABOUT HOW YOU MIGHT KILL YOURSELF?: NO

## 2022-05-03 ASSESSMENT — PAIN SCALES - GENERAL: PAINLEVEL: MODERATE PAIN (5)

## 2022-05-03 NOTE — TELEPHONE ENCOUNTER
The below telephone note was routed to the [facility] UR department at: [facility] [[id number]] and to the [facility] IOP Admissions Department at: [facility] ADULT IOP INTAKE POOL [[id number]] on 5/3/2022 as a referral for IOP treatment at [facility].    A.)  Name: Roger Fallon Tel #: [phone]  B.)  MRN: [id number]  C.)  Referral is for: a virtual or hybrid Intensive Outpatient program at one of [facility] locations for substance use disorder treatment.  D.)  Notes: Patient was at [facility], would like to enroll in day program again.    Thanks,    STEPHANY Griffin

## 2022-05-03 NOTE — PROGRESS NOTES
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 him, and she had to decline because she was fearful of withdrawal symptoms.  Patient said that once the taper began, she also began using again, \"but not as much.\"  Patient said she was concerned with her recent pattern of use and said she used for the past 3 days straight.  Patient is not interested in attending residential treatment at this time. Patient said she struggles with pain in her knee and that she has been doing PT and it has been helping her a bit.  Patient said she was working on identifying her triggers before the group ended at [facility] and she would like to engage in treatment again as she found it helpful and got a lot of information out of the program.    Rational for recommended level of care: The patient lacks long-term sober maintenance skills and lacks sober coping skills.    The patient reported she is willing to follow the above recommendations.    The patient would like the following family or other support people involved in their treatment:  None at this time.    DAANES Assessment ID: [id number]    Provider Name/ Credentials:  Lorraine Mckay MA, [facility]  May 3, 2022            "

## 2022-05-04 NOTE — ADDENDUM NOTE
Encounter addended by: Lorraine Mckay LADC on: 5/4/2022 2:16 PM   Actions taken: Clinical Note Signed

## 2022-05-09 ENCOUNTER — BEH TREATMENT PLAN (OUTPATIENT)
Dept: ADDICTION MEDICINE | Facility: CLINIC | Age: 57
End: 2022-05-09
Payer: MEDICARE

## 2022-05-11 ENCOUNTER — TELEPHONE (OUTPATIENT)
Dept: ADDICTION MEDICINE | Facility: CLINIC | Age: 57
End: 2022-05-11
Payer: MEDICARE

## 2022-05-26 ENCOUNTER — VIRTUAL VISIT (OUTPATIENT)
Dept: BEHAVIORAL HEALTH | Facility: CLINIC | Age: 57
End: 2022-05-26
Payer: MEDICARE

## 2022-05-26 DIAGNOSIS — F11.20 OPIOID USE DISORDER, SEVERE, DEPENDENCE (H): Primary | ICD-10-CM

## 2022-05-26 PROCEDURE — 99214 OFFICE O/P EST MOD 30 MIN: CPT | Mod: 95 | Performed by: FAMILY MEDICINE

## 2022-05-26 NOTE — PROGRESS NOTES
Medications Phoned  to Pharmacy [] yes [x]no     Medications ordered this visit were e-scribed.  Verified by order class [] yes  [x] no  List medications sent to pharmacy:     Medication changes or discontinuations were communicated to patient's pharmacy: [] yes  [x] no     Dictation completed at time of chart check: [x] yes  [] no     I have checked the documentation for today s encounters and the above information has been reviewed and completed.        Rizwana Oneill, TOMMIE May 26, 2022 3:36 PM

## 2022-05-26 NOTE — PROGRESS NOTES
"     [facility]    A/P                                                    ASSESSMENT/PLAN    1. Opioid use disorder, severe, dependence (H)  We discussed her ongoing use and methadone taper.  Reviewed option of Suboxone.  She continues to give this option consideration.  Continues to work with her counselor at [facility] and discussing Suboxone with her as well.  She is feeling more motivated to abstain from opioid use.  Discussed future activities that she is looking forward to and we discussed having a list of activities she can do when she has the urge to use (crossword puzzles, movies, etc) and she was very receptive to this idea.      Additionally, we reviewed her plan to resume outpatient treatment.  She confirms she has phone number to set it up and will call ASAP.      We will continue to follow-up closely.    PDMP Review       Value Time User    State PDMP site checked  Yes 3/29/2022 10:13 AM Chetna Arteaga DO            RTC  Return in about 2 weeks (around 6/9/2022) for Follow up, with me, in person - 6/9 @ 1pm , using a phone visit.      Counseled the patient on the importance of having a recovery program in addition to medication to manage recovery.  Components include avoiding isolating, having willingness to change, avoiding triggers and managing cravings. Encouraged having some type of sober network and practicing honesty with trusted support person(s). Encouraged other services such as counseling, 12 step or other self-help organizations.        SUBJECTIVE                                                    Roger Fallon (\"Patrick\") is a 57 year old female with a history of chronic pain (lumbar herniated disc/stenosis, OA of knees), CPAP, pre-diabetes, complex partial epilepsy (last seizure was 6 years ago per patient), migraines, pituitary adenoma s/p resection, cannabis use disorder, and OUD who presents to clinic today for follow up    Visit performed Virtual, via " "telephone    Phone call duration:  18 minutes (11:10-11:28 AM)    Brief history of substance use:  Patrick began using opioids in her 30s when she was prescribed opioids for back and knee pain.  Progressed to intranasal heroin use.  Has been on methadone with [facility] since 2019 and heroin use has decreased, though she has not completely abstained.  She was being tapered off methadone due to ongoing use but then began outpatient treatment so she was ultimately continued on methadone.  Her outpatient program has been discontinued so her taper has resumed.  She has not been interested in Suboxone.  Continues to struggle with ongoing cravings and heroin use, citing pain as her trigger.  Goal for sobriety is also related to being able to proceed with TKA.       Plan from most recent office visit (4/26/22):    1. Opioid use disorder, severe, dependence (H)  She is struggling with ongoing methadone taper.  We discussed option of trying Suboxone again in the future as well as risks and benefits, she will consider.  Encouraged her to discuss this with [facility] as well as ideally the transition would happen there and then I am happy to take Suboxone script over.  Discussed looking for alternative outpatient treatment program and she is open to this so I have placed a new [facility] referral.    - Adult Mental Health  Referral; Future    TODAY'S VISIT  HPI May 26, 2022  - For past few weeks she continues with PT which continues to be very helpful!  Also had injections which seemed to help.    - Has been using heroin  - Wants to get this out of her system and be clean - \"I know I can do this.\"    - Reaching out to family for support in this  - Paranoid about overdose  - Has continued taper at [place] - she is currently at 75mg/day, dropping by 5mg/week  - Still unsure about Suboxone vs Methadone but \"she needs to stop using before anything else can happen\"  - Planning to resume groups with [facility]      OBJECTIVE        "                                             PHYSICAL EXAM:  Breastfeeding No     Speech is clear and coherent.  Thought process is normal, thought content normal.  Mood is normal, affect is appropriate.  Future oriented.  Insight/judgmment fair.  No coughing or wheezing, speaking in full sentences.      LAB  No results found for any visits on 05/26/22.      HISTORY                                                    Problem list reviewed & adjusted, as indicated.  Patient Active Problem List   Diagnosis     Chronic pain     Complex partial epilepsy (H)     Degeneration of intervertebral disc of lumbar region     Hypercholesterolemia     Insomnia     Major depressive disorder, recurrent episode, mild (H)     Migraine     Pituitary adenoma (H)     Reactive airway disease     Substance abuse (H)     Osteoarthritis of both knees     Methadone use     History of heroin abuse (H)     Foraminal stenosis of lumbar region     Osteoarthritis of both hands     PTSD (post-traumatic stress disorder)     Pre-diabetes     Opioid use disorder, severe, dependence (H)     Cannabis use disorder, mild, abuse         MEDICATION LIST (prior to visit)  albuterol (PROAIR HFA/PROVENTIL HFA/VENTOLIN HFA) 108 (90 Base) MCG/ACT inhaler, INHALE 1 PUFF BY MOUTH EVERY 4 HOURS FOR 14 DAYS AS NEEDED  celecoxib (CELEBREX) 100 MG capsule, TAKE 1 CAPSULE BY MOUTH TWICE DAILY AS NEEDED  metFORMIN (GLUCOPHAGE) 500 MG tablet, Take 500 mg by mouth 2 times daily (with meals)  METHADONE HCL PO, Take 95 mg by mouth daily  nicotine (NICORETTE) 2 MG gum, Place 1 each (2 mg) inside cheek every hour as needed for smoking cessation  OXcarbazepine (TRILEPTAL) 300 MG tablet, TAKE 2 TABLET BY MOUTH EVERY MORNING AND 3 TABLET BY MOUTH IN THE EVENING    No current facility-administered medications on file prior to visit.      MEDICATION LIST (after visit)  Current Outpatient Medications   Medication     albuterol (PROAIR HFA/PROVENTIL HFA/VENTOLIN HFA) 108 (90 Base)  MCG/ACT inhaler     celecoxib (CELEBREX) 100 MG capsule     metFORMIN (GLUCOPHAGE) 500 MG tablet     METHADONE HCL PO     nicotine (NICORETTE) 2 MG gum     OXcarbazepine (TRILEPTAL) 300 MG tablet     No current facility-administered medications for this visit.         Allergies   Allergen Reactions     Imitrex [Sumatriptan] Anaphylaxis     Penicillins Anaphylaxis     Sumatriptan Anaphylaxis and Hives     Cucumber Extract Swelling     Tongue     Ondansetron Hives     Pumpkin Seed Swelling     Tongue swells       Chetna Arteaga, [facility]  [facility]  [phone]

## 2022-05-26 NOTE — PROGRESS NOTES
This video/telephone visit will be conducted via a call between you and your physician/provider. We have found that certain health care needs can be provided without the need for an in-person physical exam. This service lets us provide the care you need with a video /telephone conversation. If a prescription is necessary we can send it directly to your pharmacy. If lab work is needed we can place an order for that and you can then stop by our lab to have the test done at a later time.    Just as we bill insurance for in-person visits, we also bill insurance for video/telephone visits. If you have questions about your insurance coverage, we recommend that you speak with your insurance company.    Patient has given verbal consent for video/Telephone visit? Yes    Patient would like telephone visit, please connect : Call [phone]  Reason for visit: Follow up   Patient verified allergies, medications and pharmacy via telephone.     ELVIRA-7 scores:    ELVIRA-7 SCORE 3/29/2022   Total Score 12     PHQ-9 scores:   PHQ-9 SCORE 3/29/2022   PHQ-9 Total Score 16   MN  to be reviewed by provider.   Medication refill is pended for review and approval by provider.  Patient states she  is ready for visit.  Rizwana Oneill, TOMMIE May 26, 2022 10:34 AM

## 2022-06-09 ENCOUNTER — VIRTUAL VISIT (OUTPATIENT)
Dept: BEHAVIORAL HEALTH | Facility: CLINIC | Age: 57
End: 2022-06-09
Attending: FAMILY MEDICINE
Payer: MEDICARE

## 2022-06-09 VITALS — WEIGHT: 210 LBS | BODY MASS INDEX: 41.01 KG/M2

## 2022-06-09 DIAGNOSIS — F11.20 OPIOID USE DISORDER, SEVERE, DEPENDENCE (H): Primary | ICD-10-CM

## 2022-06-09 PROCEDURE — 99214 OFFICE O/P EST MOD 30 MIN: CPT | Mod: 95 | Performed by: FAMILY MEDICINE

## 2022-06-09 RX ORDER — HYDROXYZINE HYDROCHLORIDE 25 MG/1
25-50 TABLET, FILM COATED ORAL AT BEDTIME
Qty: 60 TABLET | Refills: 0 | Status: SHIPPED | OUTPATIENT
Start: 2022-06-09

## 2022-06-09 ASSESSMENT — ANXIETY QUESTIONNAIRES
6. BECOMING EASILY ANNOYED OR IRRITABLE: SEVERAL DAYS
IF YOU CHECKED OFF ANY PROBLEMS ON THIS QUESTIONNAIRE, HOW DIFFICULT HAVE THESE PROBLEMS MADE IT FOR YOU TO DO YOUR WORK, TAKE CARE OF THINGS AT HOME, OR GET ALONG WITH OTHER PEOPLE: SOMEWHAT DIFFICULT
7. FEELING AFRAID AS IF SOMETHING AWFUL MIGHT HAPPEN: SEVERAL DAYS
GAD7 TOTAL SCORE: 5
2. NOT BEING ABLE TO STOP OR CONTROL WORRYING: SEVERAL DAYS
GAD7 TOTAL SCORE: 5
1. FEELING NERVOUS, ANXIOUS, OR ON EDGE: SEVERAL DAYS
3. WORRYING TOO MUCH ABOUT DIFFERENT THINGS: SEVERAL DAYS
5. BEING SO RESTLESS THAT IT IS HARD TO SIT STILL: NOT AT ALL

## 2022-06-09 ASSESSMENT — PATIENT HEALTH QUESTIONNAIRE - PHQ9
SUM OF ALL RESPONSES TO PHQ QUESTIONS 1-9: 7
5. POOR APPETITE OR OVEREATING: NOT AT ALL

## 2022-06-09 NOTE — NURSING NOTE
This video/telephone visit will be conducted via a call between you and your physician/provider. We have found that certain health care needs can be provided without the need for an in-person physical exam. This service lets us provide the care you need with a video /telephone conversation. If a prescription is necessary we can send it directly to your pharmacy. If lab work is needed we can place an order for that and you can then stop by our lab to have the test done at a later time.    Just as we bill insurance for in-person visits, we also bill insurance for video/telephone visits. If you have questions about your insurance coverage, we recommend that you speak with your insurance company.    Patient has given verbal consent for video/Telephone visit? yes    Patient would like telephone2 visit, please connect : [phone]    Reason for visit: follow up  Patient verified allergies, medications and pharmacy via phone.     ELVIRA-7 scores:    ELVIRA-7 SCORE 3/29/2022 6/9/2022   Total Score 12 5       PHQ-9 scores:   PHQ-9 SCORE 3/29/2022 6/9/2022   PHQ-9 Total Score 16 7       Patient states she  is ready for visit.  No refills needed at this time. Pt states she has some questions and concerns today but states she'll just talk to the provider about it  MN  to be reviewed by provider      Leonard Rodas MA June 9, 2022 1:05 PM

## 2022-06-09 NOTE — PROGRESS NOTES
"     [facility]    A/P                                                    ASSESSMENT/PLAN    1. Opioid use disorder, severe, dependence (H)  She continues to struggle with ongoing heroin use and continues methadone taper and [place] and having some mild withdrawal symptoms.  Trial of hydroxyzine for sleep.  She is undecided about buprenorphine.  Questions regarding buprenorphine induction answered.  I gave her phone number to contact IOP regarding intake and she will do this ASAP.  We will continue close follow-up.  Advised her she can call me before next visit with questions or concerns.     - hydrOXYzine (ATARAX) 25 MG tablet; Take 1-2 tablets (25-50 mg) by mouth At Bedtime  Dispense: 60 tablet; Refill: 0  - naloxone (NARCAN) 4 MG/0.1ML nasal spray; Spray 1 spray (4 mg) into one nostril alternating nostrils once as needed for opioid reversal every 2-3 minutes until assistance arrives  Dispense: 0.2 mL; Refill: 11      PDMP Review       Value Time User    State PDMP site checked  Yes 3/29/2022 10:13 AM Chetna Arteaga DO            RTC  Return in about 3 weeks (around 6/30/2022) for Follow up, with me, in person.      Counseled the patient on the importance of having a recovery program in addition to medication to manage recovery.  Components include avoiding isolating, having willingness to change, avoiding triggers and managing cravings. Encouraged having some type of sober network and practicing honesty with trusted support person(s). Encouraged other services such as counseling, 12 step or other self-help organizations.          SUBJECTIVE                                                    Roger Fallon (\"Patrick\") is a 57 year old female with a history of chronic pain (lumbar herniated disc/stenosis, OA of knees), CPAP, pre-diabetes, complex partial epilepsy (last seizure was 6 years ago per patient), migraines, pituitary adenoma s/p resection, cannabis use disorder, and " OUD who presents to clinic today for follow up     Visit performed Virtual, via telephone     Phone call duration:  23 minutes (1:10-1:33 PM)      Brief history of substance use:  Patrick began using opioids in her 30s when she was prescribed opioids for back and knee pain.  Progressed to intranasal heroin use.  Has been on methadone with [facility] since 2019 and heroin use has decreased, though she has not completely abstained.  She was being tapered off methadone due to ongoing use but then began outpatient treatment so she was ultimately continued on methadone.  Her outpatient program has been discontinued so her taper has resumed.  She has not been interested in Suboxone.  Continues to struggle with ongoing cravings and heroin use, citing pain as her trigger.  Goal for sobriety is also related to being able to proceed with TKA.       Plan from most recent office visit (5/26/22):   1. Opioid use disorder, severe, dependence (H)  We discussed her ongoing use and methadone taper.  Reviewed option of Suboxone.  She continues to give this option consideration.  Continues to work with her counselor at [facility] and discussing Suboxone with her as well.  She is feeling more motivated to abstain from opioid use.  Discussed future activities that she is looking forward to and we discussed having a list of activities she can do when she has the urge to use (crossword puzzles, movies, etc) and she was very receptive to this idea.       Additionally, we reviewed her plan to resume outpatient treatment.  She confirms she has phone number to set it up and will call ASAP.      TODAY'S VISIT  HPI Jun 9, 2022  - Continues working on abstinence, has achieved 2 days without use  - Sweating is the worst part of withdrawal but also thinks related to something else  - Current dose of methadone is 65mg/day - she talked to her counselor about Suboxone and they will discuss further on 7/1  - She is still on the fence about making change to  Suboxone  - Questions about how to start buprenorphine   - Trouble sleeping, restless legs with decreasing dose of methadone  - Has Narcan but would like refill    PHQ 3/29/2022 6/9/2022   PHQ-9 Total Score 16 7   Q9: Thoughts of better off dead/self-harm past 2 weeks Not at all Not at all     ELVIRA-7 SCORE 3/29/2022 6/9/2022   Total Score 12 5       OBJECTIVE                                                    PHYSICAL EXAM:  Wt 95.3 kg (210 lb)   Breastfeeding No   BMI 41.01 kg/m      Speech is clear and coherent.  She is speaking in full sentences.  No coughing or wheezing.  Mood is normal, affect is a bit flat.  Future oriented.  Thought content and process is normal.  Judgment/insight fair.      LAB  No results found for any visits on 06/09/22.      HISTORY                                                    Problem list reviewed & adjusted, as indicated.  Patient Active Problem List   Diagnosis     Chronic pain     Complex partial epilepsy (H)     Degeneration of intervertebral disc of lumbar region     Hypercholesterolemia     Insomnia     Major depressive disorder, recurrent episode, mild (H)     Migraine     Pituitary adenoma (H)     Reactive airway disease     Substance abuse (H)     Osteoarthritis of both knees     Methadone use     History of heroin abuse (H)     Foraminal stenosis of lumbar region     Osteoarthritis of both hands     PTSD (post-traumatic stress disorder)     Pre-diabetes     Opioid use disorder, severe, dependence (H)     Cannabis use disorder, mild, abuse         MEDICATION LIST (prior to visit)  albuterol (PROAIR HFA/PROVENTIL HFA/VENTOLIN HFA) 108 (90 Base) MCG/ACT inhaler, INHALE 1 PUFF BY MOUTH EVERY 4 HOURS FOR 14 DAYS AS NEEDED  celecoxib (CELEBREX) 100 MG capsule, TAKE 1 CAPSULE BY MOUTH TWICE DAILY AS NEEDED  metFORMIN (GLUCOPHAGE) 500 MG tablet, Take 500 mg by mouth 2 times daily (with meals)  METHADONE HCL PO, Take 95 mg by mouth daily  nicotine (NICORETTE) 2 MG gum, Place 1  each (2 mg) inside cheek every hour as needed for smoking cessation  OXcarbazepine (TRILEPTAL) 300 MG tablet, TAKE 2 TABLET BY MOUTH EVERY MORNING AND 3 TABLET BY MOUTH IN THE EVENING    No current facility-administered medications on file prior to visit.      MEDICATION LIST (after visit)  Current Outpatient Medications   Medication     albuterol (PROAIR HFA/PROVENTIL HFA/VENTOLIN HFA) 108 (90 Base) MCG/ACT inhaler     celecoxib (CELEBREX) 100 MG capsule     hydrOXYzine (ATARAX) 25 MG tablet     metFORMIN (GLUCOPHAGE) 500 MG tablet     METHADONE HCL PO     naloxone (NARCAN) 4 MG/0.1ML nasal spray     nicotine (NICORETTE) 2 MG gum     OXcarbazepine (TRILEPTAL) 300 MG tablet     No current facility-administered medications for this visit.         Allergies   Allergen Reactions     Imitrex [Sumatriptan] Anaphylaxis     Penicillins Anaphylaxis     Sumatriptan Anaphylaxis and Hives     Cucumber Extract Swelling     Tongue     Ondansetron Hives     Pumpkin Seed Swelling     Tongue swells       Chetna Arteaga, [facility]  [facility]  [phone]

## 2022-06-30 ENCOUNTER — VIRTUAL VISIT (OUTPATIENT)
Dept: BEHAVIORAL HEALTH | Facility: CLINIC | Age: 57
End: 2022-06-30
Attending: FAMILY MEDICINE
Payer: MEDICARE

## 2022-06-30 DIAGNOSIS — F11.20 OPIOID USE DISORDER, SEVERE, DEPENDENCE (H): Primary | ICD-10-CM

## 2022-06-30 PROCEDURE — 99442 PR PHYSICIAN TELEPHONE EVALUATION 11-20 MIN: CPT | Mod: 95 | Performed by: FAMILY MEDICINE

## 2022-06-30 NOTE — PROGRESS NOTES
"     [facility]    A/P                                                    ASSESSMENT/PLAN    1. Opioid use disorder, severe, dependence (H)  She continues on methadone taper per her methadone clinic due to ongoing opioid use, though she reports this is declining.  Remains undecided about buprenorphine, no further questions today.  Has not started treatment.  Continues with therapist.  Reviewed harm reduction - do not use alone, confirmed she has narcan, do not drive when using.  She will follow-up with me next month, sooner if needed.        PDMP Review       Value Time User    State PDMP site checked  Yes 3/29/2022 10:13 AM Chetna Arteaga DO          RTC  Return in about 4 weeks (around 7/28/2022) for Follow up, using a phone visit.      Counseled the patient on the importance of having a recovery program in addition to medication to manage recovery.  Components include avoiding isolating, having willingness to change, avoiding triggers and managing cravings. Encouraged having some type of sober network and practicing honesty with trusted support person(s). Encouraged other services such as counseling, 12 step or other self-help organizations.      Opioid warning reviewed.  Risk of overdose following a period of abstinence due to decrease tolerance was discussed including risk of death.  Strongly recommended abstain from alcohol, benzodiazepines, THC, opioids and other drugs of abuse.  Increased risk of return to opioid use after use of these substances discussed.  Increased risk of overdose/death with use of other substances particularly benzodiazepines/alcohol reviewed.        SUBJECTIVE                                                    Roger Fallon (\"Patrick\") is a 57 year old female with a history of chronic pain (lumbar herniated disc/stenosis, OA of knees), CPAP, pre-diabetes, complex partial epilepsy (last seizure was 6 years ago per patient), migraines, pituitary " adenoma s/p resection, cannabis use disorder, and OUD who presents to clinic today for follow up     Visit performed Virtual, via telephone     Phone call duration:  12 minutes     Brief history of substance use:  Patrick began using opioids in her 30s when she was prescribed opioids for back and knee pain.  Progressed to intranasal heroin use.  Has been on methadone with [facility] since 2019 and heroin use has decreased, though she has not completely abstained.  She was being tapered off methadone due to ongoing use but then began outpatient treatment so she was ultimately continued on methadone.  Her outpatient program has been discontinued so her taper has resumed.  She has not been interested in Suboxone.  Continues to struggle with ongoing cravings and heroin use, citing pain as her trigger.  Goal for sobriety is also related to being able to proceed with TKA.       Plan from most recent office visit (6/9/22):   1. Opioid use disorder, severe, dependence (H)  She continues to struggle with ongoing heroin use and continues methadone taper and [facility] and having some mild withdrawal symptoms.  Trial of hydroxyzine for sleep.  She is undecided about buprenorphine.  Questions regarding buprenorphine induction answered.  I gave her phone number to contact IOP regarding intake and she will do this ASAP.  We will continue close follow-up.  Advised her she can call me before next visit with questions or concerns.     - hydrOXYzine (ATARAX) 25 MG tablet; Take 1-2 tablets (25-50 mg) by mouth At Bedtime  Dispense: 60 tablet; Refill: 0  - naloxone (NARCAN) 4 MG/0.1ML nasal spray; Spray 1 spray (4 mg) into one nostril alternating nostrils once as needed for opioid reversal every 2-3 minutes until assistance arrives  Dispense: 0.2 mL; Refill: 11    TODAY'S VISIT  HPI Jun 30, 2022  - Things are moving slowly  - Down to 50mg of methadone, working with her methadone clinic  - Still using here and there but using less, only 3 times  "per week  - Drinking lemon and cinnamon drink to \"help detox her body\"  - Continues PT for her knee pain  - Continues therapy every 2 weeks, thinks this is helpful  - Hydroxyzine is helpful for sleep, taking every night, no side effects noted  - Trading one addiction to another    ELVIRA-7 SCORE 3/29/2022 6/9/2022   Total Score 12 5     PHQ 3/29/2022 6/9/2022   PHQ-9 Total Score 16 7   Q9: Thoughts of better off dead/self-harm past 2 weeks Not at all Not at all     OBJECTIVE                                                    PHYSICAL EXAM:  Breastfeeding No     Speech is clear and coherent.  Thought process and content are normal.  Mood is normal, affect appropriate.  Future oriented.  Speaking in full sentences, no coughing or wheezing.      LAB  No results found for any visits on 06/30/22.      HISTORY                                                    Problem list reviewed & adjusted, as indicated.  Patient Active Problem List   Diagnosis     Chronic pain     Complex partial epilepsy (H)     Degeneration of intervertebral disc of lumbar region     Hypercholesterolemia     Insomnia     Major depressive disorder, recurrent episode, mild (H)     Migraine     Pituitary adenoma (H)     Reactive airway disease     Substance abuse (H)     Osteoarthritis of both knees     Methadone use     History of heroin abuse (H)     Foraminal stenosis of lumbar region     Osteoarthritis of both hands     PTSD (post-traumatic stress disorder)     Pre-diabetes     Opioid use disorder, severe, dependence (H)     Cannabis use disorder, mild, abuse         MEDICATION LIST (prior to visit)  albuterol (PROAIR HFA/PROVENTIL HFA/VENTOLIN HFA) 108 (90 Base) MCG/ACT inhaler, INHALE 1 PUFF BY MOUTH EVERY 4 HOURS FOR 14 DAYS AS NEEDED  celecoxib (CELEBREX) 100 MG capsule, TAKE 1 CAPSULE BY MOUTH TWICE DAILY AS NEEDED  hydrOXYzine (ATARAX) 25 MG tablet, Take 1-2 tablets (25-50 mg) by mouth At Bedtime  metFORMIN (GLUCOPHAGE) 500 MG tablet, Take 500 " mg by mouth 2 times daily (with meals)  METHADONE HCL PO, Take 95 mg by mouth daily  naloxone (NARCAN) 4 MG/0.1ML nasal spray, Spray 1 spray (4 mg) into one nostril alternating nostrils once as needed for opioid reversal every 2-3 minutes until assistance arrives  nicotine (NICORETTE) 2 MG gum, Place 1 each (2 mg) inside cheek every hour as needed for smoking cessation  OXcarbazepine (TRILEPTAL) 300 MG tablet, TAKE 2 TABLET BY MOUTH EVERY MORNING AND 3 TABLET BY MOUTH IN THE EVENING    No current facility-administered medications on file prior to visit.      MEDICATION LIST (after visit)  Current Outpatient Medications   Medication     albuterol (PROAIR HFA/PROVENTIL HFA/VENTOLIN HFA) 108 (90 Base) MCG/ACT inhaler     celecoxib (CELEBREX) 100 MG capsule     hydrOXYzine (ATARAX) 25 MG tablet     metFORMIN (GLUCOPHAGE) 500 MG tablet     METHADONE HCL PO     naloxone (NARCAN) 4 MG/0.1ML nasal spray     nicotine (NICORETTE) 2 MG gum     OXcarbazepine (TRILEPTAL) 300 MG tablet     No current facility-administered medications for this visit.         Allergies   Allergen Reactions     Imitrex [Sumatriptan] Anaphylaxis     Penicillins Anaphylaxis     Sumatriptan Anaphylaxis and Hives     Cucumber Extract Swelling     Tongue     Ondansetron Hives     Pumpkin Seed Swelling     Tongue swells       Chetna Arteaga, [facility]  [facility]  [phone]

## 2022-06-30 NOTE — PROGRESS NOTES
This video/telephone visit will be conducted via a call between you and your physician/provider. We have found that certain health care needs can be provided without the need for an in-person physical exam. This service lets us provide the care you need with a video /telephone conversation. If a prescription is necessary we can send it directly to your pharmacy. If lab work is needed we can place an order for that and you can then stop by our lab to have the test done at a later time.    Just as we bill insurance for in-person visits, we also bill insurance for video/telephone visits. If you have questions about your insurance coverage, we recommend that you speak with your insurance company.    Patient has given verbal consent for video/Telephone visit? Yes    Patient would like telephone visit, please connect : [phone]  Reason for visit: Follow up  Patient verified allergies, medications and pharmacy via telephone.     ELVIRA-7 scores:    ELVIRA-7 SCORE 3/29/2022 6/9/2022   Total Score 12 5     PHQ-9 scores:   PHQ-9 SCORE 3/29/2022 6/9/2022   PHQ-9 Total Score 16 7   No refills needed at this visit.  Patient states she  is ready for visit.  Rizwana Oneill, TOMMIE June 30, 2022 1:26 PM

## 2022-07-19 ENCOUNTER — APPOINTMENT (OUTPATIENT)
Dept: URBAN - METROPOLITAN AREA CLINIC 260 | Age: 57
Setting detail: DERMATOLOGY
End: 2022-07-19

## 2022-07-19 VITALS — WEIGHT: 210 LBS | HEIGHT: 60 IN

## 2022-07-19 DIAGNOSIS — R21 RASH AND OTHER NONSPECIFIC SKIN ERUPTION: ICD-10-CM

## 2022-07-19 PROCEDURE — OTHER SEPARATE AND IDENTIFIABLE DOCUMENTATION: OTHER

## 2022-07-19 PROCEDURE — OTHER PRESCRIPTION MEDICATION MANAGEMENT: OTHER

## 2022-07-19 PROCEDURE — OTHER BIOPSY BY SHAVE METHOD: OTHER

## 2022-07-19 PROCEDURE — 99212 OFFICE O/P EST SF 10 MIN: CPT | Mod: 25

## 2022-07-19 PROCEDURE — OTHER MIPS QUALITY: OTHER

## 2022-07-19 PROCEDURE — OTHER COUNSELING: OTHER

## 2022-07-19 PROCEDURE — OTHER PRESCRIPTION: OTHER

## 2022-07-19 PROCEDURE — 11102 TANGNTL BX SKIN SINGLE LES: CPT

## 2022-07-19 RX ORDER — CLOBETASOL PROPIONATE 0.5 MG/G
0.05% CREAM TOPICAL BID
Qty: 60 | Refills: 2 | Status: ERX | COMMUNITY
Start: 2022-07-19

## 2022-07-19 RX ORDER — CLOBETASOL PROPIONATE 0.5 MG/G
CREAM TOPICAL BID
Qty: 60 | Refills: 2 | Status: ERX

## 2022-07-19 ASSESSMENT — LOCATION DETAILED DESCRIPTION DERM
LOCATION DETAILED: RIGHT PROXIMAL DORSAL FOREARM
LOCATION DETAILED: LEFT INFERIOR LATERAL NECK

## 2022-07-19 ASSESSMENT — LOCATION ZONE DERM
LOCATION ZONE: ARM
LOCATION ZONE: NECK

## 2022-07-19 ASSESSMENT — LOCATION SIMPLE DESCRIPTION DERM
LOCATION SIMPLE: LEFT ANTERIOR NECK
LOCATION SIMPLE: RIGHT FOREARM

## 2022-07-19 NOTE — PROCEDURE: PRESCRIPTION MEDICATION MANAGEMENT
Detail Level: Zone
Plan: Return as needed if not improved or worsening. Due to her diabetes I will not send in any oral medication for her to limit elevation of her blood glucose. Instead she will send in Clobetasol for her to use and I recommend good RX as the medication requires a prior authorization and patient is very itchy. The prescription was rerouted to [facility] or patient can pick it up more affordably
Render In Strict Bullet Format?: No
Initiate Treatment: clobetasol 0.05 % topical cream BID

## 2022-07-19 NOTE — PROCEDURE: BIOPSY BY SHAVE METHOD
Billing Type: Client Bill
Type Of Destruction Used: Curettage
Validate Lesion Size: No
Information: Selecting Yes will display possible errors in your note based on the variables you have selected. This validation is only offered as a suggestion for you. PLEASE NOTE THAT THE VALIDATION TEXT WILL BE REMOVED WHEN YOU FINALIZE YOUR NOTE. IF YOU WANT TO FAX A PRELIMINARY NOTE YOU WILL NEED TO TOGGLE THIS TO 'NO' IF YOU DO NOT WANT IT IN YOUR FAXED NOTE.
Curettage Text: The wound bed was treated with curettage after the biopsy was performed.
Hemostasis: Drysol
Biopsy Method: Dermablade
Path Notes Override (Will Replace All Of The Above Text): Please confirm margins
Electrodesiccation And Curettage Text: The wound bed was treated with electrodesiccation and curettage after the biopsy was performed.
X Size Of Lesion In Cm: 0
Anesthesia Type: 1% lidocaine with epinephrine
Biopsy Type: H and E
Notification Instructions: Patient will be notified of biopsy results. However, patient instructed to call the office if not contacted within 2 weeks.
Render Post-Care Instructions In Note?: yes
Wound Care: Petrolatum
Depth Of Biopsy: dermis
Anesthesia Volume In Cc (Will Not Render If 0): 0.5
Consent: Written consent was obtained and risks were reviewed including but not limited to scarring, infection, bleeding, scabbing, incomplete removal, nerve damage and allergy to anesthesia.
Electrodesiccation Text: The wound bed was treated with electrodesiccation after the biopsy was performed.
Silver Nitrate Text: The wound bed was treated with silver nitrate after the biopsy was performed.
Dressing: bandage
Post-Care Instructions: I reviewed with the patient in detail post-care instructions. Patient is to keep the biopsy site dry overnight, and then apply bacitracin twice daily until healed. Patient may apply hydrogen peroxide soaks to remove any crusting.
Detail Level: Detailed
Cryotherapy Text: The wound bed was treated with cryotherapy after the biopsy was performed.

## 2022-07-19 NOTE — HPI: RASH (ECZEMA)
Is This A New Presentation, Or A Follow-Up?: Follow Up Eczema
Additional History: She recalls using the clobetasol cream however does not have anymore

## 2022-08-24 NOTE — GROUP NOTE
Patient: Purvi Eastman Date: 2022   : 1935    87 year old female      OUTPATIENT WOUND CARE PROGRESS NOTE    Supervising Wound Care / Hyperbaric Medicine Physician: Dr. Cinthya Tariq  Consulting Provider:  Cinthya Tariq MD  Date of Consultation/Last Comprehensive Exam:  2021  Referring  Provider:  HINA Rodríguez    SUBJECTIVE:  \"I'm good. Yes, my [birth year] is [birth year].\"  Chief Complaint:  LLE venous stasis with edema and traumatic wound with ulcer, h/o CVA, h/o falls    Wound/Ulcer Present:    Other, venous stasis with edema and trauma with wound    Additional Wound Category:  None     Maximum Baseline Ambulatory Status:  Unable to assess    History of Present Illness:  This is a 87 year old female with history of stroke, chronic venous insufficiency with edema and varicose veins, history of bilateral total knee replacements, hypothyroidism referred for draining wound on left lower extremity.    Patient and has been are limited historian, medical records have been reviewed.  On 2022, patient was seen at the walk in clinic for left lateral lower extremity wound which was draining.  She was diagnosed as well with cellulitis of the left lower extremity and treated with Keflex for 7 day course.     2022 ALEKSANDRA was normal with normal waveforms.    Interval history (2022):    Left leg lateral ulcer, chronic venous ulcer is improving very well, however since last visit .No pain.No fever or chills. She has granulation tissue along with epithelial tissue   Trying to keep the feet elevated but usually has feet dependent for about 4 hours a day        Patient is here for f/up. Her left leg lateral ulcer dimensions have decreased . After removal of the scab she has a very superficial ulcer with granulation tissue . She has minimal drainage . No fever or chills. Wound care going well, but scab dislodged with dressing change today.        Current Treatment Regimen:  Dressing:   Group Therapy Documentation    PATIENT'S NAME: Roger Fallon  MRN:   [id number]  :   1965  ACCT. NUMBER: [id number]  DATE OF SERVICE: 21  START TIME:  8:30 AM  END TIME: 11:30 AM  FACILITATOR(S): Trista Best LADC; Nevin Monet LADC  TOPIC: BEH Group Therapy  Number of patients attending the group:  7  Group Length:  3 Hours    Group Therapy Type: Addiction and Psychoeducation    Summary of Group / Topics Discussed:    Balanced Lifestyle , Coping Skills/Lifestyle Managemet, Interpersonal Effectiveness, Meditation/Breathing Exercises, Mindfulness, Relationships, Self-Esteem/Self [place], and Thinking Errors/Negative Self-Talk    Telemedicine Visit: The patient's condition can be safely assessed and treated via synchronous audio and visual telemedicine encounter.      Reason for Telemedicine Visit: Services only offered telehealth    Originating Site (Patient Location): Patient's home    Distant Site (Provider Location): Provider Remote Setting- Home Office    Consent:  The patient/guardian has verbally consented to: the potential risks and benefits of telemedicine (video visit) versus in person care; bill my insurance or make self-payment for services provided; and responsibility for payment of non-covered services.     Mode of Communication:  Video Conference via [employer]    As the provider I attest to compliance with applicable laws and regulations related to telemedicine.     Supervising MD: Dr. Garber        Group Attendance:  Attended group session   Patient attended 2 hours of SRP group.    Patient's response to the group topic/interactions:  cooperative with task, discussed personal experience with topic, expressed understanding of topic, gave appropriate feedback to peers and listened actively    Patient appeared to be Actively participating, Attentive and Engaged.        Client specific details:  Patient discussed plans to go to [employer] and hand out flyers in [facility]  Silvasorb/Hydrogel .gauze,  Medium TG every 2 days.  Frequency:  Three times a week   Changed by:  Patient and Family    Review of Systems:  Pertinent items are noted in HPI (history of present illness).    Past Medical History:   Diagnosis Date   • Age-related nuclear cataract of left eye    • Arthritis    • Blood clot associated with vein wall inflammation    • Choroidal nevus of right eye    • Essential (primary) hypertension    • Hiatal hernia     Located under chest cavity area   • Hypothyroidism    • Osteoporosis, unspecified 12/29/1999   • Pseudophakia, right eye    • PVD (posterior vitreous detachment), both eyes    • Retinal tear of right eye    • Trochanteric bursitis of left hip 2/18/2016   • Venous stasis ulcer of left lower extremity (CMS/HCC) 3/25/2022     Past Surgical History:   Procedure Laterality Date   • Anes hammertoe or; 1 toe  1989   • Back surgery  1983    fusion, spinal fusion 1999   • Cataract extraction w/  intraocular lens implant Right 07/05/2013    SN60WF 20.5   • Colonoscopy  10/20/2009   • Dexa bone density axial skeleton  12/29/1999   • Hysterectomy  1991   • Laser surgery of eye Right 12/19/1987    retinal tear   • Rotator cuff repair Left 2011   • Scr mammo durga 2-view study each breast incl computer aided detection  07/26/2012   • Service to gastroenterology     • Tonsillectomy and adenoidectomy  1949   • Total hip replacement Bilateral 1998, 2008   • Total knee replacement Bilateral 2005,2008   • Varicose vein surgery Left 2012    laser     Social History     Socioeconomic History   • Marital status: /Civil Union     Spouse name: Not on file   • Number of children: Not on file   • Years of education: Not on file   • Highest education level: Not on file   Occupational History   • Not on file   Tobacco Use   • Smoking status: Never Smoker   • Smokeless tobacco: Never Used   Vaping Use   • Vaping Use: never used   Substance and Sexual Activity   • Alcohol use: Yes      for what she believes to be unfair business practices in the sale of a car to her son 12 + months ago that developed a hole in the transmission. She was open to receiving feedback from her group peers. She participated in educational group topic on Acceptance and Mindfulness.       Alcohol/week: 3.0 standard drinks     Types: 3 Cans of beer per week     Comment: reports occasional beer   • Drug use: No   • Sexual activity: Not on file   Other Topics Concern   •  Service Not Asked   • Blood Transfusions Not Asked   • Caffeine Concern Not Asked   • Occupational Exposure Not Asked   • Hobby Hazards Not Asked   • Sleep Concern Not Asked   • Stress Concern Not Asked   • Weight Concern Not Asked   • Special Diet Not Asked   • Back Care Not Asked   • Exercise Not Asked   • Bike Helmet Not Asked   • Seat Belt Yes   • Self-Exams Not Asked   Social History Narrative   • Not on file     Social Determinants of Health     Financial Resource Strain: Not on file   Food Insecurity: Not on file   Transportation Needs: Not on file   Physical Activity: Not on file   Stress: Not on file   Social Connections: Not on file   Intimate Partner Violence: Not on file     Family History   Problem Relation Age of Onset   • Cancer Mother    • Heart disease Mother    • Arthritis Mother    • High blood pressure Mother    • Cancer Father    • Cancer Maternal Grandfather        Current Outpatient Medications   Medication Sig   • indapamide (LOZOL) 2.5 MG tablet Take 1 tablet by mouth daily.   • levothyroxine 75 MCG tablet Take 1 tablet by mouth daily.   • ibuprofen (MOTRIN) 200 MG tablet Take 200 mg by mouth every 6 hours as needed.   • vitamin - therapeutic multivitamins w/minerals (CENTRUM SILVER,THERA-M) TABS Take 1 tablet by mouth daily.   • calcium carbonate-vitamin D (CALTRATE+D) 600-400 MG-UNIT per tablet Take 1 tablet by mouth daily.   • ferrous sulfate 325 (65 FE) MG tablet Take 325 mg by mouth daily (with breakfast).     No current facility-administered medications for this encounter.        ALLERGIES:  Adhesive   (environmental), Morphine, and Warfarin    OBJECTIVE:  Vital Signs:    Visit Vitals  BP (!) 143/74 (BP Location: LUE - Left upper extremity, Patient Position: Sitting)   Pulse 73   Temp 97.8 °F  (36.6 °C) (Temporal)   Resp 16   SpO2 96%         Physical Exam:  General appearance: Alert, in no distress and cooperative  Head:   normocephalic without obvious abnormality  Pulmonary: normal respiratory effort     08/24/22 left leg lateral ulcer is smaller and it has granulation tissue and epithelial tissue         08/03/2022  Left leg lateral ulcer  Ulcer is same size  Clean  Granulating  Epithelializing  No cellulitis  Edema+    Ulcer is improving  Continue same management, except improve edema control            07/20/22 left leg lateral ulcer after removal of scab with granulation tissue .     07/20/22 left leg lateral ulcer with scab present         6/30/22 LLE- 1-2+ edema, no malodor, fluctuance, or purulence.  Left lateral lower extremity distal ulcer-moderate granulation tissue and new epithelium small area of nonviable brown and  yellow tissue which was adherent.        LLE-2+ edema, mild periwound erythema likely due to adhesive.  No fluctuance or calor.  Left lateral lower extremity distal ulcer - dry adherent eschar was debrided to reveal small granular wound, no tunneling or undermining, no malodor, moderate serous drainage.  Palpable pedal pulses.        4/19/22   Postdebridement                                                                                                                                                                     Wound Bed Quality:  Granulation tissue, Bassem-epithelialization and And areas of hypergranulation tissue      Teresa-wound Quality:    Edema       Additional Descriptors:  drainage - scant serous    Wound Measurements Per Flowsheet:    Wound Eye Right Surgical incision (Active)   Number of days: 3337     Wound Leg Left Inferior/Lower;Lateral Traumatic (Active)   Wound Care Team Consult Date 03/25/22 03/25/22 0901   Wound Length (cm) 0.3 cm 08/24/22 1313   Wound Width (cm) 0.2 cm 08/24/22 1313   Wound Depth (cm) 0.1 cm 08/24/22 1313   Wound Surface Area (cm^2) 0.06  cm^2 22 1313   Wound Volume (cm^3) 0.006 cm^3 22 1313   Wound Volume Change (Initial) -0.75 cm3 22 1313   Wound Volume % Change (Initial) -99.21 % 22 1313   Wound Volume Change (30 days) -0.1 cm3 22 1045   Wound Volume % Change (30 days) -33.33 % 22 1045   Number of days: 152     PROCEDURE:     None performed   Procedure was Performed by:  Not applicable       Laboratory assessments reviewed:  No results found for: PAB   Albumin (g/dL)   Date Value   2021 3.2 (L)   2020 3.8   2019 3.7      No results available in last 24 hours    Lab Results   Component Value Date    WBC 5.8 2016    GLUCOSE 118 (H) 2021    CRP <0.3 2015    RESR 21 (H) 2015    CREATININE 0.60 2021    GFRA >90 2019    GFRNA 82 2019        Culture results:  No results found for: SDES No results found for: CULT     Diagnostic Assessments Reviewed:  No new update    Nutritional Assessment:  Appears adequate for healing    Wound treatment goals are palliative:  No    DIAGNOSES:  Traumatic wound Left lateral lower extremity with venous stasis  Chronic venous insufficiency with bilateral lower extremity edema  Status post CVA  Expressive aphasia    Medical Decision Makin-year-old female with history of CVA, chronic lower extremity edema and left lower extremity ulceration ; eschar dislodged today with dressing change. Patient felt she noted the most rapid improvement in her wound healing with Therabond. Will discontinue Florian Sorb gel and changed to Therabond.   We discussed today that localized impaired blood flow and chronic inflammation in the wound bed may be contributing to hinder healing, as well as discussion of over-the-counter measures which may improve this.  2022  Left lateral leg traumatic and venous stasis ulcer  Improving slowly  Continue edema control  Elevation, discussion with the patient and her , very important, she spends  many hours sitting with legs hanging, correct that  Compression.  She is using her own compression socks.  I am not sure if they are functioning well  So we are putting on a medium Tubigrip, she can finger if that is more compressive, she can by medium compression stockings    Continue same local care with Meron  Continue high protein diet  Continue management of medical problems    Followup 2-3 weeks    07/20/22   Left leg lateral ulcer is smaller no debridement done today . Scab came off while cleaning and she has granulation tissue present   LLE ulcer  Every 2-3 days and prn:  -clean with normal saline or mild soap and water, pat dry.  - apply Meron  and assist with moisture balance 3x/week and PRN  -secure with medium tetragrip    Edema: limb elevation, medium TG  Infection: no signs or symptoms at this time.  Nutrition: appears adequate for wound healing.  Labs/ imaging: none needed at this time.  Vascular: appears adequate for wound healing.  Nondiabetic.    Clinic follow-up 2-3 weeks or sooner PRN  All questions addressed.   Discharged in stable condition.      Plan of Care:  Advanced Wound Care Recommendations:  Not applicable  Percent Wound Closure from consult:   Care plan to augment wound closure:  Not applicable.  healing      For documentary and reference efficiency, portions of prior notes were copied in this document.    I have reviewed all portions and edited and added as necessary to provide relevant perspective on this patient's background, current condition, medical decision making, and plan for the Current Encounter.    Discussion performed about all aspects including diagnoses and management.  Questions encouraged and answered    Justin Wilson MD.  [facility].

## 2022-10-11 NOTE — TELEPHONE ENCOUNTER
FUTURE VISIT INFORMATION      FUTURE VISIT INFORMATION:    Date: 12/5/2022    Time: 130pm    Location: [facility]  REFERRAL INFORMATION:    Referring provider:  Self     Referring providers clinic:      Reason for visit/diagnosis  Follow-up     RECORDS REQUESTED FROM:       Clinic name Comments Records Status Imaging Status   Internal Dr. Mora-8/24/2020    MR Brain-9/29/2019, 11/4/2015 Epic PACS

## 2022-12-05 ENCOUNTER — PRE VISIT (OUTPATIENT)
Dept: NEUROLOGY | Facility: CLINIC | Age: 57
End: 2022-12-05

## 2022-12-05 ENCOUNTER — OFFICE VISIT (OUTPATIENT)
Dept: NEUROLOGY | Facility: CLINIC | Age: 57
End: 2022-12-05
Payer: MEDICARE

## 2022-12-05 ENCOUNTER — LAB (OUTPATIENT)
Dept: LAB | Facility: CLINIC | Age: 57
End: 2022-12-05
Payer: MEDICARE

## 2022-12-05 VITALS — OXYGEN SATURATION: 96 % | WEIGHT: 206.7 LBS | BODY MASS INDEX: 40.37 KG/M2

## 2022-12-05 DIAGNOSIS — Z79.899 ENCOUNTER FOR LONG-TERM (CURRENT) USE OF MEDICATIONS: ICD-10-CM

## 2022-12-05 DIAGNOSIS — G40.209 PARTIAL SYMPTOMATIC EPILEPSY WITH COMPLEX PARTIAL SEIZURES, NOT INTRACTABLE, WITHOUT STATUS EPILEPTICUS (H): ICD-10-CM

## 2022-12-05 DIAGNOSIS — G40.209 PARTIAL SYMPTOMATIC EPILEPSY WITH COMPLEX PARTIAL SEIZURES, NOT INTRACTABLE, WITHOUT STATUS EPILEPTICUS (H): Primary | ICD-10-CM

## 2022-12-05 LAB
ALBUMIN SERPL BCG-MCNC: 4.2 G/DL (ref 3.5–5.2)
ALP SERPL-CCNC: 93 U/L (ref 35–104)
ALT SERPL W P-5'-P-CCNC: 20 U/L (ref 10–35)
ANION GAP SERPL CALCULATED.3IONS-SCNC: 11 MMOL/L (ref 7–15)
AST SERPL W P-5'-P-CCNC: 26 U/L (ref 10–35)
BILIRUB SERPL-MCNC: 0.2 MG/DL
BUN SERPL-MCNC: 11 MG/DL (ref 6–20)
CALCIUM SERPL-MCNC: 9.7 MG/DL (ref 8.6–10)
CHLORIDE SERPL-SCNC: 100 MMOL/L (ref 98–107)
CREAT SERPL-MCNC: 0.81 MG/DL (ref 0.51–0.95)
DEPRECATED HCO3 PLAS-SCNC: 29 MMOL/L (ref 22–29)
GFR SERPL CREATININE-BSD FRML MDRD: 84 ML/MIN/1.73M2
GLUCOSE SERPL-MCNC: 93 MG/DL (ref 70–99)
POTASSIUM SERPL-SCNC: 3.7 MMOL/L (ref 3.4–5.3)
PROT SERPL-MCNC: 7 G/DL (ref 6.4–8.3)
SODIUM SERPL-SCNC: 140 MMOL/L (ref 136–145)

## 2022-12-05 PROCEDURE — 99000 SPECIMEN HANDLING OFFICE-LAB: CPT | Performed by: PATHOLOGY

## 2022-12-05 PROCEDURE — 36415 COLL VENOUS BLD VENIPUNCTURE: CPT | Performed by: PATHOLOGY

## 2022-12-05 PROCEDURE — 80053 COMPREHEN METABOLIC PANEL: CPT | Performed by: PATHOLOGY

## 2022-12-05 PROCEDURE — 80183 DRUG SCRN QUANT OXCARBAZEPIN: CPT | Mod: 90 | Performed by: PATHOLOGY

## 2022-12-05 PROCEDURE — 99213 OFFICE O/P EST LOW 20 MIN: CPT | Performed by: NURSE PRACTITIONER

## 2022-12-05 ASSESSMENT — PAIN SCALES - GENERAL: PAINLEVEL: NO PAIN (0)

## 2022-12-05 NOTE — LETTER
12/5/2022       RE: Roger Fallon  [address] [place] [address]     Dear Colleague,    Thank you for referring your patient, Roger Fallon, to the [facility]. Please see a copy of my visit note below.    ASSESSMENT AND PLAN:  History of epilepsy. No headaches or seizures since starting oxcarbazepine.   Plan to establish care with General Neurology/Epilepsy for follow up in the [facility]. Oxcarbazepine level and CMP for med monitoring.   Follow up sooner if any events suspicious for recurrent seizure activity.       Pituitary adenoma findings on brain MRI. Last brain MRI in 2019. Reports blurry vision at times and presumed normal eye exam recently. No new symptoms. Has been stable. Will monitor and re-image when symptomatic and changes on eye exam.         Subjective   Roger is a 57 year old presenting for the following health issues: Former patient of Dr Mora who patient was seen for seizures. Seizure treatment follow up. No headaches for sometime       HPI   Headaches may be 10 in the past 6 years since starting oxcarbazepine. Have not had a seizure in the long time. No headaches and no seizure. Has been tolerating oxcarbazepine well and has been taking it daily-600 mg am and 900 mg in the evening. No refills needed.     Patient saw Dr Mora for partial symptomatic epilepsy as a tentative diagnosis.   Per Dr Mora's note, We did do an imaging study, and it did show a pituitary cyst, which on subsequent followup was normal  Patient did follow up with Dr Mora once a year.     Eye exam every year and last eye exam about 6 months ago and got new glasses.     Pituitary adenoma findings on brain MRI. Last brain MRI in 2019. Reports blurry vision at times and presumed normal eye exam recently. No new symptoms. Has been stable. Will monitor and re-image when symptomatic and changes on eye exam.          Objective    Wt 93.8 kg (206 lb 11.2 oz)   SpO2 96%   BMI 40.37 kg/m    Body mass index is 40.37 kg/m .  Physical Exam   GENERAL: healthy, alert and no distress  EYES: Eyes grossly normal to inspection, PERRL and conjunctivae and sclerae normal  NECK: no adenopathy, no asymmetry, masses, or scars and thyroid normal to palpation  RESP: lungs clear to auscultation - no rales, rhonchi or wheezes  CV: regular rate and rhythm, normal S1 S2, no S3 or S4, no murmur, click or rub, no peripheral edema and peripheral pulses strong  MS: no gross musculoskeletal defects noted, no edema  NEURO: Normal strength and tone, mentation intact and speech normal  PSYCH: mentation appears normal, affect normal/bright    Diagnostic Test Results reviewed  Last oxcarb level 9.4 in 2019    Brain/ Pituitary MRI without and with contrast     History: Benign neoplasm of pituitary gland; FOLLOW UP PITUITARY  LESSION ; Partial symptomatic epilepsy with complex partial seizures,  not intractable, without status epilepticus (H)     Comparison:  MRI brain 11/4/2015      Technique: Axial diffusion and FLAIR images of the whole brain  obtained without intravenous contrast. Sagittal T1 and T2-weighted,  coronal T2-weighted, coronal T1-weighted images with focus on the  sella were obtained without intravenous contrast. Post intravenous  contrast using gadolinium coronal and sagittal T1-weighted images were  obtained focused on the sella. Dynamic postcontrast coronal  T1-weighted images were also obtained.     Contrast: Gadavist 10 mL     Findings:    No mass is demonstrated within the sella. The optic chiasm appears  intact and not displaced. There is no significant change in  intrasellar cystic lesion measuring 1.1 cm. Neurohypophysis is not  well seen.      The major cavernous carotid vascular flow-voids appear patent.       FLAIR images through the whole brain are unremarkable, and demonstrate  no mass effect, midline shift, or significant  enlargement of the  ventricles. Multiple nonspecific foci of T2 white matter  hyperintensity, slightly greater than expected for age. Incidentally  noted developmental venous abnormally within the cerebellar vermis.     Trace mucosal thickenings within the bilateral maxillary antrum,  otherwise, visualized paranasal sinuses and mastoid air cells are  relatively clear.                                                                      Impression:   1. 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   2. Several nonspecific foci of T2 white matter hyperintensity. These  could be sequela of prior inflammatory/infectious or vasculopathic  insult.      I have personally reviewed the examination and initial interpretation  and I agree with the findings.     SEGUN JIMÉNEZ MD    I discussed all my recommendations with Roger Fallon who verbalizes understanding and comfortable with the plan.  All of patient's questions were answered from the best of my knowledge.  Patient is in agreement with the plan.     23 minutes spent on the date of the encounter doing face to face  access, chart  review, exam, results review,  meds review, treatment plan, documentation and further activities as noted above           Again, thank you for allowing me to participate in the care of your patient.      Sincerely,    RACHELLE Martin CNP

## 2022-12-05 NOTE — PATIENT INSTRUCTIONS
Oxcarbazepine level and CMP for med monitoring.   Follow up sooner if any events suspicious for recurrent seizure activity.       Pituitary adenoma findings on brain MRI. Last brain MRI in 2019. Reports blurry vision at times and presumed normal eye exam recently. No new symptoms. Has been stable. Will monitor and re-image when symptomatic and changes on eye exam.

## 2022-12-05 NOTE — PROGRESS NOTES
ASSESSMENT AND PLAN:  History of epilepsy. No headaches or seizures since starting oxcarbazepine.   Plan [facility] Oxcarbazepine level and CMP for med monitoring.   Follow up sooner if any events suspicious for recurrent seizure activity.       Pituitary adenoma findings on brain MRI. Last brain MRI in 2019. Reports blurry vision at times and presumed normal eye exam recently. No new symptoms. Has been stable. Will monitor and re-image when symptomatic and changes on eye exam.         Denia Duran is a 57 year old presenting for the following health issues: Former patient of Dr Mora who patient was seen for seizures. Seizure treatment follow up. No headaches for sometime       HPI   Headaches may be 10 in the past 6 years since starting oxcarbazepine. Have not had a seizure in the long time. No headaches and no seizure. Has been tolerating oxcarbazepine well and has been taking it daily-600 mg am and 900 mg in the evening. No refills needed.     Patient saw Dr Mora for partial symptomatic epilepsy as a tentative diagnosis.   Per Dr Mora's note, We did do an imaging study, and it did show a pituitary cyst, which on subsequent followup was normal  Patient did follow up with Dr Mora once a year.     Eye exam every year and last eye exam about 6 months ago and got new glasses.     Pituitary adenoma findings on brain MRI. Last brain MRI in 2019. Reports blurry vision at times and presumed normal eye exam recently. No new symptoms. Has been stable. Will monitor and re-image when symptomatic and changes on eye exam.         Objective    Wt 93.8 kg (206 lb 11.2 oz)   SpO2 96%   BMI 40.37 kg/m    Body mass index is 40.37 kg/m .  Physical Exam   GENERAL: healthy, alert and no distress  EYES: Eyes grossly normal to inspection, PERRL and conjunctivae and sclerae normal  NECK: no adenopathy, no asymmetry, masses, or scars and thyroid normal to  palpation  RESP: lungs clear to auscultation - no rales, rhonchi or wheezes  CV: regular rate and rhythm, normal S1 S2, no S3 or S4, no murmur, click or rub, no peripheral edema and peripheral pulses strong  MS: no gross musculoskeletal defects noted, no edema  NEURO: Normal strength and tone, mentation intact and speech normal  PSYCH: mentation appears normal, affect normal/bright    Diagnostic Test Results reviewed  Last oxcarb level 9.4 in 2019    Brain/ Pituitary MRI without and with contrast     History: Benign neoplasm of pituitary gland; FOLLOW UP PITUITARY  LESSION ; Partial symptomatic epilepsy with complex partial seizures,  not intractable, without status epilepticus (H)     Comparison:  MRI brain 11/4/2015      Technique: Axial diffusion and FLAIR images of the whole brain  obtained without intravenous contrast. Sagittal T1 and T2-weighted,  coronal T2-weighted, coronal T1-weighted images with focus on the  sella were obtained without intravenous contrast. Post intravenous  contrast using gadolinium coronal and sagittal T1-weighted images were  obtained focused on the sella. Dynamic postcontrast coronal  T1-weighted images were also obtained.     Contrast: Gadavist 10 mL     Findings:    No mass is demonstrated within the sella. The optic chiasm appears  intact and not displaced. There is no significant change in  intrasellar cystic lesion measuring 1.1 cm. Neurohypophysis is not  well seen.      The major cavernous carotid vascular flow-voids appear patent.       FLAIR images through the whole brain are unremarkable, and demonstrate  no mass effect, midline shift, or significant enlargement of the  ventricles. Multiple nonspecific foci of T2 white matter  hyperintensity, slightly greater than expected for age. Incidentally  noted developmental venous abnormally within the cerebellar vermis.     Trace mucosal thickenings within the bilateral maxillary antrum,  otherwise, visualized paranasal sinuses and  mastoid air cells are  relatively clear.                                                                      Impression:   1. 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   2. Several nonspecific foci of T2 white matter hyperintensity. These  could be sequela of prior inflammatory/infectious or vasculopathic  insult.      I have personally reviewed the examination and initial interpretation  and I agree with the findings.     SEGUN JIMÉNEZ MD    I discussed all my recommendations with Roger Fallon who verbalizes understanding and comfortable with the plan.  All of patient's questions were answered from the best of my knowledge.  Patient is in agreement with the plan.     23 minutes spent on the date of the encounter doing face to face  access, chart  review, exam, results review,  meds review, treatment plan, documentation and further activities as noted above    RACHELLE Fragoso, CNP [facility]  Headache certified  [facility]

## 2022-12-07 ENCOUNTER — TELEPHONE (OUTPATIENT)
Dept: NEUROLOGY | Facility: CLINIC | Age: 57
End: 2022-12-07

## 2022-12-07 NOTE — TELEPHONE ENCOUNTER
Called pt and let her know marko Juares,her lab results are normal and she verbally understood.

## 2022-12-08 LAB — 10OH-CARBAZEPINE SERPL-MCNC: 7 UG/ML

## 2022-12-23 NOTE — TELEPHONE ENCOUNTER
RECORDS RECEIVED FROM: internal   REASON FOR VISIT: seizures   Date of Appt: 2/6/23   NOTES (FOR ALL VISITS) STATUS DETAILS   OFFICE NOTE from referring provider Internal Blanquita Shanks NP @ [facility]:  12/5/22   OFFICE NOTE from other specialist Internal Dr Mora @ [facility]:  8/24/20 8/26/19 4/18/17  (additional)   MEDICATION LIST Internal    IMAGING  (FOR ALL VISITS)     MRI (HEAD, NECK, SPINE) Internal [facility]:  MRI Brain 9/29/19  MRI Brain 11/4/15

## 2023-02-06 ENCOUNTER — OFFICE VISIT (OUTPATIENT)
Dept: NEUROLOGY | Facility: CLINIC | Age: 58
End: 2023-02-06
Payer: MEDICARE

## 2023-02-06 ENCOUNTER — PRE VISIT (OUTPATIENT)
Dept: NEUROLOGY | Facility: CLINIC | Age: 58
End: 2023-02-06

## 2023-02-06 VITALS — HEART RATE: 73 BPM | DIASTOLIC BLOOD PRESSURE: 78 MMHG | SYSTOLIC BLOOD PRESSURE: 118 MMHG | OXYGEN SATURATION: 98 %

## 2023-02-06 DIAGNOSIS — G40.209 PARTIAL SYMPTOMATIC EPILEPSY WITH COMPLEX PARTIAL SEIZURES, NOT INTRACTABLE, WITHOUT STATUS EPILEPTICUS (H): ICD-10-CM

## 2023-02-06 DIAGNOSIS — D35.2 PITUITARY ADENOMA (H): Primary | ICD-10-CM

## 2023-02-06 PROCEDURE — 99417 PROLNG OP E/M EACH 15 MIN: CPT | Performed by: PSYCHIATRY & NEUROLOGY

## 2023-02-06 PROCEDURE — 99215 OFFICE O/P EST HI 40 MIN: CPT | Performed by: PSYCHIATRY & NEUROLOGY

## 2023-02-06 RX ORDER — OXCARBAZEPINE 300 MG/1
TABLET, FILM COATED ORAL
Qty: 450 TABLET | Refills: 3 | Status: SHIPPED | OUTPATIENT
Start: 2023-02-06 | End: 2023-05-16

## 2023-02-06 RX ORDER — ATORVASTATIN CALCIUM 20 MG/1
1 TABLET, FILM COATED ORAL
COMMUNITY
Start: 2023-01-14

## 2023-02-06 ASSESSMENT — PAIN SCALES - GENERAL: PAINLEVEL: MODERATE PAIN (5)

## 2023-02-06 NOTE — LETTER
"2/6/2023       RE: Roger Fallon  [address]  [place] [address]     Dear Colleague,    Thank you for referring your patient, Roger Fallon, to the [facility] [place] at [facility]. Please see a copy of my visit note below.      [facility]:  NEW PATIENT EVALUATION         Service Date: 02/06/2023     HISTORY:  Ms. Tracey Fallon is a 57-year-old, right-handed woman who was referred for a second opinion concerning a diagnosis of partial epilepsy.  She came alone to the visit today and was able to provide moderately detailed medical history.  I reviewed extensive outside medical records from Neurology clinics and other locations in [place] through [facility], and records from [facility].     Ictal semiology-history:   The patient reported that she had 2 types of seizures in the past, as convulsive and nonconvulsive seizures, which have not recurred for many years.  She wonders whether she needs to continue on antiseizure medication.     The first type of seizure is a nonconvulsive event that has been diagnosed as a complex partial seizure.  These began approximately in 1998, and occurred more than 10 times per year for at least several years, before ceasing with increased dosing of oxcarbazepine in 2008.  She had a stereotyped typical aura consisting of an illusion of feeling \"heavy\" and a peculiar mental feeling she could not further describe, with slight bilateral hand tremors, progressing within less than a minute to full loss of consciousness, after which she felt tired and forgetful for many minutes.  These were often witnessed by others, who noted that she became very quiet and had unresponsive staring, sometimes with chewing movements for a minute or two.      The convulsive seizures were diagnosed as generalized tonic-clonic seizures.  These also began approximately in 1998, continuing at " 3 per year or less until they ceased altogether in approximately .  These began with the same warning aura as the complex partial seizures, or with no warning at all before sudden loss of consciousness.  She would awaken with generalized aching, confusion lasting hours, lethargy and often a bitten tongue, but rarely evidence of urinary incontinence.  Observers told her that she would fall down if standing, have stiff extension and then generalized jerking for a minute or so.  She does not think that she ever had more than 1 grand mal seizure on the same day.    The patient denied that she has ever had a non-convulsive falling with unconsciousness, repetitive involuntary movements or posturing, sudden brief confusion, or other paroxysmal phenomena.  She denied any history of sudden involuntary jerks while fully awake, but she has had hypnic jerks.      The patient does not recognize exacerbating or remitting factors with regard to seizure frequency.      Epilepsy-seizure predispositions:   The patient has a family history that includes probable complex partial seizures of adult onset in her paternal grandfather.  Two paternal cousins also had epilepsy requiring medication treatment in adulthood, but she does not know anything about the types of seizures they had.     She has no history of gestational or  injury, febrile convulsions, developmental delay, stroke, meningitis, encephalitis, significant head injury, or other epileptic predispositions.  She denied a history of physical or sexual abuse by an adult during her childhood or adulthood.      Laboratory evaluations:   The patient has had a number of brain MRIs that showed a pituitary cyst, but no brain abnormalities, most recently performed in the [facility] system on 2019.     The patient has had electroencephalograms in the past, but she does not know the results of these studies.     The patient initially was treated with divalproex, but this  was stopped due to inadequate seizure control.  She then was treated with gabapentin, tiagabine, zonisamide and finally oxcarbazepine.  She had adverse effects with some of these medications, but the one she remembers best was leg swelling while on zonisamide.  She is currently on oxcarbazepine, with no reported adverse effects.  On the current dose, a blood level was 7 mcg/mL on 12/05/2022.    PAST MEDICAL-SURGICAL HISTORY:    1.  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  2.  History of obstructive sleep apnea.  3.  Probable cystic pituitary adenoma.  4.  Opioid dependence, with chronic methadone therapy.  5.  Osteoarthritis with chronic multifocal pain.    FAMILY HISTORY:  Family history of seizures is as noted above.  There is no family history of other neurological conditions.      PERSONAL AND SOCIAL HISTORY:  The patient [place] up in the [place], and in adulthood she has lived for prolonged periods in [place], back in [place], and most recently in [place] again.  She graduated from high school.  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  She lives with her daughter and granddaughter.  She reported that she used prescription opioids for chronic pain, but became dependent, and also occasionally used marijuana, but no other illicit recreational drugs.  She completed a chemical dependency treatment program and is on chronic methadone maintenance.  She does not use ethanol.    REVIEW OF SYSTEMS:  The patient has had some difficulties with depression and anxiety, but she denied suicidal ideation today or in the past.  She denied history of attention and memory disturbance, difficulties with comprehension and expression, difficulty in solving problems, weakness, tremors or other abnormal involuntary movements, numbness or tingling, incoordination, falling or difficulty in walking, urinary or fecal  incontinence, headache and other pain, except as described above.  She denied rashes and easy bruising.  The remainder of a 14-system symptom review was negative except as noted above.       MEDICATIONS:  Oxcarbazepine at 600/900 mg daily, and other medications as per the electronic medical record.     PHYSICAL EXAMINATION:    On examination the patient was an alert woman in no distress while seated, but with obvious distress when standing or walking, which she attributed to bilateral knee pain.  Pulse 73, blood pressure 118/78, respirations 16 per minute.  Skull was normocephalic and atraumatic.  Neck was supple, without signs of meningeal irritation.      On neurological examination, the patient appeared alert and was fully oriented to person, place, time, and reason for visit.  Speech showed normal articulation, fluency, repetitions, naming, syntax and comprehension.  She accurately repeated digits sequences, repeating 8 forward and 5 reversed.  At 10 minutes from presentation, 4 of 4 phrases were recalled.  No apraxias or atavistic signs were elicited.  Cranial nerves III through XII were normal.  Muscle masses, tones and strengths were normal throughout.  There was no pronator drift.  Sequential fine finger movements were performed normally with each hand.  No spontaneous tremors, myoclonus, or other abnormal movements were observed.  Sensations of light touch, pin prick, vibration and proprioception were reportedly normal throughout.  The rapid alternating movements, and finger-nose-finger and heel-shin maneuvers were performed normally bilaterally.  Romberg maneuver was negative.  Deep tendon reflexes were normal and symmetric throughout.  Toes were downgoing bilaterally.  She demonstrated an antalgic gait and was not able to tandem walk due to exacerbation of bilateral knee pain.    IMPRESSION:    The patient has a history of focal epilepsy with well-controlled seizures, which previously consisted of focal  impaired and secondarily generalized tonic-clonic seizures.  Brain MRI scans have not shown any likely causative cerebral lesions, although an apparent cystic adenoma of the pituitary has been followed intermittently.  She is quite concerned about this and asked me to order a follow-up brain MRI, now some 4 years after the most recent MRI, for this purpose.     The patient is doing well in seizure control, with no evidence of lacosamide adverse effects.  We will check an electroencephalogram, because this information has not been obtained in the [facility] system and is not available elsewhere.  The finding of generalized spike-wave discharges or other contradictory information will be of some concern in this regard.  I will address her question of future AED use after the brain MRI and EEG have been completed.       The patient reported that she is under evaluation for possible bilateral total knee arthroplasties with an orthopedic surgeon.     The patient indicated that she has continued successfully in a methadone maintenance program, with no concerns over other substance use.  She has several sources of stress in her life, but she feels that she is coping well with it and she denied thoughts of death or suicide.     The patient does not drive, by choice.    PLAN:    1.  Continue oxcarbazepine at 600/900 mg daily.  2.  Outpatient 3-hour video EEG.  3.  Brain 3 Svitlana MRI.  4.  Return visit in approximately 3 months.    I spent 79 minutes in this patient care, with 61 minutes in direct patient contact, and 18 minutes in chart review and document preparation on the day of the visit.       Colten Lim M.D.   Professor of Neurology        D: 2023   T: 2023   MT: coral    Name:     BETTY BOWENBenjamin  MRN:      [id number]        Account:      [id number]   :      1965           Service Date: 2023       Document: [id number]

## 2023-02-07 NOTE — PROGRESS NOTES
"  [facility]:  NEW PATIENT EVALUATION         Service Date: 02/06/2023     HISTORY:  Ms. Tracey Fallon is a 57-year-old, right-handed woman who was referred for a second opinion concerning a diagnosis of partial epilepsy.  She came alone to the visit today and was able to provide moderately detailed medical history.  I reviewed extensive outside medical records from Neurology clinics and other locations in [place] through [facility], and records from [facility].     Ictal semiology-history:   The patient reported that she had 2 types of seizures in the past, as convulsive and nonconvulsive seizures, which have not recurred for many years.  She wonders whether she needs to continue on antiseizure medication.     The first type of seizure is a nonconvulsive event that has been diagnosed as a complex partial seizure.  These began approximately in 1998, and occurred more than 10 times per year for at least several years, before ceasing with increased dosing of oxcarbazepine in 2008.  She had a stereotyped typical aura consisting of an illusion of feeling \"heavy\" and a peculiar mental feeling she could not further describe, with slight bilateral hand tremors, progressing within less than a minute to full loss of consciousness, after which she felt tired and forgetful for many minutes.  These were often witnessed by others, who noted that she became very quiet and had unresponsive staring, sometimes with chewing movements for a minute or two.      The convulsive seizures were diagnosed as generalized tonic-clonic seizures.  These also began approximately in 1998, continuing at 3 per year or less until they ceased altogether in approximately 2006.  These began with the same warning aura as the complex partial seizures, or with no warning at all before sudden loss of consciousness.  She would awaken with generalized aching, confusion lasting hours, lethargy and often a bitten tongue, but " rarely evidence of urinary incontinence.  Observers told her that she would fall down if standing, have stiff extension and then generalized jerking for a minute or so.  She does not think that she ever had more than 1 grand mal seizure on the same day.    The patient denied that she has ever had a non-convulsive falling with unconsciousness, repetitive involuntary movements or posturing, sudden brief confusion, or other paroxysmal phenomena.  She denied any history of sudden involuntary jerks while fully awake, but she has had hypnic jerks.      The patient does not recognize exacerbating or remitting factors with regard to seizure frequency.      Epilepsy-seizure predispositions:   The patient has a family history that includes probable complex partial seizures of adult onset in her paternal grandfather.  Two paternal cousins also had epilepsy requiring medication treatment in adulthood, but she does not know anything about the types of seizures they had.     She has no history of gestational or  injury, febrile convulsions, developmental delay, stroke, meningitis, encephalitis, significant head injury, or other epileptic predispositions.  She denied a history of physical or sexual abuse by an adult during her childhood or adulthood.      Laboratory evaluations:   The patient has had a number of brain MRIs that showed a pituitary cyst, but no brain abnormalities, most recently performed in the [facility] system on 2019.     The patient has had electroencephalograms in the past, but she does not know the results of these studies.     The patient initially was treated with divalproex, but this was stopped due to inadequate seizure control.  She then was treated with gabapentin, tiagabine, zonisamide and finally oxcarbazepine.  She had adverse effects with some of these medications, but the one she remembers best was leg swelling while on zonisamide.  She is currently on oxcarbazepine, with no reported  adverse effects.  On the current dose, a blood level was 7 mcg/mL on 12/05/2022.    PAST MEDICAL-SURGICAL HISTORY:    1.  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  2.  History of obstructive sleep apnea.  3.  Probable cystic pituitary adenoma.  4.  Opioid dependence, with chronic methadone therapy.  5.  Osteoarthritis with chronic multifocal pain.    FAMILY HISTORY:  Family history of seizures is as noted above.  There is no family history of other neurological conditions.      PERSONAL AND SOCIAL HISTORY:  The [place] in the [place], and in adulthood she has lived for prolonged periods in [place], back in [place], and most recently in [place] again.  She graduated from high school.  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  She lives with her daughter and granddaughter.  She reported that she used prescription opioids for chronic pain, but became dependent, and also occasionally used marijuana, but no other illicit recreational drugs.  She completed a chemical dependency treatment program and is on chronic methadone maintenance.  She does not use ethanol.    REVIEW OF SYSTEMS:  The patient has had some difficulties with depression and anxiety, but she denied suicidal ideation today or in the past.  She denied history of attention and memory disturbance, difficulties with comprehension and expression, difficulty in solving problems, weakness, tremors or other abnormal involuntary movements, numbness or tingling, incoordination, falling or difficulty in walking, urinary or fecal incontinence, headache and other pain, except as described above.  She denied rashes and easy bruising.  The remainder of a 14-system symptom review was negative except as noted above.       MEDICATIONS:  Oxcarbazepine at 600/900 mg daily, and other medications as per the electronic medical record.     PHYSICAL  EXAMINATION:    On examination the patient was an alert woman in no distress while seated, but with obvious distress when standing or walking, which she attributed to bilateral knee pain.  Pulse 73, blood pressure 118/78, respirations 16 per minute.  Skull was normocephalic and atraumatic.  Neck was supple, without signs of meningeal irritation.      On neurological examination, the patient appeared alert and was fully oriented to person, place, time, and reason for visit.  Speech showed normal articulation, fluency, repetitions, naming, syntax and comprehension.  She accurately repeated digits sequences, repeating 8 forward and 5 reversed.  At 10 minutes from presentation, 4 of 4 phrases were recalled.  No apraxias or atavistic signs were elicited.  Cranial nerves III through XII were normal.  Muscle masses, tones and strengths were normal throughout.  There was no pronator drift.  Sequential fine finger movements were performed normally with each hand.  No spontaneous tremors, myoclonus, or other abnormal movements were observed.  Sensations of light touch, pin prick, vibration and proprioception were reportedly normal throughout.  The rapid alternating movements, and finger-nose-finger and heel-shin maneuvers were performed normally bilaterally.  Romberg maneuver was negative.  Deep tendon reflexes were normal and symmetric throughout.  Toes were downgoing bilaterally.  She demonstrated an antalgic gait and was not able to tandem walk due to exacerbation of bilateral knee pain.    IMPRESSION:    The patient has a history of focal epilepsy with well-controlled seizures, which previously consisted of focal impaired and secondarily generalized tonic-clonic seizures.  Brain MRI scans have not shown any likely causative cerebral lesions, although an apparent cystic adenoma of the pituitary has been followed intermittently.  She is quite concerned about this and asked me to order a follow-up brain MRI, now some 4 years  after the most recent MRI, for this purpose.     The patient is doing well in seizure control, with no evidence of lacosamide adverse effects.  We will check an electroencephalogram, because this information has not been obtained in the [facility] system and is not available elsewhere.  The finding of generalized spike-wave discharges or other contradictory information will be of some concern in this regard.  I will address her question of future AED use after the brain MRI and EEG have been completed.       The patient reported that she is under evaluation for possible bilateral total knee arthroplasties with an orthopedic surgeon.     The patient indicated that she has continued successfully in a methadone maintenance program, with no concerns over other substance use.  She has several sources of stress in her life, but she feels that she is coping well with it and she denied thoughts of death or suicide.     The patient does not drive, by choice.    PLAN:    1.  Continue oxcarbazepine at 600/900 mg daily.  2.  Outpatient 3-hour video EEG.  3.  Brain 3 Svitlana MRI.  4.  Return visit in approximately 3 months.    I spent 79 minutes in this patient care, with 61 minutes in direct patient contact, and 18 minutes in chart review and document preparation on the day of the visit.       Colten Lim M.D.   Professor of Neurology        D: 2023   T: 2023   MT: coral    Name:     BETTY BOWEN  MRN:      [id number]        Account:      [id number]   :      1965           Service Date: 2023       Document: [id number]

## 2023-04-18 ENCOUNTER — ANCILLARY PROCEDURE (OUTPATIENT)
Dept: NEUROLOGY | Facility: CLINIC | Age: 58
End: 2023-04-18
Attending: PSYCHIATRY & NEUROLOGY
Payer: MEDICARE

## 2023-04-18 DIAGNOSIS — G40.209 PARTIAL SYMPTOMATIC EPILEPSY WITH COMPLEX PARTIAL SEIZURES, NOT INTRACTABLE, WITHOUT STATUS EPILEPTICUS (H): ICD-10-CM

## 2023-04-18 PROCEDURE — 95700 EEG CONT REC W/VID EEG TECH: CPT | Performed by: PSYCHIATRY & NEUROLOGY

## 2023-04-18 PROCEDURE — 95713 VEEG 2-12 HR CONT MNTR: CPT | Performed by: PSYCHIATRY & NEUROLOGY

## 2023-04-18 PROCEDURE — 95718 EEG PHYS/QHP 2-12 HR W/VEEG: CPT | Performed by: PSYCHIATRY & NEUROLOGY

## 2023-05-16 ENCOUNTER — OFFICE VISIT (OUTPATIENT)
Dept: NEUROLOGY | Facility: CLINIC | Age: 58
End: 2023-05-16
Payer: MEDICARE

## 2023-05-16 VITALS — HEART RATE: 75 BPM | OXYGEN SATURATION: 97 % | DIASTOLIC BLOOD PRESSURE: 82 MMHG | SYSTOLIC BLOOD PRESSURE: 127 MMHG

## 2023-05-16 DIAGNOSIS — G40.209 PARTIAL SYMPTOMATIC EPILEPSY WITH COMPLEX PARTIAL SEIZURES, NOT INTRACTABLE, WITHOUT STATUS EPILEPTICUS (H): Primary | ICD-10-CM

## 2023-05-16 DIAGNOSIS — R41.3 MEMORY LOSS: ICD-10-CM

## 2023-05-16 PROCEDURE — 99215 OFFICE O/P EST HI 40 MIN: CPT | Mod: GC | Performed by: PSYCHIATRY & NEUROLOGY

## 2023-05-16 RX ORDER — OXCARBAZEPINE 300 MG/1
TABLET, FILM COATED ORAL
Qty: 450 TABLET | Refills: 3 | Status: SHIPPED | OUTPATIENT
Start: 2023-05-16 | End: 2023-09-19

## 2023-05-16 ASSESSMENT — PAIN SCALES - GENERAL: PAINLEVEL: MILD PAIN (3)

## 2023-05-16 NOTE — LETTER
5/16/2023       RE: Roger Fallon  [address]  [place] [address]       Dear Colleague,    Thank you for referring your patient, Roger Fallon, to the [facility] [place] [facility]. Please see a copy of my visit note below.      [facility]:  NEW PATIENT EVALUATION          Service Date: 5/16/2023     HISTORY:  Ms. Tracey Fallon is a 58-year-old, right-handed woman who was referred for a second opinion concerning a diagnosis of partial epilepsy.  Extensive outside medical records from Neurology clinics and other locations in [place] through [facility], and records from [facility] have been previously reviewed.    She was last seen on 2/6/2023, at which time she was recommended to continue on Oxcarbazepine 600mg/900mg daily as well as obtain a repeat Brain MRI and 3hr EEG. 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      As to her seizures, she reports that she has had no events since her last visit. Her last non-convulsive event occurred after increasing her dose of Oxcarbazepine in 2008 while her last convulsive GTC seizure was in 2006.     The patient confirms compliance with her medications. Her only potential side-effect is that she is concerned her memory is not what it once was, staring about 1yr ago. She will often lose points she wants to make in conversation and has been relying more on calendars and lists. Sometimes she will forget parts of conversations from a few days ago and her son has noticed that she will repeat questions at times. She also has been more difficulty with driving directions and has almost gotten lost at times.  "She doesn't lose items around her house but does feel she has difficulty learning new things. She is able to perform all ADLs, including driving, managing her medications, and paying bills on time. She prefers not to use auto-pay and is able to remember the schedule to get her bills paid. She denies any delusions or hallucinations    She reports her mood is usually ok until she watches the news. She does meet with a therapist regularly. Her sleep has been poor, usually only able to get 4hrs. She has been diagnosed with TU in the past and is currently waiting to get a replacement for her CPAP machine from a past recall. She has an upcoming appointment to get this done     Ictal semiology-history:   The patient reported that she had 2 types of seizures in the past, as convulsive and nonconvulsive seizures, which have not recurred for many years.  She wonders whether she needs to continue on antiseizure medication.     The first type of seizure is a nonconvulsive event that has been diagnosed as a complex partial seizure.  These began approximately in 1998, and occurred more than 10 times per year for at least several years, before ceasing with increased dosing of Oxcarbazepine in 2008.  She had a stereotyped typical aura consisting of an illusion of feeling \"heavy\" and a peculiar mental feeling she could not further describe, with slight bilateral hand tremors, progressing within less than a minute to full loss of consciousness, after which she felt tired and forgetful for many minutes.  These were often witnessed by others, who noted that she became very quiet and had unresponsive staring, sometimes with chewing movements for a minute or two.      The convulsive seizures were diagnosed as generalized tonic-clonic seizures.  These also began approximately in 1998, continuing at 3 per year or less until they ceased altogether in approximately 2006.  These began with the same warning aura as the complex partial " seizures, or with no warning at all before sudden loss of consciousness.  She would awaken with generalized aching, confusion lasting hours, lethargy and often a bitten tongue, but rarely evidence of urinary incontinence.  Observers told her that she would fall down if standing, have stiff extension and then generalized jerking for a minute or so.  She does not think that she ever had more than 1 grand mal seizure on the same day.     The patient denied that she has ever had a non-convulsive falling with unconsciousness, repetitive involuntary movements or posturing, sudden brief confusion, or other paroxysmal phenomena.  She denied any history of sudden involuntary jerks while fully awake, but she has had hypnic jerks.       The patient does not recognize exacerbating or remitting factors with regard to seizure frequency.      Epilepsy-seizure predispositions:   The patient has a family history that includes probable complex partial seizures of adult onset in her paternal grandfather.  Two paternal cousins also had epilepsy requiring medication treatment in adulthood, but she does not know anything about the types of seizures they had.     She has no history of gestational or  injury, febrile convulsions, developmental delay, stroke, meningitis, encephalitis, significant head injury, or other epileptic predispositions.  She denied a history of physical or sexual abuse by an adult during her childhood or adulthood.       Laboratory evaluations:   The patient has had a number of brain MRIs that showed a pituitary cyst, but no brain abnormalities, most recently performed in the [facility] system on 2019.     The patient has had electroencephalograms in the past, but she does not know the results of these studies. Her most recent on 2023 notes generalized delta-theta slowing in waking, left frontotemporal delta slowing, and left frontotemporal spikes suggestive of a mild-moderate electrographic  encephalopathy most consistent with the interictal state of temporal lobe epilepsy.      PAST MEDICAL-SURGICAL HISTORY:    1.  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  2.  History of obstructive sleep apnea.  3.  Probable cystic pituitary adenoma.  4.  Opioid dependence, with chronic methadone therapy.  5.  Osteoarthritis with chronic multifocal pain.     FAMILY HISTORY:  Family history of seizures is as noted above.  There is no family history of other neurological conditions.       PERSONAL AND SOCIAL HISTORY:  [place] the [place], and in adulthood she has lived for prolonged periods in [place], back in [place], and most recently in [place] again.  She graduated from high school.  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  She lives with her daughter and granddaughter.  She reported that she used prescription opioids for chronic pain, but became dependent, and also occasionally used marijuana, but no other illicit recreational drugs.  She completed a chemical dependency treatment program and is on chronic methadone maintenance.  She does not use ethanol.     REVIEW OF SYSTEMS:  The patient has had some difficulties with depression and anxiety, but she denied suicidal ideation today or in the past.  She denied history of attention and memory disturbance, difficulties with comprehension and expression, difficulty in solving problems, weakness, tremors or other abnormal involuntary movements, numbness or tingling, incoordination, falling or difficulty in walking, urinary or fecal incontinence, headache and other pain, except as described above.  She denied rashes and easy bruising.  The remainder of a 14-system symptom review was negative except as noted above.        MEDICATIONS:  Oxcarbazepine at 600/900 mg daily, and other medications as per the electronic medical record.      The patient initially was treated with Divalproex, but this was stopped due to inadequate seizure control.  She then was treated with Gabapentin, Tiagabine, Zonisamide and finally Oxcarbazepine.  She had adverse effects with some of these medications, but the one she remembers best was leg swelling while on Zonisamide.  She is currently on Oxcarbazepine, with no reported adverse effects.  On the current dose, a blood level was 7 mcg/mL on 12/05/2022.     PHYSICAL EXAMINATION:    On examination the patient was an alert woman in no distress while seated, but with obvious distress when standing or walking, which she attributed to bilateral knee pain.  Pulse 73, blood pressure 118/78, respirations 16 per minute.  Skull was normocephalic and atraumatic.  Neck was supple, without signs of meningeal irritation.       On neurological examination, the patient appeared alert and was fully oriented to person, place, time, and reason for visit.  Speech showed normal articulation, fluency, repetitions, naming, syntax and comprehension.  She accurately repeated digits sequences, repeating 8 forward and 5 reversed.  At 10 minutes from presentation, 4 of 4 phrases were recalled.  No apraxias or atavistic signs were elicited.  Cranial nerves III through XII were normal.  Muscle masses, tones and strengths were normal throughout.  There was no pronator drift.  Sequential fine finger movements were performed normally with each hand.  No spontaneous tremors, myoclonus, or other abnormal movements were observed.  Sensations of light touch, pin prick, vibration and proprioception were reportedly normal throughout.  The rapid alternating movements, and finger-nose-finger and heel-shin maneuvers were performed normally bilaterally.  Romberg maneuver was negative.  Deep tendon reflexes were normal and symmetric throughout.  Toes were downgoing bilaterally.  She demonstrated an antalgic gait and was not able to tandem walk due to  exacerbation of bilateral knee pain.     IMPRESSION:    The patient has a history of focal epilepsy with well-controlled seizures, which previously consisted of focal impaired and secondarily generalized tonic-clonic seizures. Brain MRI scans have not shown any likely causative cerebral lesions, although an apparent cystic adenoma of the pituitary has been followed intermittently. She is quite concerned about this and asked me to order a follow-up brain MRI, now some 4 years after the most recent MRI, for this purpose. This was ordered at last visit, but has yet to be completed     The patient is doing well in seizure control, with no evidence of Oxcarbazepine adverse effects. Repeat EEG (4/18/2023) remains concerning for temporal lobe epilepsy with left frontotemporal delta slowing and left frontotemporal spikes. As such, I recommend that she continue on her current medication due to ongoing seizure risk    She affirms compliance with her medications with her only potential side-effect being a poor memory. She is interested in looking into this further as members of her family have begun making comments. While the patient does have a number of risk factors that could be contributing such as epilepsy, vascular risk factors, medication side-effects, poor sleep while not on CPAP, mood issues, chronic pain, etc I still think further evaluation is warranted. Will start with the aforementioned MRI Brain as well as basic labs and Neuropsych testing    The patient reported that she is under evaluation for possible bilateral total knee arthroplasties with an orthopedic surgeon.     The patient indicated that she has continued successfully in a Methadone maintenance program, with no concerns over other substance use.  She has several sources of stress in her life, but she feels that she is coping well with it and she denied thoughts of death or suicide.     The patient is currently driving     PLAN:    1.  Continue  Oxcarbazepine at 600/900 mg daily.  2.  Brain 3 Svitlana MRI as previously ordered.  3.  TSH, Vit B12.  4.  Neuropsychology evaluation with Dr Scott Manrique.  5.  Return visit in approximately 4 months.    This patient was discussed with Dr Lim who agrees with the above plan    Heidi Almonte MD  Neurology PGY3     Report Prepared By: Heidi Almonte MD, Neurology Resident   I agree with the findings and plan of care as documented.  I personally examined the patient, and discussed our epilepsy diagnostic impressions and therapeutic recommendations with the patient.  The patient was agreeable to this plan.    I spent 44 minutes in this patient care, with 32 minutes in direct patient contact, and 12 minutes in chart review.         Again, thank you for allowing me to participate in the care of your patient.      Sincerely,    Colten Lim MD

## 2023-05-16 NOTE — PROGRESS NOTES
[facility]:  NEW PATIENT EVALUATION          Service Date: 5/16/2023     HISTORY:  Ms. Tracey Fallon is a 58-year-old, right-handed woman who was referred for a second opinion concerning a diagnosis of partial epilepsy.  Extensive outside medical records from Neurology clinics and other locations in [place] through [facility], and records from [facility] have been previously reviewed.    She was last seen on 2/6/2023, at which time she was recommended to continue on Oxcarbazepine 600mg/900mg daily as well as obtain a repeat Brain MRI and 3hr EEG. 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      As to her seizures, she reports that she has had no events since her last visit. Her last non-convulsive event occurred after increasing her dose of Oxcarbazepine in 2008 while her last convulsive GTC seizure was in 2006.     The patient confirms compliance with her medications. Her only potential side-effect is that she is concerned her memory is not what it once was, staring about 1yr ago. She will often lose points she wants to make in conversation and has been relying more on calendars and lists. Sometimes she will forget parts of conversations from a few days ago and her son has noticed that she will repeat questions at times. She also has been more difficulty with driving directions and has almost gotten lost at times. She doesn't lose items around her house but does feel she has difficulty learning new things. She is able to perform all ADLs, including driving, managing her medications, and paying bills on time. She prefers not to use auto-pay and is able to remember the schedule to get her bills paid. She denies any delusions or  "hallucinations    She reports her mood is usually ok until she watches the news. She does meet with a therapist regularly. Her sleep has been poor, usually only able to get 4hrs. She has been diagnosed with TU in the past and is currently waiting to get a replacement for her CPAP machine from a past recall. She has an upcoming appointment to get this done     Ictal semiology-history:   The patient reported that she had 2 types of seizures in the past, as convulsive and nonconvulsive seizures, which have not recurred for many years.  She wonders whether she needs to continue on antiseizure medication.     The first type of seizure is a nonconvulsive event that has been diagnosed as a complex partial seizure.  These began approximately in 1998, and occurred more than 10 times per year for at least several years, before ceasing with increased dosing of Oxcarbazepine in 2008.  She had a stereotyped typical aura consisting of an illusion of feeling \"heavy\" and a peculiar mental feeling she could not further describe, with slight bilateral hand tremors, progressing within less than a minute to full loss of consciousness, after which she felt tired and forgetful for many minutes.  These were often witnessed by others, who noted that she became very quiet and had unresponsive staring, sometimes with chewing movements for a minute or two.      The convulsive seizures were diagnosed as generalized tonic-clonic seizures.  These also began approximately in 1998, continuing at 3 per year or less until they ceased altogether in approximately 2006.  These began with the same warning aura as the complex partial seizures, or with no warning at all before sudden loss of consciousness.  She would awaken with generalized aching, confusion lasting hours, lethargy and often a bitten tongue, but rarely evidence of urinary incontinence.  Observers told her that she would fall down if standing, have stiff extension and then generalized " jerking for a minute or so.  She does not think that she ever had more than 1 grand mal seizure on the same day.     The patient denied that she has ever had a non-convulsive falling with unconsciousness, repetitive involuntary movements or posturing, sudden brief confusion, or other paroxysmal phenomena.  She denied any history of sudden involuntary jerks while fully awake, but she has had hypnic jerks.       The patient does not recognize exacerbating or remitting factors with regard to seizure frequency.      Epilepsy-seizure predispositions:   The patient has a family history that includes probable complex partial seizures of adult onset in her paternal grandfather.  Two paternal cousins also had epilepsy requiring medication treatment in adulthood, but she does not know anything about the types of seizures they had.     She has no history of gestational or  injury, febrile convulsions, developmental delay, stroke, meningitis, encephalitis, significant head injury, or other epileptic predispositions.  She denied a history of physical or sexual abuse by an adult during her childhood or adulthood.       Laboratory evaluations:   The patient has had a number of brain MRIs that showed a pituitary cyst, but no brain abnormalities, most recently performed in the [facility] system on 2019.     The patient has had electroencephalograms in the past, but she does not know the results of these studies. 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      PAST MEDICAL-SURGICAL HISTORY:    1.  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  2.  History of obstructive sleep apnea.  3.  Probable cystic pituitary adenoma.  4.  Opioid  dependence, with chronic methadone therapy.  5.  Osteoarthritis with chronic multifocal pain.     FAMILY HISTORY:  Family history of seizures is as noted above.  There is no family history of other neurological conditions.       PERSONAL AND SOCIAL HISTORY:  The patient grew up in the [place], and in adulthood she has lived for prolonged periods in [place], back in [place], and most recently in [place] again.  She graduated from high school.  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  She lives with her daughter and granddaughter.  She reported that she used prescription opioids for chronic pain, but became dependent, and also occasionally used marijuana, but no other illicit recreational drugs.  She completed a chemical dependency treatment program and is on chronic methadone maintenance.  She does not use ethanol.     REVIEW OF SYSTEMS:  The patient has had some difficulties with depression and anxiety, but she denied suicidal ideation today or in the past.  She denied history of attention and memory disturbance, difficulties with comprehension and expression, difficulty in solving problems, weakness, tremors or other abnormal involuntary movements, numbness or tingling, incoordination, falling or difficulty in walking, urinary or fecal incontinence, headache and other pain, except as described above.  She denied rashes and easy bruising.  The remainder of a 14-system symptom review was negative except as noted above.        MEDICATIONS:  Oxcarbazepine at 600/900 mg daily, and other medications as per the electronic medical record.     The patient initially was treated with Divalproex, but this was stopped due to inadequate seizure control.  She then was treated with Gabapentin, Tiagabine, Zonisamide and finally Oxcarbazepine.  She had adverse effects with some of these medications, but the one she remembers best was leg swelling while on  Zonisamide.  She is currently on Oxcarbazepine, with no reported adverse effects.  On the current dose, a blood level was 7 mcg/mL on 12/05/2022.     PHYSICAL EXAMINATION:    On examination the patient was an alert woman in no distress while seated, but with obvious distress when standing or walking, which she attributed to bilateral knee pain.  Pulse 73, blood pressure 118/78, respirations 16 per minute.  Skull was normocephalic and atraumatic.  Neck was supple, without signs of meningeal irritation.       On neurological examination, the patient appeared alert and was fully oriented to person, place, time, and reason for visit.  Speech showed normal articulation, fluency, repetitions, naming, syntax and comprehension.  She accurately repeated digits sequences, repeating 8 forward and 5 reversed.  At 10 minutes from presentation, 4 of 4 phrases were recalled.  No apraxias or atavistic signs were elicited.  Cranial nerves III through XII were normal.  Muscle masses, tones and strengths were normal throughout.  There was no pronator drift.  Sequential fine finger movements were performed normally with each hand.  No spontaneous tremors, myoclonus, or other abnormal movements were observed.  Sensations of light touch, pin prick, vibration and proprioception were reportedly normal throughout.  The rapid alternating movements, and finger-nose-finger and heel-shin maneuvers were performed normally bilaterally.  Romberg maneuver was negative.  Deep tendon reflexes were normal and symmetric throughout.  Toes were downgoing bilaterally.  She demonstrated an antalgic gait and was not able to tandem walk due to exacerbation of bilateral knee pain.     IMPRESSION:    The patient has a history of focal epilepsy with well-controlled seizures, which previously consisted of focal impaired and secondarily generalized tonic-clonic seizures. Brain MRI scans have not shown any likely causative cerebral lesions, although an apparent  cystic adenoma of the pituitary has been followed intermittently. She is quite concerned about this and asked me to order a follow-up brain MRI, now some 4 years after the most recent MRI, for this purpose. This was ordered at last visit, but has yet to be completed     The patient is doing well in seizure control, with no evidence of Oxcarbazepine adverse effects. Repeat EEG (4/18/2023) remains concerning for temporal lobe epilepsy with left frontotemporal delta slowing and left frontotemporal spikes. As such, I recommend that she continue on her current medication due to ongoing seizure risk    She affirms compliance with her medications with her only potential side-effect being a poor memory. She is interested in looking into this further as members of her family have begun making comments. While the patient does have a number of risk factors that could be contributing such as epilepsy, vascular risk factors, medication side-effects, poor sleep while not on CPAP, mood issues, chronic pain, etc I still think further evaluation is warranted. Will start with the aforementioned MRI Brain as well as basic labs and Neuropsych testing    The patient reported that she is under evaluation for possible bilateral total knee arthroplasties with an orthopedic surgeon.     The patient indicated that she has continued successfully in a Methadone maintenance program, with no concerns over other substance use.  She has several sources of stress in her life, but she feels that she is coping well with it and she denied thoughts of death or suicide.     The patient is currently driving     PLAN:    1.  Continue Oxcarbazepine at 600/900 mg daily.  2.  Brain 3 Svitlana MRI as previously ordered.  3.  TSH, Vit B12.  4.  Neuropsychology evaluation with Dr Scott Manrique.  5.  Return visit in approximately 4 months.    This patient was discussed with Dr Lim who agrees with the above plan    Heidi Almonte MD  Neurology PGY3     Report  Prepared By: Heidi Almonte MD, Neurology Resident   I agree with the findings and plan of care as documented.  I personally examined the patient, and discussed our epilepsy diagnostic impressions and therapeutic recommendations with the patient.  The patient was agreeable to this plan.    I spent 44 minutes in this patient care, with 32 minutes in direct patient contact, and 12 minutes in chart review.   Colten Lim M.D., Professor of Neurology

## 2023-09-19 ENCOUNTER — OFFICE VISIT (OUTPATIENT)
Dept: NEUROLOGY | Facility: CLINIC | Age: 58
End: 2023-09-19
Payer: MEDICARE

## 2023-09-19 VITALS
SYSTOLIC BLOOD PRESSURE: 120 MMHG | DIASTOLIC BLOOD PRESSURE: 78 MMHG | OXYGEN SATURATION: 96 % | RESPIRATION RATE: 16 BRPM | HEART RATE: 69 BPM

## 2023-09-19 DIAGNOSIS — G40.209 PARTIAL SYMPTOMATIC EPILEPSY WITH COMPLEX PARTIAL SEIZURES, NOT INTRACTABLE, WITHOUT STATUS EPILEPTICUS (H): ICD-10-CM

## 2023-09-19 PROCEDURE — 99215 OFFICE O/P EST HI 40 MIN: CPT | Performed by: PSYCHIATRY & NEUROLOGY

## 2023-09-19 RX ORDER — OXCARBAZEPINE 300 MG/1
TABLET, FILM COATED ORAL
Qty: 450 TABLET | Refills: 3 | Status: SHIPPED | OUTPATIENT
Start: 2023-09-19 | End: 2024-05-07

## 2023-09-19 RX ORDER — CHLORTHALIDONE 25 MG/1
TABLET ORAL
COMMUNITY

## 2023-09-19 ASSESSMENT — PAIN SCALES - GENERAL: PAINLEVEL: MODERATE PAIN (5)

## 2023-09-19 NOTE — LETTER
9/19/2023       RE: Roger Fallon  [address]  [place] [address]         Dear Colleague,    Thank you for referring your patient, Roger Fallon, to the [facility]. Please see a copy of my visit note below.      [facility]:  RETURN VISIT          Service Date: 09/19/2023     I spent 45 minutes in this patient care, with 30 minutes in direct patient contact, and 15 minutes in chart review and document preparation on the day of the visit.         Again, thank you for allowing me to participate in the care of your patient.      Sincerely,    Colten Lim MD

## 2023-09-20 NOTE — PROGRESS NOTES
[facility]:  RETRUN VISIT          Service Date: 09/19/2023     HISTORY:  Ms. Tracey Fallon is a 58-year-old, right-handed woman who was referred for a second opinion concerning a diagnosis of partial epilepsy.  Extensive outside medical records from Neurology clinics and other locations in [place] through [facility], and records from [facility] have been previously reviewed.    She was last seen on 05/16/2023, she has had no seizures and no adverse effects of oxcarbazepine.  .      Her most recent non-convulsive event occurred after increasing her dose of Oxcarbazepine in 2008, while her last convulsive GTC seizure was in 2006.     The patient confirms compliance with her medications. Her only potential side-effect is that she is concerned her memory is not what it once was, staring about 1yr ago. She will often lose points she wants to make in conversation and has been relying more on calendars and lists. Sometimes she will forget parts of conversations from a few days ago and her son has noticed that she will repeat questions at times. She also has been more difficulty with driving directions and has almost gotten lost at times. She doesn't lose items around her house but does feel she has difficulty learning new things. She is able to perform all ADLs, including driving, managing her medications, and paying bills on time. She prefers not to use auto-pay and is able to remember the schedule to get her bills paid. She denies any delusions or hallucinations    She reports her mood is usually ok until she watches the news. She does meet with a therapist regularly. Her sleep has been poor, usually only able to get 4hrs. She has been diagnosed with TU in the past and is currently waiting to get a replacement for her CPAP machine from a past recall. She has an upcoming appointment to get this done     Ictal semiology-history:   The patient reported that she had 2 types of seizures  "in the past, as convulsive and nonconvulsive seizures, which have not recurred for many years.  She wonders whether she needs to continue on antiseizure medication.     The first type of seizure is a nonconvulsive event that has been diagnosed as a complex partial seizure.  These began approximately in 1998, and occurred more than 10 times per year for at least several years, before ceasing with increased dosing of Oxcarbazepine in 2008.  She had a stereotyped typical aura consisting of an illusion of feeling \"heavy\" and a peculiar mental feeling she could not further describe, with slight bilateral hand tremors, progressing within less than a minute to full loss of consciousness, after which she felt tired and forgetful for many minutes.  These were often witnessed by others, who noted that she became very quiet and had unresponsive staring, sometimes with chewing movements for a minute or two.      The convulsive seizures were diagnosed as generalized tonic-clonic seizures.  These also began approximately in 1998, continuing at 3 per year or less until they ceased altogether in approximately 2006.  These began with the same warning aura as the complex partial seizures, or with no warning at all before sudden loss of consciousness.  She would awaken with generalized aching, confusion lasting hours, lethargy and often a bitten tongue, but rarely evidence of urinary incontinence.  Observers told her that she would fall down if standing, have stiff extension and then generalized jerking for a minute or so.  She does not think that she ever had more than 1 grand mal seizure on the same day.     The patient denied that she has ever had a non-convulsive falling with unconsciousness, repetitive involuntary movements or posturing, sudden brief confusion, or other paroxysmal phenomena.  She denied any history of sudden involuntary jerks while fully awake, but she has had hypnic jerks.       The patient does not recognize " exacerbating or remitting factors with regard to seizure frequency.      Epilepsy-seizure predispositions:   The patient has a family history that includes probable complex partial seizures of adult onset in her paternal grandfather.  Two paternal cousins also had epilepsy requiring medication treatment in adulthood, but she does not know anything about the types of seizures they had.     She has no history of gestational or  injury, febrile convulsions, developmental delay, stroke, meningitis, encephalitis, significant head injury, or other epileptic predispositions.  She denied a history of physical or sexual abuse by an adult during her childhood or adulthood.       Laboratory evaluations:   The patient has had a number of brain MRIs that showed a pituitary cyst, but no brain abnormalities, most recently performed in the [employer] system on 2019.     The patient has had electroencephalograms in the past, but she does not know the results of these studies. 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     Epilepsy therapeutics:   The patient initially was treated with divalproex, but this was stopped due to inadequate seizure control.  She then was treated with gabapentin, tiagabine, zonisamide and finally oxcarbazepine.  She had adverse effects with some of these medications, but the one she remembers best was leg swelling while on Zonisamide.  She is currently on oxcarbazepine, with no reported adverse effects.  On the current dose, a blood level was 7 mcg/mL on 2022.    PAST MEDICAL-SURGICAL HISTORY:    1.  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  2.  History of obstructive sleep apnea.  3.  Probable cystic  pituitary adenoma.  4.  Opioid dependence, with chronic methadone therapy.  5.  Osteoarthritis with chronic multifocal pain.     FAMILY HISTORY:  Family history of seizures is as noted above.  There is no family history of other neurological conditions.       PERSONAL AND SOCIAL HISTORY:  The patient grew up in the [place], and in adulthood she has lived for prolonged periods in [place], back in [place], and most recently in [place] again.  She graduated from high school.  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  She lives with her daughter and granddaughter.  She reported that she used prescription opioids for chronic pain, but became dependent, and also occasionally used marijuana, but no other illicit recreational drugs.  She completed a chemical dependency treatment program and is on chronic methadone maintenance.  She does not use ethanol.     REVIEW OF SYSTEMS:  The patient has had some difficulties with depression and anxiety, but she denied suicidal ideation today or in the past.  She denied history of attention and memory disturbance, difficulties with comprehension and expression, difficulty in solving problems, weakness, tremors or other abnormal involuntary movements, numbness or tingling, incoordination, falling or difficulty in walking, urinary or fecal incontinence, headache and other pain, except as described above.  She denied rashes and easy bruising.  The remainder of a 14-system symptom review was negative except as noted above.        MEDICATIONS:  Oxcarbazepine at 600/900 mg daily, and other medications as per the electronic medical record.     PHYSICAL EXAMINATION:    On examination the patient was an alert woman in no distress while seated, but with obvious distress when standing or walking, which she attributed to bilateral knee pain.  Pulse 73, blood pressure 118/78, respirations 16 per minute.  Skull was normocephalic and  atraumatic.  Neck was supple, without signs of meningeal irritation.       On neurological examination, the patient appeared alert and was fully oriented to person, place, time, and reason for visit.  Speech showed normal articulation, fluency, repetitions, naming, syntax and comprehension.  She accurately repeated digits sequences, repeating 8 forward and 5 reversed.  At 10 minutes from presentation, 4 of 4 phrases were recalled.  No apraxias or atavistic signs were elicited.  Cranial nerves III through XII were normal.  Muscle masses, tones and strengths were normal throughout.  There was no pronator drift.  Sequential fine finger movements were performed normally with each hand.  No spontaneous tremors, myoclonus, or other abnormal movements were observed.  Sensations of light touch, pin prick, vibration and proprioception were reportedly normal throughout.  The rapid alternating movements, and finger-nose-finger and heel-shin maneuvers were performed normally bilaterally.  Romberg maneuver was negative.  Deep tendon reflexes were normal and symmetric throughout.  Toes were downgoing bilaterally.  She demonstrated an antalgic gait and was not able to tandem walk due to exacerbation of bilateral knee pain.     IMPRESSION:    The patient has a history of focal epilepsy with well-controlled seizures, which previously consisted of focal impaired and secondarily generalized tonic-clonic seizures. Brain MRI scans have not shown any likely causative cerebral lesions, although an apparent cystic adenoma of the pituitary has been followed intermittently. She is quite concerned about this and asked me to order a follow-up brain MRI, now some 4 years after the most recent MRI, for this purpose. This was ordered at last visit, but has yet to be completed.  I spent considerable time expanding to her why it would be important to detect enlargement of a lesion in this location, before visual or other permanent deficits occur.        The patient is doing well in seizure control, with no evidence of oxcarbazepine adverse effects. Repeat EEG (4/18/2023) remains concerning for temporal lobe epilepsy with left frontotemporal delta slowing and left frontotemporal spikes. As such, I recommend that she continue on her current medication due to ongoing seizure risk    She affirms compliance with her medications with her only potential side-effect being a poor memory. She is interested in looking into this further as members of her family have begun making comments. While the patient does have a number of risk factors that could be contributing such as epilepsy, vascular risk factors, medication side-effects, poor sleep while not on CPAP, mood issues, and chronic pain.  I still think further evaluation is warranted. Will start with the aforementioned brain MRI as well as basic labs and neuropsychological testing    The patient reported that she is under evaluation for possible bilateral total knee arthroplasties with an orthopedic surgeon.     The patient indicated that she has continued successfully in a Methadone maintenance program, with no concerns over other substance use.  She has several sources of stress in her life, but she feels that she is coping well with it and she denied thoughts of death or suicide.     The patient is currently driving     PLAN:    1.  Continue oxcarbazepine at the current dose.  2.  Brain 3 Svitlana MRI, as previously ordered.  3.  Neuropsychology evaluation with Dr Scott Manrique, as previously ordered.  4.  Return visit in approximately 6 months.    I spent 44 minutes in this patient care, with 32 minutes in direct patient contact, and 12 minutes in chart review.       Colten Lim M.D.  Professor of Neurology

## 2023-10-20 ENCOUNTER — ANCILLARY PROCEDURE (OUTPATIENT)
Dept: MRI IMAGING | Facility: CLINIC | Age: 58
End: 2023-10-20
Attending: PSYCHIATRY & NEUROLOGY
Payer: MEDICARE

## 2023-10-20 DIAGNOSIS — G40.209 PARTIAL SYMPTOMATIC EPILEPSY WITH COMPLEX PARTIAL SEIZURES, NOT INTRACTABLE, WITHOUT STATUS EPILEPTICUS (H): ICD-10-CM

## 2023-10-20 PROCEDURE — G1010 CDSM STANSON: HCPCS | Mod: GC | Performed by: RADIOLOGY

## 2023-10-20 PROCEDURE — A9585 GADOBUTROL INJECTION: HCPCS | Mod: JZ | Performed by: RADIOLOGY

## 2023-10-20 PROCEDURE — 70553 MRI BRAIN STEM W/O & W/DYE: CPT | Mod: MG | Performed by: RADIOLOGY

## 2023-10-20 RX ORDER — GADOBUTROL 604.72 MG/ML
10 INJECTION INTRAVENOUS ONCE
Status: COMPLETED | OUTPATIENT
Start: 2023-10-20 | End: 2023-10-20

## 2023-10-20 RX ADMIN — GADOBUTROL 10 ML: 604.72 INJECTION INTRAVENOUS at 14:55

## 2024-05-07 ENCOUNTER — OFFICE VISIT (OUTPATIENT)
Dept: NEUROLOGY | Facility: CLINIC | Age: 59
End: 2024-05-07
Payer: MEDICARE

## 2024-05-07 ENCOUNTER — LAB (OUTPATIENT)
Dept: LAB | Facility: CLINIC | Age: 59
End: 2024-05-07
Attending: PSYCHIATRY & NEUROLOGY
Payer: MEDICARE

## 2024-05-07 VITALS
OXYGEN SATURATION: 97 % | RESPIRATION RATE: 20 BRPM | SYSTOLIC BLOOD PRESSURE: 131 MMHG | DIASTOLIC BLOOD PRESSURE: 85 MMHG | HEART RATE: 70 BPM

## 2024-05-07 DIAGNOSIS — G40.209 PARTIAL SYMPTOMATIC EPILEPSY WITH COMPLEX PARTIAL SEIZURES, NOT INTRACTABLE, WITHOUT STATUS EPILEPTICUS (H): ICD-10-CM

## 2024-05-07 LAB — SODIUM SERPL-SCNC: 140 MMOL/L (ref 135–145)

## 2024-05-07 PROCEDURE — 36415 COLL VENOUS BLD VENIPUNCTURE: CPT | Performed by: PATHOLOGY

## 2024-05-07 PROCEDURE — 80183 DRUG SCRN QUANT OXCARBAZEPIN: CPT | Mod: 90 | Performed by: PATHOLOGY

## 2024-05-07 PROCEDURE — 99214 OFFICE O/P EST MOD 30 MIN: CPT | Performed by: PSYCHIATRY & NEUROLOGY

## 2024-05-07 PROCEDURE — 99000 SPECIMEN HANDLING OFFICE-LAB: CPT | Performed by: PATHOLOGY

## 2024-05-07 PROCEDURE — 84295 ASSAY OF SERUM SODIUM: CPT | Performed by: PATHOLOGY

## 2024-05-07 RX ORDER — OXCARBAZEPINE 300 MG/1
TABLET, FILM COATED ORAL
Qty: 450 TABLET | Refills: 3 | Status: SHIPPED | OUTPATIENT
Start: 2024-05-07

## 2024-05-07 ASSESSMENT — PAIN SCALES - GENERAL: PAINLEVEL: SEVERE PAIN (7)

## 2024-05-07 NOTE — LETTER
5/7/2024       RE: Roger Fallon  [address]  [place] [address]     Dear Colleague,    Thank you for referring your patient, Roger Fallon, to the [facility] [place] [facility]. Please see a copy of my visit note below.      [facility]:  RETURN VISIT          Service Date: 09/19/2023     HISTORY:  Ms. Tracey Fallon is a 59-year-old, right-handed woman who was referred for a second opinion concerning a diagnosis of partial epilepsy.  Extensive outside medical records from Neurology clinics and other locations in [place] through [facility], and records from [facility] have been previously reviewed.     She was last seen on 09/19/2023, she has had no seizures and no adverse effects of oxcarbazepine.  .      Her most recent non-convulsive event occurred in 2008, while her last convulsive GTC seizure was in 2006.      The patient confirms compliance with her medications. Her only potential side-effect is that she is concerned her memory is not what it once was, staring about 1yr ago. She will often lose points she wants to make in conversation and has been relying more on calendars and lists. Sometimes she will forget parts of conversations from a few days ago and her son has noticed that she will repeat questions at times. She also has been more difficulty with driving directions and has almost gotten lost at times. She doesn't lose items around her house but does feel she has difficulty learning new things. She is able to perform all ADLs, including driving, managing her medications, and paying bills on time. She prefers not to use auto-pay and is able to remember the schedule to get her bills paid. She denies any delusions or hallucinations     She reports her mood is usually ok until she watches the news. She does meet with a therapist regularly. Her sleep has been poor, usually only able  "to get 4hrs. She has been diagnosed with TU in the past and is currently waiting to get a replacement for her CPAP machine from a past recall. She has an upcoming appointment to get this done     Ictal semiology-history:   The patient reported that she had 2 types of seizures in the past, as convulsive and nonconvulsive seizures, which have not recurred for many years.  She wonders whether she needs to continue on antiseizure medication.     The first type of seizure is a nonconvulsive event that has been diagnosed as a complex partial seizure.  These began approximately in 1998, and occurred more than 10 times per year for at least several years, before ceasing with increased dosing of Oxcarbazepine in 2008.  She had a stereotyped typical aura consisting of an illusion of feeling \"heavy\" and a peculiar mental feeling she could not further describe, with slight bilateral hand tremors, progressing within less than a minute to full loss of consciousness, after which she felt tired and forgetful for many minutes.  These were often witnessed by others, who noted that she became very quiet and had unresponsive staring, sometimes with chewing movements for a minute or two.      The convulsive seizures were diagnosed as generalized tonic-clonic seizures.  These also began approximately in 1998, continuing at 3 per year or less until they ceased altogether in approximately 2006.  These began with the same warning aura as the complex partial seizures, or with no warning at all before sudden loss of consciousness.  She would awaken with generalized aching, confusion lasting hours, lethargy and often a bitten tongue, but rarely evidence of urinary incontinence.  Observers told her that she would fall down if standing, have stiff extension and then generalized jerking for a minute or so.  She does not think that she ever had more than 1 grand mal seizure on the same day.     The patient denied that she has ever had a " non-convulsive falling with unconsciousness, repetitive involuntary movements or posturing, sudden brief confusion, or other paroxysmal phenomena.  She denied any history of sudden involuntary jerks while fully awake, but she has had hypnic jerks.       The patient does not recognize exacerbating or remitting factors with regard to seizure frequency.      Epilepsy-seizure predispositions:   The patient has a family history that includes probable complex partial seizures of adult onset in her paternal grandfather.  Two paternal cousins also had epilepsy requiring medication treatment in adulthood, but she does not know anything about the types of seizures they had.     She has no history of gestational or  injury, febrile convulsions, developmental delay, stroke, meningitis, encephalitis, significant head injury, or other epileptic predispositions.  She denied a history of physical or sexual abuse by an adult during her childhood or adulthood.       Laboratory evaluations:   The patient has had a number of brain MRIs that showed a pituitary cyst, but no brain abnormalities, most recently performed in the [employer] system on 2019.     The patient has had electroencephalograms in the past, but she does not know the results of these studies. 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      Epilepsy therapeutics:   The patient initially was treated with divalproex, but this was stopped due to inadequate seizure control.  She then was treated with gabapentin, tiagabine, zonisamide and finally oxcarbazepine.  She had adverse effects with some of these medications, but the one she remembers best was leg swelling while on Zonisamide.  She is currently on oxcarbazepine, with no reported  adverse effects.  On the current dose, a blood level was 7 mcg/mL on 12/05/2022.     PAST MEDICAL-SURGICAL HISTORY:    1.  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  2.  History of obstructive sleep apnea.  3.  Probable cystic pituitary adenoma.  4.  Opioid dependence, with chronic methadone therapy.  5.  Osteoarthritis with chronic multifocal pain.     FAMILY HISTORY:  Family history of seizures is as noted above.  There is no family history of other neurological conditions.       PERSONAL AND SOCIAL HISTORY:  [place], and in adulthood she has lived for prolonged periods in [place], back in [place], and most recently in [place] again.  She graduated from high school.  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  She lives with her daughter and granddaughter.  She reported that she used prescription opioids for chronic pain, but became dependent, and also occasionally used marijuana, but no other illicit recreational drugs.  She completed a chemical dependency treatment program and is on chronic methadone maintenance.  She does not use ethanol.     REVIEW OF SYSTEMS:  The patient has had some difficulties with depression and anxiety, but she denied suicidal ideation today or in the past.  She denied history of attention and memory disturbance, difficulties with comprehension and expression, difficulty in solving problems, weakness, tremors or other abnormal involuntary movements, numbness or tingling, incoordination, falling or difficulty in walking, urinary or fecal incontinence, headache and other pain, except as described above.  She denied rashes and easy bruising.  The remainder of a 14-system symptom review was negative except as noted above.        MEDICATIONS:  Oxcarbazepine at 600/900 mg daily, and other medications as per the electronic medical record.       PHYSICAL EXAMINATION:    On examination the patient was an alert woman in no distress while seated, but with obvious distress when standing or walking, which she attributed to bilateral knee pain.  Vital signs were as per the electronic medical record.  Skull was normocephalic and atraumatic.  Neck was supple, without signs of meningeal irritation.       On neurological examination, the patient appeared alert and was fully oriented to person, place, time, and reason for visit.  Speech showed normal articulation, fluency, repetitions, naming, syntax and comprehension.  She accurately repeated digits sequences, repeating 8 forward and 5 reversed.  At 10 minutes from presentation, 4 of 4 phrases were recalled.  No apraxias or atavistic signs were elicited.  Cranial nerves III through XII were normal.  Muscle masses, tones and strengths were normal throughout.  There was no pronator drift.  Sequential fine finger movements were performed normally with each hand.  No spontaneous tremors, myoclonus, or other abnormal movements were observed.  Sensations of light touch, pin prick, vibration and proprioception were reportedly normal throughout.  The rapid alternating movements, and finger-nose-finger and heel-shin maneuvers were performed normally bilaterally.  Romberg maneuver was negative.  Deep tendon reflexes were normal and symmetric throughout.  Toes were downgoing bilaterally.  She demonstrated an antalgic gait and was not able to tandem walk due to exacerbation of bilateral knee pain.     IMPRESSION:    The patient has a history of focal epilepsy with well-controlled seizures, which previously consisted of focal impaired and secondarily generalized tonic-clonic seizures. Brain MRI scans have not shown any likely causative cerebral lesions, although an apparent cystic adenoma of the pituitary has been followed intermittently. She is quite concerned about this and asked me to order a follow-up brain MRI, now some 4  years after the most recent MRI, for this purpose. This was ordered at last visit, but has yet to be completed.  I spent considerable time expanding to her why it would be important to detect enlargement of a lesion in this location, before visual or other permanent deficits occur.       The patient is doing well in seizure control, with no evidence of oxcarbazepine adverse effects. Repeat EEG (4/18/2023) remains concerning for temporal lobe epilepsy with left frontotemporal delta slowing and left frontotemporal spikes. As such, I recommend that she continue on her current medication due to ongoing seizure risk     She affirms compliance with her medications with her only potential side-effect being a poor memory. She is interested in looking into this further as members of her family have begun making comments. While the patient does have a number of risk factors that could be contributing such as epilepsy, vascular risk factors, medication side-effects, poor sleep while not on CPAP, mood issues, and chronic pain.  I still think further evaluation is warranted. Will start with the aforementioned brain MRI as well as basic labs and neuropsychological testing     The patient reported that she is under evaluation for possible bilateral total knee arthroplasties with an orthopedic surgeon.     The patient indicated that she has continued successfully in a Methadone maintenance program, with no concerns over other substance use.  She has several sources of stress in her life, but she feels that she is coping well with it and she denied thoughts of death or suicide.     The patient is currently driving     PLAN:    1.  Continue oxcarbazepine at the current dose.  2.  Brain 3 Svitlana MRI, as previously ordered.  3.  Neuropsychology evaluation with Dr Scott Manrique, as previously ordered.  4.  Return visit in approximately 6 months.     I spent 34 minutes in this patient care, with 22 minutes in direct patient contact, and 12  minutes in chart review.            Again, thank you for allowing me to participate in the care of your patient.      Sincerely,    Colten Lim MD

## 2024-05-07 NOTE — NURSING NOTE
Chief Complaint   Patient presents with    RECHECK     /85 (BP Location: Right arm, Patient Position: Sitting, Cuff Size: Adult Large)   Pulse 70   Resp 20   SpO2 97%     TIMOTHY ANGELES

## 2024-05-08 NOTE — PROGRESS NOTES
[facility]:  RETURN VISIT          Service Date: 05/07/2024     HISTORY:  Ms. Trcaey Fallon is a 59-year-old, right-handed woman who was referred for a second opinion concerning a diagnosis of partial epilepsy.  Extensive outside medical records from Neurology clinics and other locations in [place] through [facility], and records from [facility] have been previously reviewed.     She was last seen on 09/19/2023, she has had no seizures and no adverse effects of oxcarbazepine.  .      Her most recent non-convulsive event occurred in 2008, while her last convulsive GTC seizure was in 2006.      The patient confirms compliance with her medications. Her only potential side-effect is that she is concerned her memory is not what it once was, staring about 1yr ago. She will often lose points she wants to make in conversation and has been relying more on calendars and lists. Sometimes she will forget parts of conversations from a few days ago and her son has noticed that she will repeat questions at times. She also has been more difficulty with driving directions and has almost gotten lost at times. She doesn't lose items around her house but does feel she has difficulty learning new things. She is able to perform all ADLs, including driving, managing her medications, and paying bills on time. She prefers not to use auto-pay and is able to remember the schedule to get her bills paid. She denies any delusions or hallucinations     She reports her mood is usually ok until she watches the news. She does meet with a therapist regularly. Her sleep has been poor, usually only able to get 4hrs. She has been diagnosed with TU in the past and is currently waiting to get a replacement for her CPAP machine from a past recall. She has an upcoming appointment to get this done     Ictal semiology-history:   The patient reported that she had 2 types of seizures in the past, as convulsive and  "nonconvulsive seizures, which have not recurred for many years.  She wonders whether she needs to continue on antiseizure medication.     The first type of seizure is a nonconvulsive event that has been diagnosed as a complex partial seizure.  These began approximately in 1998, and occurred more than 10 times per year for at least several years, before ceasing with increased dosing of Oxcarbazepine in 2008.  She had a stereotyped typical aura consisting of an illusion of feeling \"heavy\" and a peculiar mental feeling she could not further describe, with slight bilateral hand tremors, progressing within less than a minute to full loss of consciousness, after which she felt tired and forgetful for many minutes.  These were often witnessed by others, who noted that she became very quiet and had unresponsive staring, sometimes with chewing movements for a minute or two.      The convulsive seizures were diagnosed as generalized tonic-clonic seizures.  These also began approximately in 1998, continuing at 3 per year or less until they ceased altogether in approximately 2006.  These began with the same warning aura as the complex partial seizures, or with no warning at all before sudden loss of consciousness.  She would awaken with generalized aching, confusion lasting hours, lethargy and often a bitten tongue, but rarely evidence of urinary incontinence.  Observers told her that she would fall down if standing, have stiff extension and then generalized jerking for a minute or so.  She does not think that she ever had more than 1 grand mal seizure on the same day.     The patient denied that she has ever had a non-convulsive falling with unconsciousness, repetitive involuntary movements or posturing, sudden brief confusion, or other paroxysmal phenomena.  She denied any history of sudden involuntary jerks while fully awake, but she has had hypnic jerks.       The patient does not recognize exacerbating or remitting factors " with regard to seizure frequency.      Epilepsy-seizure predispositions:   The patient has a family history that includes probable complex partial seizures of adult onset in her paternal grandfather.  Two paternal cousins also had epilepsy requiring medication treatment in adulthood, but she does not know anything about the types of seizures they had.     She has no history of gestational or  injury, febrile convulsions, developmental delay, stroke, meningitis, encephalitis, significant head injury, or other epileptic predispositions.  She denied a history of physical or sexual abuse by an adult during her childhood or adulthood.       Laboratory evaluations:   The patient has had a number of brain MRIs that showed a pituitary cyst, but no brain abnormalities, most recently performed in the [employer] system on 2019.     The patient has had electroencephalograms in the past, but she does not know the results of these studies. 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      Epilepsy therapeutics:   The patient initially was treated with divalproex, but this was stopped due to inadequate seizure control.  She then was treated with gabapentin, tiagabine, zonisamide and finally oxcarbazepine.  She had adverse effects with some of these medications, but the one she remembers best was leg swelling while on Zonisamide.  She is currently on oxcarbazepine, with no reported adverse effects.  On the current dose, a blood level was 7 mcg/mL on 2022.     PAST MEDICAL-SURGICAL HISTORY:    1.  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  2.  History of obstructive sleep apnea.  3.  Probable cystic pituitary adenoma.  4.  Opioid  dependence, with chronic methadone therapy.  5.  Osteoarthritis with chronic multifocal pain.     FAMILY HISTORY:  Family history of seizures is as noted above.  There is no family history of other neurological conditions.       PERSONAL AND SOCIAL HISTORY:  The patient grew up in the [place], and in adulthood she has lived for prolonged periods in [place], back in [place], and most recently in [place] again.  She graduated from high school.  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  She lives with her daughter and granddaughter.  She reported that she used prescription opioids for chronic pain, but became dependent, and also occasionally used marijuana, but no other illicit recreational drugs.  She completed a chemical dependency treatment program and is on chronic methadone maintenance.  She does not use ethanol.     REVIEW OF SYSTEMS:  The patient has had some difficulties with depression and anxiety, but she denied suicidal ideation today or in the past.  She denied history of attention and memory disturbance, difficulties with comprehension and expression, difficulty in solving problems, weakness, tremors or other abnormal involuntary movements, numbness or tingling, incoordination, falling or difficulty in walking, urinary or fecal incontinence, headache and other pain, except as described above.  She denied rashes and easy bruising.  The remainder of a 14-system symptom review was negative except as noted above.        MEDICATIONS:  Oxcarbazepine at 600/900 mg daily, and other medications as per the electronic medical record.      PHYSICAL EXAMINATION:    On examination the patient was an alert woman in no distress while seated, but with obvious distress when standing or walking, which she attributed to bilateral knee pain.  Vital signs were as per the electronic medical record.  Skull was normocephalic and atraumatic.  Neck was supple,  without signs of meningeal irritation.       On neurological examination, the patient appeared alert and was fully oriented to person, place, time, and reason for visit.  Speech showed normal articulation, fluency, repetitions, naming, syntax and comprehension.  She accurately repeated digits sequences, repeating 8 forward and 5 reversed.  At 10 minutes from presentation, 4 of 4 phrases were recalled.  No apraxias or atavistic signs were elicited.  Cranial nerves III through XII were normal.  Muscle masses, tones and strengths were normal throughout.  There was no pronator drift.  Sequential fine finger movements were performed normally with each hand.  No spontaneous tremors, myoclonus, or other abnormal movements were observed.  Sensations of light touch, pin prick, vibration and proprioception were reportedly normal throughout.  The rapid alternating movements, and finger-nose-finger and heel-shin maneuvers were performed normally bilaterally.  Romberg maneuver was negative.  Deep tendon reflexes were normal and symmetric throughout.  Toes were downgoing bilaterally.  She demonstrated an antalgic gait and was not able to tandem walk due to exacerbation of bilateral knee pain.     IMPRESSION:    The patient has a history of focal epilepsy with well-controlled seizures, which previously consisted of focal impaired and secondarily generalized tonic-clonic seizures. Brain MRI scans have not shown any likely causative cerebral lesions, although an apparent cystic adenoma of the pituitary has been followed intermittently. She is quite concerned about this and asked me to order a follow-up brain MRI, now some 4 years after the most recent MRI, for this purpose. This was ordered at last visit, but has yet to be completed.  I spent considerable time expanding to her why it would be important to detect enlargement of a lesion in this location, before visual or other permanent deficits occur.       The patient is doing well  in seizure control, with no evidence of oxcarbazepine adverse effects. Repeat EEG (4/18/2023) remains concerning for temporal lobe epilepsy with left frontotemporal delta slowing and left frontotemporal spikes. As such, I recommend that she continue on her current medication due to ongoing seizure risk     She affirms compliance with her medications with her only potential side-effect being a poor memory. She is interested in looking into this further as members of her family have begun making comments. While the patient does have a number of risk factors that could be contributing such as epilepsy, vascular risk factors, medication side-effects, poor sleep while not on CPAP, mood issues, and chronic pain.  I still think further evaluation is warranted. Will start with the aforementioned brain MRI as well as basic labs and neuropsychological testing     The patient reported that she is under evaluation for possible bilateral total knee arthroplasties with an orthopedic surgeon.     The patient indicated that she has continued successfully in a Methadone maintenance program, with no concerns over other substance use.  She has several sources of stress in her life, but she feels that she is coping well with it and she denied thoughts of death or suicide.     The patient is currently driving     PLAN:    1.  Continue oxcarbazepine at the current dose.  2.  Brain 3 Svitlana MRI, as previously ordered.  3.  Neuropsychology evaluation with Dr Scott Manrique, as previously ordered.  4.  Return visit in approximately 6 months.     I spent 34 minutes in this patient care, with 22 minutes in direct patient contact, and 12 minutes in chart review.         Colten Lim M.D.  Professor of Neurology

## 2024-05-09 LAB — 10OH-CARBAZEPINE SERPL-MCNC: 11 UG/ML

## 2024-07-10 ENCOUNTER — TELEPHONE (OUTPATIENT)
Dept: NEUROLOGY | Facility: CLINIC | Age: 59
End: 2024-07-10
Payer: MEDICARE

## 2024-07-10 NOTE — TELEPHONE ENCOUNTER
Patient confirmed scheduled appointment:  Date: 3/24/25  Time: 1:30PM  Visit type: New Seizure  Provider: Tera  [place] [facility] [place]  Testing/imaging: NA  Additional notes: Pt establishing care after seeing Dr Raphael Willett on 7/10/2024 at 10:58 AM

## 2025-05-20 ENCOUNTER — RX ONLY (RX ONLY)
Age: 60
End: 2025-05-20

## 2025-05-20 ENCOUNTER — APPOINTMENT (OUTPATIENT)
Dept: URBAN - METROPOLITAN AREA CLINIC 260 | Age: 60
Setting detail: DERMATOLOGY
End: 2025-05-21

## 2025-05-20 VITALS — HEIGHT: 59 IN | WEIGHT: 230 LBS

## 2025-05-20 DIAGNOSIS — L20.89 OTHER ATOPIC DERMATITIS: ICD-10-CM

## 2025-05-20 PROCEDURE — 99213 OFFICE O/P EST LOW 20 MIN: CPT

## 2025-05-20 PROCEDURE — OTHER COUNSELING: OTHER

## 2025-05-20 PROCEDURE — OTHER PRESCRIPTION: OTHER

## 2025-05-20 PROCEDURE — OTHER PRESCRIPTION MEDICATION MANAGEMENT: OTHER

## 2025-05-20 PROCEDURE — OTHER MIPS QUALITY: OTHER

## 2025-05-20 RX ORDER — BETAMETHASONE DIPROPIONATE 0.5 MG/G
OINTMENT TOPICAL
Qty: 45 | Refills: 4 | Status: ERX | COMMUNITY
Start: 2025-05-20

## 2025-05-20 RX ORDER — CLOBETASOL PROPIONATE 0.5 MG/G
0.05% OINTMENT TOPICAL BID
Qty: 60 | Refills: 3 | Status: ERX

## 2025-05-20 ASSESSMENT — LOCATION SIMPLE DESCRIPTION DERM
LOCATION SIMPLE: RIGHT ELBOW
LOCATION SIMPLE: LEFT ELBOW

## 2025-05-20 ASSESSMENT — LOCATION DETAILED DESCRIPTION DERM
LOCATION DETAILED: LEFT ELBOW
LOCATION DETAILED: RIGHT ELBOW

## 2025-05-20 ASSESSMENT — LOCATION ZONE DERM: LOCATION ZONE: ARM

## 2025-06-02 NOTE — PROGRESS NOTES
ABSENT NOTE:    Roger Fallon was absent from group 2/17/2022 . This absence was excused. This patient contacted counselor via email on 2/16/22. Patient is expected to be in group on 02/21/2022.     STEPHANY Ocampo  2/17/2022 , 7:16 AM         There are no Wet Read(s) to document. There is 1 Wet Read(s) to document.

## 2025-09-03 ENCOUNTER — APPOINTMENT (OUTPATIENT)
Dept: URBAN - METROPOLITAN AREA CLINIC 260 | Age: 60
Setting detail: DERMATOLOGY
End: 2025-09-04

## 2025-09-03 VITALS — WEIGHT: 220 LBS | HEIGHT: 59 IN

## 2025-09-03 DIAGNOSIS — L20.89 OTHER ATOPIC DERMATITIS: ICD-10-CM

## 2025-09-03 DIAGNOSIS — L80 VITILIGO: ICD-10-CM

## 2025-09-03 DIAGNOSIS — B07.8 OTHER VIRAL WARTS: ICD-10-CM

## 2025-09-03 PROCEDURE — OTHER MIPS QUALITY: OTHER

## 2025-09-03 PROCEDURE — OTHER COUNSELING: OTHER

## 2025-09-03 PROCEDURE — 17110 DESTRUCT B9 LESION 1-14: CPT

## 2025-09-03 PROCEDURE — OTHER PRESCRIPTION MEDICATION MANAGEMENT: OTHER

## 2025-09-03 PROCEDURE — OTHER PRESCRIPTION: OTHER

## 2025-09-03 PROCEDURE — OTHER ADDITIONAL NOTES: OTHER

## 2025-09-03 PROCEDURE — 99214 OFFICE O/P EST MOD 30 MIN: CPT | Mod: 25

## 2025-09-03 PROCEDURE — OTHER PHOTO-DOCUMENTATION: OTHER

## 2025-09-03 PROCEDURE — OTHER LIQUID NITROGEN: OTHER

## 2025-09-03 RX ORDER — HALOBETASOL PROPIONATE 0.5 MG/G
OINTMENT TOPICAL
Qty: 50 | Refills: 3 | Status: ERX | COMMUNITY
Start: 2025-09-03

## 2025-09-03 ASSESSMENT — LOCATION SIMPLE DESCRIPTION DERM
LOCATION SIMPLE: RIGHT HAND
LOCATION SIMPLE: RIGHT MIDDLE FINGER
LOCATION SIMPLE: LEFT HAND
LOCATION SIMPLE: RIGHT ELBOW

## 2025-09-03 ASSESSMENT — LOCATION DETAILED DESCRIPTION DERM
LOCATION DETAILED: LEFT RADIAL PALM
LOCATION DETAILED: RIGHT DISTAL ULNAR PALMAR MIDDLE FINGER
LOCATION DETAILED: RIGHT ELBOW
LOCATION DETAILED: RIGHT RADIAL DORSAL HAND

## 2025-09-03 ASSESSMENT — LOCATION ZONE DERM
LOCATION ZONE: ARM
LOCATION ZONE: FINGER
LOCATION ZONE: HAND

## 2025-09-03 ASSESSMENT — BSA VITILIGO: % BODY COVERED IN VITILIGO: 1
